# Patient Record
Sex: FEMALE | Race: WHITE | Employment: FULL TIME | URBAN - METROPOLITAN AREA
[De-identification: names, ages, dates, MRNs, and addresses within clinical notes are randomized per-mention and may not be internally consistent; named-entity substitution may affect disease eponyms.]

---

## 2015-10-08 LAB — CREATININE, EXTERNAL: 0.53

## 2017-03-02 ENCOUNTER — HOSPITAL ENCOUNTER (INPATIENT)
Age: 62
LOS: 5 days | Discharge: HOME | DRG: 189 | End: 2017-03-07
Attending: EMERGENCY MEDICINE | Admitting: INTERNAL MEDICINE
Payer: COMMERCIAL

## 2017-03-02 ENCOUNTER — APPOINTMENT (OUTPATIENT)
Dept: CT IMAGING | Age: 62
DRG: 189 | End: 2017-03-02
Attending: INTERNAL MEDICINE
Payer: COMMERCIAL

## 2017-03-02 ENCOUNTER — OFFICE VISIT (OUTPATIENT)
Dept: INTERNAL MEDICINE CLINIC | Age: 62
End: 2017-03-02

## 2017-03-02 VITALS
HEART RATE: 71 BPM | HEIGHT: 66 IN | DIASTOLIC BLOOD PRESSURE: 105 MMHG | BODY MASS INDEX: 44.03 KG/M2 | RESPIRATION RATE: 19 BRPM | TEMPERATURE: 98.3 F | SYSTOLIC BLOOD PRESSURE: 165 MMHG | WEIGHT: 274 LBS | OXYGEN SATURATION: 80 %

## 2017-03-02 DIAGNOSIS — J96.01 ACUTE RESPIRATORY FAILURE WITH HYPOXIA (HCC): Primary | ICD-10-CM

## 2017-03-02 DIAGNOSIS — R06.02 SHORTNESS OF BREATH: Primary | ICD-10-CM

## 2017-03-02 DIAGNOSIS — R09.02 HYPOXIA: ICD-10-CM

## 2017-03-02 PROBLEM — I10 HTN (HYPERTENSION), BENIGN: Status: ACTIVE | Noted: 2017-03-02

## 2017-03-02 PROBLEM — I31.39 PERICARDIAL EFFUSION: Status: ACTIVE | Noted: 2017-03-02

## 2017-03-02 PROBLEM — R77.8 ELEVATED TROPONIN: Status: ACTIVE | Noted: 2017-03-02

## 2017-03-02 LAB
ALBUMIN SERPL BCP-MCNC: 2.9 G/DL (ref 3.5–5)
ALBUMIN/GLOB SERPL: 0.7 {RATIO} (ref 1.1–2.2)
ALP SERPL-CCNC: 97 U/L (ref 45–117)
ALT SERPL-CCNC: 70 U/L (ref 12–78)
ANION GAP BLD CALC-SCNC: 3 MMOL/L (ref 5–15)
AST SERPL W P-5'-P-CCNC: 42 U/L (ref 15–37)
BASOPHILS # BLD AUTO: 0 K/UL (ref 0–0.1)
BASOPHILS # BLD: 0 % (ref 0–1)
BILIRUB SERPL-MCNC: 0.5 MG/DL (ref 0.2–1)
BNP SERPL-MCNC: 2667 PG/ML (ref 0–125)
BUN SERPL-MCNC: 17 MG/DL (ref 6–20)
BUN/CREAT SERPL: 27 (ref 12–20)
CALCIUM SERPL-MCNC: 8.4 MG/DL (ref 8.5–10.1)
CHLORIDE SERPL-SCNC: 99 MMOL/L (ref 97–108)
CO2 SERPL-SCNC: 38 MMOL/L (ref 21–32)
CREAT SERPL-MCNC: 0.64 MG/DL (ref 0.55–1.02)
EOSINOPHIL # BLD: 0.2 K/UL (ref 0–0.4)
EOSINOPHIL NFR BLD: 2 % (ref 0–7)
ERYTHROCYTE [DISTWIDTH] IN BLOOD BY AUTOMATED COUNT: 15.8 % (ref 11.5–14.5)
GLOBULIN SER CALC-MCNC: 4.1 G/DL (ref 2–4)
GLUCOSE SERPL-MCNC: 94 MG/DL (ref 65–100)
HCT VFR BLD AUTO: 48 % (ref 35–47)
HGB BLD-MCNC: 14.6 G/DL (ref 11.5–16)
LYMPHOCYTES # BLD AUTO: 29 % (ref 12–49)
LYMPHOCYTES # BLD: 2.7 K/UL (ref 0.8–3.5)
MCH RBC QN AUTO: 28.6 PG (ref 26–34)
MCHC RBC AUTO-ENTMCNC: 30.4 G/DL (ref 30–36.5)
MCV RBC AUTO: 93.9 FL (ref 80–99)
MONOCYTES # BLD: 0.8 K/UL (ref 0–1)
MONOCYTES NFR BLD AUTO: 9 % (ref 5–13)
NEUTS SEG # BLD: 5.6 K/UL (ref 1.8–8)
NEUTS SEG NFR BLD AUTO: 60 % (ref 32–75)
PLATELET # BLD AUTO: 260 K/UL (ref 150–400)
POTASSIUM SERPL-SCNC: 4.7 MMOL/L (ref 3.5–5.1)
PROT SERPL-MCNC: 7 G/DL (ref 6.4–8.2)
RBC # BLD AUTO: 5.11 M/UL (ref 3.8–5.2)
SODIUM SERPL-SCNC: 140 MMOL/L (ref 136–145)
TROPONIN I SERPL-MCNC: 0.09 NG/ML
WBC # BLD AUTO: 9.3 K/UL (ref 3.6–11)

## 2017-03-02 PROCEDURE — 85025 COMPLETE CBC W/AUTO DIFF WBC: CPT | Performed by: EMERGENCY MEDICINE

## 2017-03-02 PROCEDURE — 74011250636 HC RX REV CODE- 250/636: Performed by: FAMILY MEDICINE

## 2017-03-02 PROCEDURE — 74011636320 HC RX REV CODE- 636/320: Performed by: EMERGENCY MEDICINE

## 2017-03-02 PROCEDURE — 99285 EMERGENCY DEPT VISIT HI MDM: CPT

## 2017-03-02 PROCEDURE — 96365 THER/PROPH/DIAG IV INF INIT: CPT

## 2017-03-02 PROCEDURE — 84484 ASSAY OF TROPONIN QUANT: CPT | Performed by: EMERGENCY MEDICINE

## 2017-03-02 PROCEDURE — 77030013140 HC MSK NEB VYRM -A

## 2017-03-02 PROCEDURE — 74011250636 HC RX REV CODE- 250/636: Performed by: INTERNAL MEDICINE

## 2017-03-02 PROCEDURE — 93005 ELECTROCARDIOGRAM TRACING: CPT

## 2017-03-02 PROCEDURE — 83880 ASSAY OF NATRIURETIC PEPTIDE: CPT | Performed by: EMERGENCY MEDICINE

## 2017-03-02 PROCEDURE — 74011000250 HC RX REV CODE- 250: Performed by: FAMILY MEDICINE

## 2017-03-02 PROCEDURE — 94640 AIRWAY INHALATION TREATMENT: CPT

## 2017-03-02 PROCEDURE — 94761 N-INVAS EAR/PLS OXIMETRY MLT: CPT

## 2017-03-02 PROCEDURE — 74011250637 HC RX REV CODE- 250/637: Performed by: INTERNAL MEDICINE

## 2017-03-02 PROCEDURE — 71275 CT ANGIOGRAPHY CHEST: CPT

## 2017-03-02 PROCEDURE — 80053 COMPREHEN METABOLIC PANEL: CPT | Performed by: EMERGENCY MEDICINE

## 2017-03-02 PROCEDURE — 65660000000 HC RM CCU STEPDOWN

## 2017-03-02 RX ORDER — ENOXAPARIN SODIUM 100 MG/ML
40 INJECTION SUBCUTANEOUS EVERY 12 HOURS
Status: DISCONTINUED | OUTPATIENT
Start: 2017-03-02 | End: 2017-03-07 | Stop reason: HOSPADM

## 2017-03-02 RX ORDER — HYDRALAZINE HYDROCHLORIDE 20 MG/ML
10 INJECTION INTRAMUSCULAR; INTRAVENOUS
Status: DISCONTINUED | OUTPATIENT
Start: 2017-03-02 | End: 2017-03-07 | Stop reason: HOSPADM

## 2017-03-02 RX ORDER — ASCORBIC ACID 250 MG
250 TABLET ORAL DAILY
COMMUNITY
End: 2017-08-04

## 2017-03-02 RX ORDER — SODIUM CHLORIDE 0.9 % (FLUSH) 0.9 %
5-10 SYRINGE (ML) INJECTION EVERY 8 HOURS
Status: DISCONTINUED | OUTPATIENT
Start: 2017-03-02 | End: 2017-03-07 | Stop reason: HOSPADM

## 2017-03-02 RX ORDER — IPRATROPIUM BROMIDE AND ALBUTEROL SULFATE 2.5; .5 MG/3ML; MG/3ML
3 SOLUTION RESPIRATORY (INHALATION)
Status: COMPLETED | OUTPATIENT
Start: 2017-03-02 | End: 2017-03-02

## 2017-03-02 RX ORDER — TRIAMTERENE/HYDROCHLOROTHIAZID 37.5-25 MG
1 TABLET ORAL DAILY
Status: DISCONTINUED | OUTPATIENT
Start: 2017-03-03 | End: 2017-03-07 | Stop reason: HOSPADM

## 2017-03-02 RX ORDER — ERGOCALCIFEROL 1.25 MG/1
50000 CAPSULE ORAL
COMMUNITY

## 2017-03-02 RX ORDER — FUROSEMIDE 10 MG/ML
20 INJECTION INTRAMUSCULAR; INTRAVENOUS
Status: COMPLETED | OUTPATIENT
Start: 2017-03-02 | End: 2017-03-02

## 2017-03-02 RX ORDER — PROCHLORPERAZINE EDISYLATE 5 MG/ML
5 INJECTION INTRAMUSCULAR; INTRAVENOUS
Status: DISCONTINUED | OUTPATIENT
Start: 2017-03-02 | End: 2017-03-07 | Stop reason: HOSPADM

## 2017-03-02 RX ORDER — MAGNESIUM SULFATE HEPTAHYDRATE 40 MG/ML
2 INJECTION, SOLUTION INTRAVENOUS ONCE
Status: COMPLETED | OUTPATIENT
Start: 2017-03-02 | End: 2017-03-02

## 2017-03-02 RX ORDER — ERGOCALCIFEROL 1.25 MG/1
50000 CAPSULE ORAL
Status: DISCONTINUED | OUTPATIENT
Start: 2017-03-02 | End: 2017-03-07 | Stop reason: HOSPADM

## 2017-03-02 RX ORDER — FLUTICASONE PROPIONATE AND SALMETEROL 100; 50 UG/1; UG/1
1 POWDER RESPIRATORY (INHALATION) EVERY 12 HOURS
COMMUNITY
End: 2017-07-14

## 2017-03-02 RX ORDER — LORATADINE 10 MG/1
10 TABLET ORAL DAILY
COMMUNITY
End: 2017-07-14

## 2017-03-02 RX ORDER — LORATADINE 10 MG/1
10 TABLET ORAL DAILY
Status: DISCONTINUED | OUTPATIENT
Start: 2017-03-03 | End: 2017-03-07 | Stop reason: HOSPADM

## 2017-03-02 RX ORDER — ACETAMINOPHEN 325 MG/1
650 TABLET ORAL
Status: DISCONTINUED | OUTPATIENT
Start: 2017-03-02 | End: 2017-03-07 | Stop reason: HOSPADM

## 2017-03-02 RX ORDER — LEVOFLOXACIN 5 MG/ML
750 INJECTION, SOLUTION INTRAVENOUS EVERY 24 HOURS
Status: DISCONTINUED | OUTPATIENT
Start: 2017-03-02 | End: 2017-03-03

## 2017-03-02 RX ORDER — SODIUM CHLORIDE 0.9 % (FLUSH) 0.9 %
5-10 SYRINGE (ML) INJECTION AS NEEDED
Status: DISCONTINUED | OUTPATIENT
Start: 2017-03-02 | End: 2017-03-07 | Stop reason: HOSPADM

## 2017-03-02 RX ORDER — DIPHENHYDRAMINE HYDROCHLORIDE 50 MG/ML
12.5 INJECTION, SOLUTION INTRAMUSCULAR; INTRAVENOUS
Status: DISCONTINUED | OUTPATIENT
Start: 2017-03-02 | End: 2017-03-07 | Stop reason: HOSPADM

## 2017-03-02 RX ORDER — FLUTICASONE PROPIONATE AND SALMETEROL 100; 50 UG/1; UG/1
1 POWDER RESPIRATORY (INHALATION) EVERY 12 HOURS
Status: CANCELLED | OUTPATIENT
Start: 2017-03-02

## 2017-03-02 RX ORDER — IPRATROPIUM BROMIDE AND ALBUTEROL SULFATE 2.5; .5 MG/3ML; MG/3ML
3 SOLUTION RESPIRATORY (INHALATION)
Status: DISCONTINUED | OUTPATIENT
Start: 2017-03-03 | End: 2017-03-07 | Stop reason: HOSPADM

## 2017-03-02 RX ORDER — NALOXONE HYDROCHLORIDE 0.4 MG/ML
0.4 INJECTION, SOLUTION INTRAMUSCULAR; INTRAVENOUS; SUBCUTANEOUS AS NEEDED
Status: DISCONTINUED | OUTPATIENT
Start: 2017-03-02 | End: 2017-03-07 | Stop reason: HOSPADM

## 2017-03-02 RX ADMIN — HYDRALAZINE HYDROCHLORIDE 10 MG: 20 INJECTION INTRAMUSCULAR; INTRAVENOUS at 20:57

## 2017-03-02 RX ADMIN — IOPAMIDOL 85 ML: 755 INJECTION, SOLUTION INTRAVENOUS at 19:57

## 2017-03-02 RX ADMIN — ENOXAPARIN SODIUM 40 MG: 40 INJECTION SUBCUTANEOUS at 20:58

## 2017-03-02 RX ADMIN — IPRATROPIUM BROMIDE AND ALBUTEROL SULFATE 3 ML: .5; 2.5 SOLUTION RESPIRATORY (INHALATION) at 18:59

## 2017-03-02 RX ADMIN — ERGOCALCIFEROL 50000 UNITS: 1.25 CAPSULE ORAL at 22:50

## 2017-03-02 RX ADMIN — FUROSEMIDE 20 MG: 10 INJECTION, SOLUTION INTRAMUSCULAR; INTRAVENOUS at 20:14

## 2017-03-02 RX ADMIN — IPRATROPIUM BROMIDE AND ALBUTEROL SULFATE 3 ML: .5; 2.5 SOLUTION RESPIRATORY (INHALATION) at 18:07

## 2017-03-02 RX ADMIN — MAGNESIUM SULFATE HEPTAHYDRATE 2 G: 40 INJECTION, SOLUTION INTRAVENOUS at 18:08

## 2017-03-02 RX ADMIN — LEVOFLOXACIN 750 MG: 5 INJECTION, SOLUTION INTRAVENOUS at 21:00

## 2017-03-02 RX ADMIN — Medication 10 ML: at 21:03

## 2017-03-02 NOTE — ED TRIAGE NOTES
Patient states she was diagnosed with bronchitis before christmas. Patient had a chest xray done at [facility] January 24 2017. patient went to see pcp today and was told to go to ED. Patient states she was sating in the 76s at the doctors office.

## 2017-03-02 NOTE — MR AVS SNAPSHOT
Visit Information Date & Time Provider Department Dept. Phone Encounter #  
 3/2/2017  2:30 PM Jessi Marcus, [address] [place] [address],[address] [phone] [id number] Follow-up Instructions Return for advised ER follow up. Merissa Serna Upcoming Health Maintenance Date Due Hepatitis C Screening 1955 DTaP/Tdap/Td series (1 - Tdap) 11/10/1976 PAP AKA CERVICAL CYTOLOGY 11/10/1976 BREAST CANCER SCRN MAMMOGRAM 11/10/2005 FOBT Q 1 YEAR AGE 50-75 11/10/2005 ZOSTER VACCINE AGE 60> 11/10/2015 INFLUENZA AGE 9 TO ADULT 8/1/2016 Allergies as of 3/2/2017  Review Complete On: 3/2/2017 By: Jessi Marcus MD  
  
 Severity Noted Reaction Type Reactions Bee Sting [Sting, Bee] High 03/23/2016    Anaphylaxis Codeine High 03/23/2016    Hives, Shortness of Breath  
 bp drop Methotrexate  03/23/2016    Nausea and Vomiting Penicillins  03/23/2016    Hives, Shortness of Breath  
 bp drop Current Immunizations  Never Reviewed No immunizations on file. Not reviewed this visit You Were Diagnosed With   
  
 Codes Comments Shortness of breath    -  Primary ICD-10-CM: R06.02 
ICD-9-CM: 786.05 Hypoxia     ICD-10-CM: R09.02 
ICD-9-CM: 799.02 Vitals BP  
  
  
  
  
  
 (!) 165/105 (BP 1 Location: Left arm, BP Patient Position: Sitting) Vitals History BMI and BSA Data Body Mass Index Body Surface Area  
 44.22 kg/m 2 2.41 m 2 Preferred Pharmacy Pharmacy Name Phone [phone] [phone] Your Updated Medication List  
  
   
This list is accurate as of: 3/2/17  3:35 PM.  Always use your most recent med list.  
  
  
  
  
 ADVAIR DISKUS 100-50 mcg/dose diskus inhaler Generic drug:  fluticasone-salmeterol Take 1 Puff by inhalation every twelve (12) hours. cholecalciferol 1,000 unit tablet Commonly known as:  VITAMIN D3 Take  by mouth daily. CLARITIN 10 mg tablet Generic drug:  loratadine Take 10 mg by mouth. EPINEPHrine 0.3 mg/0.3 mL injection Commonly known as:  EPIPEN  
0.3 mL by IntraMUSCular route once as needed. 2 pens  
  
 ergocalciferol 50,000 unit capsule Commonly known as:  ERGOCALCIFEROL TK 1 C PO ONCE A WEEK. THIS REPLACES DAILY VITAMIN D  
  
 lidocaine HCl 3 % topical cream  
Commonly known as:  XYLOCAINE Apply  to affected area two (2) times a day. ORENCIA 125 mg/mL Syrg Generic drug:  abatacept Inject once weekly  
  
 triamterene-hydroCHLOROthiazide 37.5-25 mg per tablet Commonly known as:  Sirisha Ruse Take 1 Tab by mouth daily. vitamin c-vitamin e Cap Take  by mouth. Follow-up Instructions Return for advised ER follow up. .  
  
  
Patient Instructions ADVISE ER EVALUATION OF SYMPTOMS OF SHORTNESS OF BREATH AND LOW OXYGEN. Introducing [facility] SERVICES! Dear Alice Persaud: Thank you for requesting a [employer] account. Our records indicate that you already have an active [employer] account. You can access your account anytime at [email or web address]. [email or web address]/[email or web address] Did you know that you can access your hospital and ER discharge instructions at any time in [employer]? You can also review all of your test results from your hospital stay or ER visit. Additional Information If you have questions, please visit the Frequently Asked Questions section of the [employer] website at [email or web address] Remember, [employer] is NOT to be used for urgent needs. For medical emergencies, dial 911. Now available from your iPhone and Android! Please provide this summary of care documentation to your next provider. Your primary care clinician is listed as Luis Choi. If you have any questions after today's visit, please call [phone].

## 2017-03-02 NOTE — ED PROVIDER NOTES
HPI     65 yo F w/ hx of RA and HTN who presents from PCP office for decreased oxygen saturation. Patient reports that for the past 2-2.5 weeks she has SOB and wheezing. Positive dry hacking cough. No sputum. Was given Advair which helped some. SOB is worse when laying down. No PND. Saw her PCP today and oxygen saturation was 70s so patient was advised to go to the ER. Does report having bronchitis since [place]. Had CXR done at [facility] which shows no significant airspace consolidation. Non smoker. Past Medical History:   Diagnosis Date    Hypertension     RA (rheumatoid arthritis) ([facility] [address]      Past Surgical History:   Procedure Laterality Date    HX GYN      R vulvectomy       Family History:   Problem Relation Age of Onset    Heart Disease Father     Hypertension Father     Stroke Father     Heart Disease Mother     Hypertension Mother     Hypertension Sister     Hypertension Brother      Social History     Social History    Marital status:      Spouse name: N/A    Number of children: N/A    Years of education: N/A     Occupational History    Not on file. Social History Main Topics    Smoking status: Former Smoker    Smokeless tobacco: Not on file    Alcohol use Yes    Drug use: No    Sexual activity: Yes     Partners: Male     Other Topics Concern    Not on file     Social History Narrative     ALLERGIES: Bee sting [sting, bee]; Codeine; Methotrexate; and Penicillins    Review of Systems   Constitutional: Negative for chills and fever. HENT: Negative for congestion, ear pain, rhinorrhea and sore throat. Eyes: Negative for visual disturbance. Respiratory: Positive for cough, shortness of breath and wheezing. Cardiovascular: Positive for leg swelling (bilateral lower extermity). Negative for chest pain and palpitations. Gastrointestinal: Negative for abdominal pain, blood in stool, constipation, diarrhea, nausea and vomiting.    Genitourinary: Negative for difficulty urinating and dysuria. Musculoskeletal: Negative for arthralgias and myalgias. Skin: Negative for rash. Neurological: Negative for weakness and numbness. Hematological: Does not bruise/bleed easily. positive daytime sleepiness,fals asleep at the table    Vitals:    03/02/17 1738 03/02/17 1743 03/02/17 1745 03/02/17 1800   BP: (!) 206/156  (!) 186/98 (!) 197/100   Pulse: 77  76 80   Resp: 21  28 20   Temp: 98.3 °F (36.8 °C)      SpO2: 97% 98% 96% 96%   Weight: 124.3 kg (274 lb)      Height: 5' 6\" (1.676 m)            Physical Exam   Constitutional: She appears distressed (mild). Neck: Neck supple. No JVD present. Cardiovascular: Normal rate, regular rhythm, normal heart sounds and intact distal pulses. Exam reveals no gallop and no friction rub. No murmur heard. Pulmonary/Chest: She has no rales. Very poor breath sounds, few wheezes in the RUL   Abdominal: Soft. Bowel sounds are normal. She exhibits no distension. There is no tenderness. There is no rebound and no guarding. Musculoskeletal: She exhibits edema (bilateral +1 pitting). Neurological: She is alert. No focal deficits   Vitals reviewed.      MDM  Number of Diagnoses or Management Options  Diagnosis management comments: Hypoxia  Unknown etiology  - duoneb and Mag 2 g  - CMP, CBC, troponin, pro BNP    ED Course     EKG: Personally reviewed and demonstrates: NSR     Lab Results   Component Value Date/Time    WBC 9.3 03/02/2017 05:47 PM    HGB 14.6 03/02/2017 05:47 PM    HCT 48.0 03/02/2017 05:47 PM    PLATELET 943 82/98/6845 05:47 PM    MCV 93.9 03/02/2017 05:47 PM      Lab Results   Component Value Date/Time    Sodium 140 03/02/2017 05:47 PM    Potassium 4.7 03/02/2017 05:47 PM    Chloride 99 03/02/2017 05:47 PM    CO2 38 03/02/2017 05:47 PM    Anion gap 3 03/02/2017 05:47 PM    Glucose 94 03/02/2017 05:47 PM    BUN 17 03/02/2017 05:47 PM    Creatinine 0.64 03/02/2017 05:47 PM    BUN/Creatinine ratio 27 03/02/2017 05:47 PM    GFR est AA >60 03/02/2017 05:47 PM    GFR est non-AA >60 03/02/2017 05:47 PM    Calcium 8.4 03/02/2017 05:47 PM    Bilirubin, total 0.5 03/02/2017 05:47 PM    AST (SGOT) 42 03/02/2017 05:47 PM    Alk. phosphatase 97 03/02/2017 05:47 PM    Protein, total 7.0 03/02/2017 05:47 PM    Albumin 2.9 03/02/2017 05:47 PM    Globulin 4.1 03/02/2017 05:47 PM    A-G Ratio 0.7 03/02/2017 05:47 PM    ALT (SGPT) 70 03/02/2017 05:47 PM      Lab Results   Component Value Date/Time    Troponin-I, Qt. 0.09 03/02/2017 05:47 PM      Pro BNP: 2667    Procedures     Patient evaluated after first duoneb, slight improvement in air movement. Scattered wheezing and rales. Repeat duoneb    Patient evaluated after second duoneb. Some improvement in air movement. Diffuse wheezing and rales. bibasliar wheezing. Continues to be hypoxic 79%. Will try non rebreater    Will give Lasix 20 mg IV    DDx:   Acute hypoxic respiratory failure  New onset CHF  Acute bronchitis    Spoke to Dr Kiko Easley, hospitalist who will admit patient.      Patient discussed with Dr Leni Davenport, ED attending     Marivel Negrete MD  Family Medicine Resident (PGY-3)  3/2/2017

## 2017-03-02 NOTE — PROGRESS NOTES
Zulema Man is a 64 y.o. female who complains of shortness of breath for 2 weeks. She reports a nonproductive cough, wheezing, and SOB at rest and with exertion for the past 2 weeks that has been progressively worsening. She has not had any URI sx. No fever. Legs swelling for the past 1 week. Denies orthopnea or PND. No calf pain. No history of CAD or CHF. Nonsmoker. No asthma hx. She does have a history of RA, but no prior pulmonary sx from RA. Treated for RA by Dr. Donnie Mccarthy, on [facility]. She started Advair 100/50 twice a day 2 weeks ago with no change in her symptoms. She was seen at Dr. Shani Cash office and reports her SpO2 was 88% at that time. She was sent by Dr. Jayro Odom for a CXR on 2/24, no pneumonia or CXR seen. Past Medical History:   Diagnosis Date    Hypertension     RA (rheumatoid arthritis) ([facility] [address]        Family History   Problem Relation Age of Onset    Heart Disease Father     Hypertension Father     Stroke Father     Heart Disease Mother     Hypertension Mother     Hypertension Sister     Hypertension Brother        Social History     Social History    Marital status:      Spouse name: N/A    Number of children: N/A    Years of education: N/A     Occupational History    Not on file. Social History Main Topics    Smoking status: Former Smoker    Smokeless tobacco: Not on file    Alcohol use Yes    Drug use: No    Sexual activity: Yes     Partners: Male     Other Topics Concern    Not on file     Social History Narrative       No current facility-administered medications on file prior to visit. Current Outpatient Prescriptions on File Prior to Visit   Medication Sig Dispense Refill    triamterene-hydroCHLOROthiazide (MAXZIDE) 37.5-25 mg per tablet Take 1 Tab by mouth daily. 90 Tab 3    EPINEPHrine (EPIPEN) 0.3 mg/0.3 mL (1:1,000) injection 0.3 mL by IntraMUSCular route once as needed.  2 pens 0.6 mL 0    ORENCIA 125 mg/mL syrg 125 mg by SubCUTAneous route every seven (7) days. On Saturday      cholecalciferol (VITAMIN D3) 1,000 unit tablet Take 1,000 Units by mouth daily. Except on Friday         Review of Systems  Pertinent items are noted in HPI. Objective:     Visit Vitals    BP (!) 165/105 (BP 1 Location: Left arm, BP Patient Position: Sitting)    Pulse 71    Temp 98.3 °F (36.8 °C) (Oral)    Resp 19    Ht 5' 6\" (1.676 m)    Wt 274 lb (124.3 kg)    LMP 02/23/2013    SpO2 (!) 80%    BMI 44.22 kg/m2     Gen:  SOB at rest, SpO2 77%, on BNC O2 at 2l/min increased to 90% and less SOB  HEENT:   PERRL,normal conjunctiva. OP no erythema, no exudates, MMM  Neck:  No masses, no bruits  Resp:  Diminished breath sounds, no wheezing, no rhonchi, no rales  CV:  RRR, normal S1S2   Extrem:  +2 pulses, +1 edema to knee bilaterally, warm distally      Assessment/Plan:       ICD-10-CM ICD-9-CM    1. Shortness of breath R06.02 786.05    2. Hypoxia R09.02 799.02    I am concerned about possible PE versus CHF. I recommend transport to ED [facility] by EMS. Patient refused and signed AMA form. She states that she wants to go home and then will go to ED on her own. I have advised her that due to her level of hypoxia and significant SOB, she is at high risk of deterioration. She is adamant that she will not go via EMS to ED now. Signed AMA form. Follow-up Disposition:  Return for advised ER follow up.  Torres Ulloa MD

## 2017-03-02 NOTE — IP AVS SNAPSHOT
Elise Hilario 
 
 
 [facility] [address] [facility] 
[phone] Patient: Rei Weinberg MRN: [id number] KET:[birth year] You are allergic to the following Allergen Reactions Bee Sting (Sting, Bee) Anaphylaxis Codeine Hives Shortness of Breath  
 bp drop Methotrexate Nausea and Vomiting Penicillins Hives Shortness of Breath  
 bp drop Recent Documentation Height Weight BMI OB Status Smoking Status 1.676 m 119.9 kg 42.66 kg/m2 Postmenopausal Former Smoker Emergency Contacts Name Discharge Info Relation Home Work [facility] DISCHARGE CAREGIVER [3] Sister [23]   [phone] Raciel Lehman DISCHARGE CAREGIVER [3] Other Relative [6] [phone] About your hospitalization You were admitted on:  March 2, 2017 You last received care in the:  [facility] 3 PRO CARE TELE 2 You were discharged on:  March 7, 2017 Unit phone number:  [phone] Why you were hospitalized Your primary diagnosis was:  Not on File Your diagnoses also included:  Sob (Shortness Of Breath), Htn (Hypertension), Benign, Rheumatoid Arthritis Involving Multiple Sites With Positive Rheumatoid Factor (Hcc), Acute Respiratory Failure With Hypoxia (Hcc), Elevated Troponin, Pericardial Effusion, Acute Encephalopathy, Multifocal Myoclonus Providers Seen During Your Hospitalizations Provider Role Specialty Primary office phone Tania Rainey MD Attending Provider Emergency Medicine [phone] Angie Flores MD Attending Provider Internal Medicine [phone] Guera Arriaga MD Attending Provider Hospitalist [phone] Anthony Saucedo MD Attending Provider Internal Medicine [phone] Your Primary Care Physician (PCP) Primary Care Physician Office Phone Office Fax Marilee Rater [phone] Follow-up Information Follow up With Details Comments Contact Info Carmen Guzman MD On 3/9/2017 11:20 am  [address] [facility] [address] [facility] 
[phone] Hailee Medina MD   [address] [place] [address],[address] [facility] 
[phone] Hanna Gloria MD In 1 week  [address] [facility] [address] [facility] 
[phone] Your Appointments Thursday March 09, 2017 11:20 AM EST HOSPITAL DISCHARGE with Carmen Guzman MD  
[facility] ([facility]) [address] [facility] [address] [facility]  
[phone] Current Discharge Medication List  
  
START taking these medications Dose & Instructions Dispensing Information Comments Morning Noon Evening Bedtime  
 albuterol-ipratropium 2.5 mg-0.5 mg/3 ml Nebu Commonly known as:  Ulises Embs Your next dose is: Today, Tomorrow Other:  _________ Dose:  3 mL  
3 mL by Nebulization route every six (6) hours as needed. Quantity:  30 Nebule Refills:  0  
     
   
   
   
  
 atorvastatin 10 mg tablet Commonly known as:  LIPITOR Your next dose is: Today, Tomorrow Other:  _________ Dose:  10 mg Take 1 Tab by mouth daily. Quantity:  30 Tab Refills:  0  
     
   
   
   
  
 levoFLOXacin 750 mg tablet Commonly known as:  Charlcie Micah Your next dose is: Today, Tomorrow Other:  _________ Dose:  750 mg Take 1 Tab by mouth every twenty-four (24) hours for 2 days. Quantity:  2 Tab Refills:  0  
     
   
   
   
  
 metoprolol succinate 25 mg XL tablet Commonly known as:  TOPROL-XL Your next dose is: Today, Tomorrow Other:  _________ Dose:  25 mg Take 1 Tab by mouth daily. Quantity:  30 Tab Refills:  0 CONTINUE these medications which have NOT CHANGED Dose & Instructions Dispensing Information Comments Morning Noon Evening Bedtime ADVAIR DISKUS 100-50 mcg/dose diskus inhaler Generic drug:  fluticasone-salmeterol Your next dose is: Today, Tomorrow Other:  _________ Dose:  1 Puff Take 1 Puff by inhalation every twelve (12) hours. Refills:  0  
     
   
   
   
  
 cholecalciferol 1,000 unit tablet Commonly known as:  VITAMIN D3 Your next dose is: Today, Tomorrow Other:  _________ Dose:  1000 Units Take 1,000 Units by mouth daily. Except on Friday Refills:  0 CLARITIN 10 mg tablet Generic drug:  loratadine Your next dose is: Today, Tomorrow Other:  _________ Dose:  10 mg Take 10 mg by mouth daily. Refills:  0 EPINEPHrine 0.3 mg/0.3 mL injection Commonly known as:  Ivania Matt Your next dose is: Today, Tomorrow Other:  _________ Dose:  0.3 mg  
0.3 mL by IntraMUSCular route once as needed. 2 pens Quantity:  0.6 mL Refills:  0 Ocuvite PreserVision [phone] unit-mg-unit Tab tablet Generic drug:  vitamins  A,C,E-zinc-copper Your next dose is: Today, Tomorrow Other:  _________ Dose:  1 Tab Take 1 Tab by mouth two (2) times a day. Refills:  0  
     
   
   
   
  
 ORENCIA 125 mg/mL Syrg Generic drug:  abatacept Your next dose is: Today, Tomorrow Other:  _________ Dose:  125 mg  
125 mg by SubCUTAneous route every seven (7) days. On Saturday Refills:  0  
     
   
   
   
  
 triamterene-hydroCHLOROthiazide 37.5-25 mg per tablet Commonly known as:  Adriánciro Rogers Your next dose is: Today, Tomorrow Other:  _________ Dose:  1 Tab Take 1 Tab by mouth daily. Quantity:  90 Tab Refills:  3 VITAMIN C 250 mg tablet Generic drug:  ascorbic acid (vitamin C) Your next dose is: Today, Tomorrow Other:  _________ Dose:  250 mg Take 250 mg by mouth daily. Refills:  0  
     
   
   
   
  
 VITAMIN D2 50,000 unit capsule Generic drug:  ergocalciferol Your next dose is: Today, Tomorrow Other:  _________ Dose:  02340 Units Take 50,000 Units by mouth every seven (7) days. On Friday   Indications: VITAMIN D DEFICIENCY Refills:  0 Where to Get Your Medications Information on where to get these meds will be given to you by the nurse or doctor. ! Ask your nurse or doctor about these medications  
  albuterol-ipratropium 2.5 mg-0.5 mg/3 ml Nebu  
 atorvastatin 10 mg tablet  
 levoFLOXacin 750 mg tablet  
 metoprolol succinate 25 mg XL tablet Discharge Instructions HOSPITALIST DISCHARGE INSTRUCTIONS 
NAME: Daniela Jett :  1955 MRN:  [id number] Date/Time:  3/7/2017 9:22 AM 
 
ADMIT DATE: 3/2/2017 DISCHARGE DATE: 3/7/2017 ADMITTING DIAGNOSIS: 
Shortness of breath Hypoxia (low oxygen levels) DISCHARGE DIAGNOSIS: 
As above MEDICATIONS: 
 
· It is important that you take the medication exactly as they are prescribed. · Keep your medication in the bottles provided by the pharmacist and keep a list of the medication names, dosages, and times to be taken in your wallet. · Do not take other medications without consulting your doctor. Pain Management: per above medications What to do at Home: 1. Please be sure to finish the course of antibiotics for your lung infection 2. Be sure to follow up with pulmonary in clinic for further testing regarding your lung disease Recommended diet:  Resume previous diet Recommended activity: Activity as tolerated If you experience any of the following symptoms then please call your primary care physician or return to the emergency room if you cannot get hold of your doctor: 
Fever, chills, nausea, vomiting, diarrhea, change in mentation, falling, bleeding, shortness of breath Follow Up: 
Dr. Lizeth Pérez MD  you are to call and set up an appointment to see them in 1 week. Information obtained by : 
I understand that if any problems occur once I am at home I am to contact my physician. I understand and acknowledge receipt of the instructions indicated above. Physician's or R.N.'s Signature                                                                  Date/Time Patient or Representative Signature                                                          Date/Time Discharge Orders None [id number] [employer] Announcement We are excited to announce that we are making your provider's discharge notes available to you in [employer]. You will see these notes when they are completed and signed by the physician that discharged you from your recent hospital stay. If you have any questions or concerns about any information you see in [employer], please call the Health Information Department where you were seen or reach out to your Primary Care Provider for more information about your plan of care. Introducing [facility] & HEALTH SERVICES! Dear Zac Mccloud: Thank you for requesting a [employer] account. Our records indicate that you already have an active [employer] account. You can access your account anytime at [email or web address] Did you know that you can access your hospital and ER discharge instructions at any time in [employer]? You can also review all of your test results from your hospital stay or ER visit. Additional Information If you have questions, please visit the Frequently Asked Questions section of the [facility] website at [email or web address] Remember, MyChart is NOT to be used for urgent needs. For medical emergencies, dial 911. Now available from your iPhone and Android! General Information Please provide this summary of care documentation to your next provider. Patient Signature:  ____________________________________________________________ Date:  ____________________________________________________________  
  
Dawood Elliott Provider Signature:  ____________________________________________________________ Date:  ____________________________________________________________

## 2017-03-02 NOTE — IP AVS SNAPSHOT
Current Discharge Medication List  
  
Take these medications at their scheduled times Dose & Instructions Dispensing Information Comments Morning Noon Evening Bedtime ADVAIR DISKUS 100-50 mcg/dose diskus inhaler Generic drug:  fluticasone-salmeterol Your next dose is: Today, Tomorrow Other:  ____________ Dose:  1 Puff Take 1 Puff by inhalation every twelve (12) hours. Refills:  0  
     
   
   
   
  
 atorvastatin 10 mg tablet Commonly known as:  LIPITOR Your next dose is: Today, Tomorrow Other:  ____________ Dose:  10 mg Take 1 Tab by mouth daily. Quantity:  30 Tab Refills:  0  
     
   
   
   
  
 cholecalciferol 1,000 unit tablet Commonly known as:  VITAMIN D3 Your next dose is: Today, Tomorrow Other:  ____________ Dose:  1000 Units Take 1,000 Units by mouth daily. Except on Friday Refills:  0 CLARITIN 10 mg tablet Generic drug:  loratadine Your next dose is: Today, Tomorrow Other:  ____________ Dose:  10 mg Take 10 mg by mouth daily. Refills:  0  
     
   
   
   
  
 levoFLOXacin 750 mg tablet Commonly known as:  Tahira Armor Your next dose is: Today, Tomorrow Other:  ____________ Dose:  750 mg Take 1 Tab by mouth every twenty-four (24) hours for 2 days. Quantity:  2 Tab Refills:  0  
     
   
   
   
  
 metoprolol succinate 25 mg XL tablet Commonly known as:  TOPROL-XL Your next dose is: Today, Tomorrow Other:  ____________ Dose:  25 mg Take 1 Tab by mouth daily. Quantity:  30 Tab Refills:  0 Ocuvite PreserVision [phone] unit-mg-unit Tab tablet Generic drug:  vitamins  A,C,E-zinc-copper Your next dose is: Today, Tomorrow Other:  ____________ Dose:  1 Tab Take 1 Tab by mouth two (2) times a day. Refills:  0 ORENCIA 125 mg/mL Syrg Generic drug:  abatacept Your next dose is: Today, Tomorrow Other:  ____________ Dose:  125 mg  
125 mg by SubCUTAneous route every seven (7) days. On Saturday Refills:  0  
     
   
   
   
  
 triamterene-hydroCHLOROthiazide 37.5-25 mg per tablet Commonly known as:  Freddy Mathieu Your next dose is: Today, Tomorrow Other:  ____________ Dose:  1 Tab Take 1 Tab by mouth daily. Quantity:  90 Tab Refills:  3 VITAMIN C 250 mg tablet Generic drug:  ascorbic acid (vitamin C) Your next dose is: Today, Tomorrow Other:  ____________ Dose:  250 mg Take 250 mg by mouth daily. Refills:  0  
     
   
   
   
  
 VITAMIN D2 50,000 unit capsule Generic drug:  ergocalciferol Your next dose is: Today, Tomorrow Other:  ____________ Dose:  44090 Units Take 50,000 Units by mouth every seven (7) days. On Friday   Indications: VITAMIN D DEFICIENCY Refills:  0 Take these medications as needed Dose & Instructions Dispensing Information Comments Morning Noon Evening Bedtime  
 albuterol-ipratropium 2.5 mg-0.5 mg/3 ml Nebu Commonly known as:  Vernon Shirlene Your next dose is: Today, Tomorrow Other:  ____________ Dose:  3 mL  
3 mL by Nebulization route every six (6) hours as needed. Quantity:  30 Nebule Refills:  0 EPINEPHrine 0.3 mg/0.3 mL injection Commonly known as:  Mone Coke Your next dose is: Today, Tomorrow Other:  ____________ Dose:  0.3 mg  
0.3 mL by IntraMUSCular route once as needed. 2 pens Quantity:  0.6 mL Refills:  0 Where to Get Your Medications Information about where to get these medications is not yet available ! Ask your nurse or doctor about these medications albuterol-ipratropium 2.5 mg-0.5 mg/3 ml Nebu  
 atorvastatin 10 mg tablet  
 levoFLOXacin 750 mg tablet  
 metoprolol succinate 25 mg XL tablet

## 2017-03-02 NOTE — PROGRESS NOTES
Reviewed record in preparation for visit and have obtained necessary documentation. Identified pt with two pt identifiers(name and ).     Chief Complaint   Patient presents with    Cough     c/o of dry cough since december     Shortness of Breath     c/o of SOB x 2 weeks            Coordination of Care Questionnaire:  :     1) Have you been to an emergency room, urgent care clinic since your last visit? no   Hospitalized since your last visit? no             2) Have you seen or consulted any other health care providers outside of [address] [place] [address] since your last visit? no  (Include any pap smears or colon screenings in this section.)

## 2017-03-03 ENCOUNTER — APPOINTMENT (OUTPATIENT)
Dept: CT IMAGING | Age: 62
DRG: 189 | End: 2017-03-03
Payer: COMMERCIAL

## 2017-03-03 LAB
ALBUMIN SERPL BCP-MCNC: 2.8 G/DL (ref 3.5–5)
ALBUMIN/GLOB SERPL: 0.7 {RATIO} (ref 1.1–2.2)
ALP SERPL-CCNC: 98 U/L (ref 45–117)
ALT SERPL-CCNC: 63 U/L (ref 12–78)
ANION GAP BLD CALC-SCNC: 3 MMOL/L (ref 5–15)
AST SERPL W P-5'-P-CCNC: 30 U/L (ref 15–37)
ATRIAL RATE: 77 BPM
BASOPHILS # BLD AUTO: 0 K/UL (ref 0–0.1)
BASOPHILS # BLD: 0 % (ref 0–1)
BILIRUB SERPL-MCNC: 0.3 MG/DL (ref 0.2–1)
BUN SERPL-MCNC: 16 MG/DL (ref 6–20)
BUN/CREAT SERPL: 22 (ref 12–20)
CALCIUM SERPL-MCNC: 8.3 MG/DL (ref 8.5–10.1)
CALCULATED P AXIS, ECG09: 53 DEGREES
CALCULATED R AXIS, ECG10: -3 DEGREES
CALCULATED T AXIS, ECG11: 17 DEGREES
CHLORIDE SERPL-SCNC: 100 MMOL/L (ref 97–108)
CO2 SERPL-SCNC: 38 MMOL/L (ref 21–32)
CREAT SERPL-MCNC: 0.72 MG/DL (ref 0.55–1.02)
DIAGNOSIS, 93000: NORMAL
EOSINOPHIL # BLD: 0.1 K/UL (ref 0–0.4)
EOSINOPHIL NFR BLD: 2 % (ref 0–7)
ERYTHROCYTE [DISTWIDTH] IN BLOOD BY AUTOMATED COUNT: 15.6 % (ref 11.5–14.5)
GLOBULIN SER CALC-MCNC: 4.1 G/DL (ref 2–4)
GLUCOSE SERPL-MCNC: 100 MG/DL (ref 65–100)
HCT VFR BLD AUTO: 51.2 % (ref 35–47)
HGB BLD-MCNC: 15.4 G/DL (ref 11.5–16)
LYMPHOCYTES # BLD AUTO: 22 % (ref 12–49)
LYMPHOCYTES # BLD: 1.8 K/UL (ref 0.8–3.5)
MCH RBC QN AUTO: 28.6 PG (ref 26–34)
MCHC RBC AUTO-ENTMCNC: 30.1 G/DL (ref 30–36.5)
MCV RBC AUTO: 95 FL (ref 80–99)
MONOCYTES # BLD: 0.7 K/UL (ref 0–1)
MONOCYTES NFR BLD AUTO: 9 % (ref 5–13)
NEUTS SEG # BLD: 5.5 K/UL (ref 1.8–8)
NEUTS SEG NFR BLD AUTO: 67 % (ref 32–75)
P-R INTERVAL, ECG05: 168 MS
PLATELET # BLD AUTO: 244 K/UL (ref 150–400)
POTASSIUM SERPL-SCNC: 4.1 MMOL/L (ref 3.5–5.1)
PROT SERPL-MCNC: 6.9 G/DL (ref 6.4–8.2)
Q-T INTERVAL, ECG07: 386 MS
QRS DURATION, ECG06: 86 MS
QTC CALCULATION (BEZET), ECG08: 436 MS
RBC # BLD AUTO: 5.39 M/UL (ref 3.8–5.2)
SODIUM SERPL-SCNC: 141 MMOL/L (ref 136–145)
TROPONIN I SERPL-MCNC: 0.05 NG/ML
TROPONIN I SERPL-MCNC: 0.07 NG/ML
TSH SERPL DL<=0.05 MIU/L-ACNC: 0.89 UIU/ML (ref 0.36–3.74)
VENTRICULAR RATE, ECG03: 77 BPM
WBC # BLD AUTO: 8.2 K/UL (ref 3.6–11)

## 2017-03-03 PROCEDURE — 74011250636 HC RX REV CODE- 250/636: Performed by: INTERNAL MEDICINE

## 2017-03-03 PROCEDURE — 84484 ASSAY OF TROPONIN QUANT: CPT | Performed by: INTERNAL MEDICINE

## 2017-03-03 PROCEDURE — 74011250637 HC RX REV CODE- 250/637: Performed by: INTERNAL MEDICINE

## 2017-03-03 PROCEDURE — 36415 COLL VENOUS BLD VENIPUNCTURE: CPT | Performed by: INTERNAL MEDICINE

## 2017-03-03 PROCEDURE — 77010033678 HC OXYGEN DAILY

## 2017-03-03 PROCEDURE — 85025 COMPLETE CBC W/AUTO DIFF WBC: CPT | Performed by: INTERNAL MEDICINE

## 2017-03-03 PROCEDURE — 94640 AIRWAY INHALATION TREATMENT: CPT

## 2017-03-03 PROCEDURE — 77030027138 HC INCENT SPIROMETER -A

## 2017-03-03 PROCEDURE — 71250 CT THORAX DX C-: CPT

## 2017-03-03 PROCEDURE — 65660000000 HC RM CCU STEPDOWN

## 2017-03-03 PROCEDURE — 93306 TTE W/DOPPLER COMPLETE: CPT

## 2017-03-03 PROCEDURE — 84443 ASSAY THYROID STIM HORMONE: CPT | Performed by: PHYSICIAN ASSISTANT

## 2017-03-03 PROCEDURE — 74011000250 HC RX REV CODE- 250: Performed by: INTERNAL MEDICINE

## 2017-03-03 PROCEDURE — 80053 COMPREHEN METABOLIC PANEL: CPT | Performed by: INTERNAL MEDICINE

## 2017-03-03 RX ORDER — LEVOFLOXACIN 750 MG/1
750 TABLET ORAL EVERY 24 HOURS
Status: DISCONTINUED | OUTPATIENT
Start: 2017-03-03 | End: 2017-03-07 | Stop reason: HOSPADM

## 2017-03-03 RX ORDER — GUAIFENESIN 100 MG/5ML
81 LIQUID (ML) ORAL
Status: COMPLETED | OUTPATIENT
Start: 2017-03-03 | End: 2017-03-03

## 2017-03-03 RX ORDER — METOPROLOL SUCCINATE 25 MG/1
25 TABLET, EXTENDED RELEASE ORAL DAILY
Status: DISCONTINUED | OUTPATIENT
Start: 2017-03-03 | End: 2017-03-07 | Stop reason: HOSPADM

## 2017-03-03 RX ORDER — ATORVASTATIN CALCIUM 10 MG/1
10 TABLET, FILM COATED ORAL DAILY
Status: DISCONTINUED | OUTPATIENT
Start: 2017-03-03 | End: 2017-03-07 | Stop reason: HOSPADM

## 2017-03-03 RX ORDER — FUROSEMIDE 10 MG/ML
20 INJECTION INTRAMUSCULAR; INTRAVENOUS ONCE
Status: COMPLETED | OUTPATIENT
Start: 2017-03-03 | End: 2017-03-03

## 2017-03-03 RX ADMIN — ENOXAPARIN SODIUM 40 MG: 40 INJECTION SUBCUTANEOUS at 09:54

## 2017-03-03 RX ADMIN — IPRATROPIUM BROMIDE AND ALBUTEROL SULFATE 3 ML: .5; 2.5 SOLUTION RESPIRATORY (INHALATION) at 01:01

## 2017-03-03 RX ADMIN — Medication 10 ML: at 13:27

## 2017-03-03 RX ADMIN — FUROSEMIDE 20 MG: 10 INJECTION, SOLUTION INTRAMUSCULAR; INTRAVENOUS at 14:58

## 2017-03-03 RX ADMIN — ATORVASTATIN CALCIUM 10 MG: 10 TABLET, FILM COATED ORAL at 13:26

## 2017-03-03 RX ADMIN — Medication 10 ML: at 05:07

## 2017-03-03 RX ADMIN — LEVOFLOXACIN 750 MG: 750 TABLET, FILM COATED ORAL at 22:24

## 2017-03-03 RX ADMIN — ENOXAPARIN SODIUM 40 MG: 40 INJECTION SUBCUTANEOUS at 22:24

## 2017-03-03 RX ADMIN — TRIAMTERENE AND HYDROCHLOROTHIAZIDE 1 TABLET: 37.5; 25 TABLET ORAL at 09:54

## 2017-03-03 RX ADMIN — IPRATROPIUM BROMIDE AND ALBUTEROL SULFATE 3 ML: .5; 2.5 SOLUTION RESPIRATORY (INHALATION) at 20:20

## 2017-03-03 RX ADMIN — ASPIRIN 81 MG CHEWABLE TABLET 81 MG: 81 TABLET CHEWABLE at 13:26

## 2017-03-03 RX ADMIN — METOPROLOL SUCCINATE 25 MG: 25 TABLET, FILM COATED, EXTENDED RELEASE ORAL at 13:26

## 2017-03-03 RX ADMIN — Medication 10 ML: at 22:25

## 2017-03-03 RX ADMIN — LORATADINE 10 MG: 10 TABLET ORAL at 09:54

## 2017-03-03 NOTE — CDMP QUERY
1.    The [address] [place] [address] has issued a statement indicating that, \"Individuals who are overweight, obese, or morbidly obese are at an increased risk for certain medical conditions when compared to persons of normal weight. Therefore, these conditions are always clinically significant and reportable when documented by the provider. \"    Review of the documentation for this patient demonstrates the clinical indicators of a BMI of 43.4 (height of 5 ft. 6 in. with weight of 121.8 kg ). Please clarify if the patient has a diagnosis associated with those findings:  _____  Obesity (BMI of 30-39. 9)  _____  Morbid Obesity  (BMI 40 or greater)  ______Overweight (BMI 25-29. 9)  _____  Other weight status (specify  status)  _____  Unable to determine      Thanks for your time.     Osman Talley RN, BSN  [phone]

## 2017-03-03 NOTE — PROGRESS NOTES
[facility] Dosing Services: 3/2/17  Pharmacy note for Dr Louise Cerna regarding Lovenox dose adjustment for increased BMI:    Wt Readings from Last 1 Encounters:   03/02/17 124.3 kg (274 lb)       Ht Readings from Last 1 Encounters:   03/02/17 167.6 cm (66\")           Previous Dose 40mg sq q24h   Creatinine Clearance Estimated Creatinine Clearance: 124.3 mL/min (based on Cr of 0.64). Creatinine Lab Results   Component Value Date/Time    Creatinine 0.64 03/02/2017 05:47 PM       Platelet Lab Results   Component Value Date/Time    PLATELET 118 78/17/2663 05:47 PM      H/H Lab Results   Component Value Date/Time    HGB 14.6 03/02/2017 05:47 PM           Pharmacist made change to enoxaparin therapy based on:    [ x ] BMI: dose changed to:  40mg sq q12h    - Pharmacy to automatically make dose adjustment for obesity (BMI greater than 40)  - Pharmacy to make dose rounding adjustments per [facility] dose adjustment scale.       Leann Kang PHARMD . Contact information: [phone]

## 2017-03-03 NOTE — ROUTINE PROCESS
TRANSFER - IN REPORT:    Verbal report received from Heather (name) on Jose Overton  being received from [facility](unit) for routine progression of care      Report consisted of patients Situation, Background, Assessment and   Recommendations(SBAR). Information from the following report(s) SBAR, ED Summary, [facility] and Recent Results was reviewed with the receiving nurse. Opportunity for questions and clarification was provided. Assessment completed upon patients arrival to unit and care assumed.

## 2017-03-03 NOTE — CONSULTS
Name: [address]: [facility]   : 1955 Admit Date: 3/2/2017   Phone: [phone]  Room: [facility]/   PCP: Allie Cloud MD  MRN: [id number]   Date: 3/3/2017  Code: Full Code        HPI:    Chart and notes reviewed. Data reviewed. I review the patient's current medications in the medical record at each encounter. I have evaluated and examined the patient. 9:24 AM       History was obtained from patient. I was asked by Carmen Higgins MD to see Eleazar Dodd in consultation for a chief complaint of progressive dyspnea. Ms. Arturo Alfaro is a pleasant 64year old female who presented to the [address] [place] [address] ED with 2 weeks of worsening SOB. Sats noted to be 88% on RA in her PCP's office. BP uncontrolled at presentation to the ED. States she was diagnosed with bronchitis last December and treated with Eri Mc and Advair. States her cough never completely improved. Reports cough to be dry and denies chest congestion. Denies fever or chills. States SOB does not seem to be affected by rest, activity, or lying flat. Denies known sick contacts. States one of her coworkers Jimmie Mention in the office. Patient with history of RA for which she is on Orencia at baseline. Denies CP. Reports ~ 20lb weight gain in the last couple weeks with associated LE/pedal edema. Received one 20mg IV dose of Lasix in the ED. Denies history of lung or heart disease. Reports HTN. She does not use home inhalers, nebs, or home O2 at baseline. States she did use some leftover Advair which provided some symptom improvement. She is a former smoker. Denies known history of sleep apnea. She is unaware of snoring and denies daytime somnolence. Chest CTA is personally visualized. There is no evidnece of PE. There is atelectasis/consolidation in the posterior lower lobes bilaterally. The upper lung zones are clear. There is cardiomegaly and pericardial effusion.     WBC 8.2  Hgb 15.4  Trop 0.05 (down from 0.09)  proBNP 2667    Past Medical History:   Diagnosis Date    Hypertension     RA (rheumatoid arthritis) ([address]        Past Surgical History:   Procedure Laterality Date    HX GYN      R vulvectomy       Family History   Problem Relation Age of Onset    Heart Disease Father     Hypertension Father     Stroke Father     Heart Disease Mother     Hypertension Mother     Hypertension Sister     Hypertension Brother        Social History   Substance Use Topics    Smoking status: Former Smoker    Smokeless tobacco: Not on file    Alcohol use Yes       Allergies   Allergen Reactions    Bee Sting [Sting, Bee] Anaphylaxis    Codeine Hives and Shortness of Breath     bp drop     Methotrexate Nausea and Vomiting    Penicillins Hives and Shortness of Breath     bp drop       Current Facility-Administered Medications   Medication Dose Route Frequency    sodium chloride (NS) flush 5-10 mL  5-10 mL IntraVENous Q8H    sodium chloride (NS) flush 5-10 mL  5-10 mL IntraVENous PRN    acetaminophen (TYLENOL) tablet 650 mg  650 mg Oral Q4H PRN    naloxone (NARCAN) injection 0.4 mg  0.4 mg IntraVENous PRN    diphenhydrAMINE (BENADRYL) injection 12.5 mg  12.5 mg IntraVENous Q4H PRN    prochlorperazine (COMPAZINE) injection 5 mg  5 mg IntraVENous Q8H PRN    enoxaparin (LOVENOX) injection 40 mg  40 mg SubCUTAneous Q12H    levoFLOXacin (LEVAQUIN) 750 mg in D5W IVPB  750 mg IntraVENous Q24H    albuterol-ipratropium (DUO-NEB) 2.5 MG-0.5 MG/3 ML  3 mL Nebulization Q6H RT    hydrALAZINE (APRESOLINE) 20 mg/mL injection 10 mg  10 mg IntraVENous Q6H PRN    ergocalciferol (ERGOCALCIFEROL) capsule 50,000 Units  50,000 Units Oral Q7D    loratadine (CLARITIN) tablet 10 mg  10 mg Oral DAILY    triamterene-hydroCHLOROthiazide (MAXZIDE) 37.5-25 mg per tablet 1 Tab  1 Tab Oral DAILY         REVIEW OF SYSTEMS   Negative except as stated in the HPI.       Physical Exam:   Visit Vitals    /76 (BP 1 Location: Left arm, BP Patient Position: At rest)    Pulse 83    Temp 97.5 °F (36.4 °C)    Resp 24    Ht 5' 6\" (1.676 m)    Wt 121.8 kg (268 lb 9.6 oz)    SpO2 90%    BMI 43.35 kg/m2   on 3L    General:  Alert, cooperative, no distress, appears stated age. Head:  Normocephalic, without obvious abnormality, atraumatic. Eyes:  Conjunctivae/corneas clear. Nose: Nares normal. Septum midline. Mucosa normal.    Throat: Lips, mucosa, and tongue normal.  Class 4 airway. Neck: Supple, symmetrical, trachea midline, no adenopathy. Lungs:   Trace crackles in the left base, otherwise clear to auscultation bilaterally. No wheeze or rhonchi. No increased WOB or accessory muscle use. Chest wall:  No tenderness or deformity. Heart:  Regular rate and rhythm, S1, S2 normal, no murmur, click, rub or gallop. Abdomen:   Obese, soft, non-tender. Bowel sounds normal. No masses,  No organomegaly. Extremities: Extremities normal, atraumatic, no cyanosis, + peropheral edema. Pulses: 2+ and symmetric all extremities. Skin: Skin color, texture, turgor normal. No rashes or lesions   Lymph nodes: Cervical, supraclavicular nodes normal.   Neurologic: Grossly nonfocal       Lab Results   Component Value Date/Time    Sodium 141 03/03/2017 04:40 AM    Potassium 4.1 03/03/2017 04:40 AM    Chloride 100 03/03/2017 04:40 AM    CO2 38 03/03/2017 04:40 AM    BUN 16 03/03/2017 04:40 AM    Creatinine 0.72 03/03/2017 04:40 AM    Glucose 100 03/03/2017 04:40 AM    Calcium 8.3 03/03/2017 04:40 AM       Lab Results   Component Value Date/Time    WBC 8.2 03/03/2017 04:40 AM    HGB 15.4 03/03/2017 04:40 AM    PLATELET 509 73/18/9653 04:40 AM    MCV 95.0 03/03/2017 04:40 AM       Lab Results   Component Value Date/Time    AST (SGOT) 30 03/03/2017 04:40 AM    Alk.  phosphatase 98 03/03/2017 04:40 AM    Protein, total 6.9 03/03/2017 04:40 AM    Albumin 2.8 03/03/2017 04:40 AM    Globulin 4.1 03/03/2017 04:40 AM       No results found for: IRON, FE, TIBC, IBCT, PSAT, FERR    No results found for: SR, CRP, STANLEY, ANAIGG, RA, RPR, RPRT, VDRLT, VDRLS, TSH, TSHEXT     No results found for: PH, PHI, PCO2, PCO2I, PO2, PO2I, HCO3, HCO3I, FIO2, FIO2I    Lab Results   Component Value Date/Time    Troponin-I, Qt. 0.05 03/03/2017 12:48 AM        No results found for: CULT    No results found for: TOXA1, RPR, HBCM, HBSAG, HAAB, HCAB, HCAB1, HAAT, G6PD, CRYAC, HIVGT, HIVR, HIV1, HIV12, HIVPC, HIVRPI    No results found for: VANCT, CPK    No results found for: COLOR, APPRN, SPGRU, TABATHA, PROTU, GLUCU, KETU, BILU, BLDU, UROU, ASHKAN, LEUKU, WBCU, RBCU, UEPI, BACTU, CASTS, UCRY    IMPRESSION  · Acute hypoxic respiratory failure  · Dyspnea  · Pericardial effusion  · HTN  · RA  · Former smoker    PLAN  · Wean O2 as able to keep sats > 90%  · Check overnight oximetry  · Duonebs  · Check noncontrast high resolution chest CT to evaluated for potential ILD  · Check viral panel  · Check TSH  · Cardiology consult pending  · F/U ECHO  · BP control per primary team  · DVT prophylaxis: Lovenox      Thank you for allowing us to participate in the care of this patient. We will be happy to follow along in her progress with you.     Kavon Smith, [address]

## 2017-03-03 NOTE — PROGRESS NOTES
9:04 AM  VM left with Cosme Schumacher, NP re pt statement of \"I will not be getting a procedure done here today without a second opinion. I want to eat breakfast. I do not want to see a NP. \" Awaiting return call. 7:45 PM  Bedside shift change report given to Harjeet Andrea (oncoming nurse) by [facility] (offgoing nurse). Report included the following information SBAR, Procedure Summary, Intake/Output, MAR and Recent Results.

## 2017-03-03 NOTE — PROGRESS NOTES
3-3-2017 CASE MANAGEMENT NOTE:  I met with the pt to determine potential discharge needs. The pt lives with her , Oksana Flannery ([phone], [phone]), in a 2-story house. She is independent with her ADL's, works full-time, has no DME and drives. Her PCP is Dr. Garth Zuñiga. She has prescription drug coverage and gets her medications from [facility]. She does not anticipate any discharge needs. Care Management Interventions  PCP Verified by CM:  Yes (Dr. Garth Zuñiga)  Discharge Durable Medical Equipment: No  Physical Therapy Consult: No  Occupational Therapy Consult: No  Speech Therapy Consult: No  Current Support Network: Lives with Spouse, Own Home  Confirm Follow Up Transport: Family  Plan discussed with Pt/Family/Caregiver: Yes  Discharge Location  Discharge Placement:  (Home)    MICHAEL Burnham, CM

## 2017-03-03 NOTE — PROGRESS NOTES
TRANSFER REPORT   TRANSFER - IN REPORT:    Verbal report received from Kita Garcia RN(name) on [facility]  being received from ER (unit) for routine progression of care      Report consisted of patients Situation, Background, Assessment and   Recommendations(SBAR). Information from the following report(s) SBAR, Kardex and MAR was reviewed with the receiving nurse. Opportunity for questions and clarification was provided. Assessment completed upon patients arrival to unit and care assumed. SHIFT ASSESSMENT    2335 Pt received on the unit not in distress. Stable VS.    0021 Primary Nurse Alisson Chaney RN and Luz Buenrostro RN performed a dual skin assessment on this patient Impairment noted- see wound doc flow sheet (small, minor bruises from IV) Augustus score is 22    0659 Pt refuses flu vaccine. END SHIFT REPORT    Bedside and Verbal shift change report given to [employer] (oncoming nurse) by Jonel Bhatti RN (offgoing nurse). Report included the following information SBAR, Kardex, MAR and Cardiac Rhythm sinus rhythm.

## 2017-03-03 NOTE — PROGRESS NOTES
[facility]  [address]  [phone]      Medical Progress Note      NAME: Lisa Almanza   :  1955  MRM:  [id number]    Date/Time: 3/3/2017  7:39 AM       Assessment and Plan:   1. Acute respiratory failure with hypoxia ([facility] (3/2/2017): unclear etiology. Continue supplemental oxygen to keep SAO2 > 90%. . Consult Pulmonology     2. SOB (shortness of breath) (3/2/2017)/Pericardial effusion (3/2/2017)/ Elevated troponin (3/2/2017): ?pneumonia, ? CHF. Obtain an Echocardiogram. Serial cardiac enzymes. Empiric IV levofloxacin. Consult cardiology.      3. Rheumatoid arthritis involving multiple sites with positive rheumatoid factor ([facility] (10/7/2016): Holding Orencia     4. HTN (hypertension), benign (3/2/2017): BP better controlled. On Maxide    5. Morbid obesity. Would benefit from wt loss. Subjective:     Chief Complaint:  SOB is better     ROS:  (bold if positive, if negative)    SOB/FOX   Tolerating PT  Tolerating Diet        Objective:     Last 24hrs VS reviewed since prior progress note.  Most recent are:    Visit Vitals    /76 (BP 1 Location: Left arm, BP Patient Position: At rest)    Pulse 83    Temp 97.5 °F (36.4 °C)    Resp 24    Ht 5' 6\" (1.676 m)    Wt 121.8 kg (268 lb 9.6 oz)    SpO2 90%    BMI 43.35 kg/m2     SpO2 Readings from Last 6 Encounters:   17 90%   17 (!) 80%   10/28/16 98%   16 94%    O2 Flow Rate (L/min): 3 l/min     Intake/Output Summary (Last 24 hours) at 17 0739  Last data filed at 17 2345   Gross per 24 hour   Intake              870 ml   Output              700 ml   Net              170 ml        Physical Exam:    Gen:  Well-developed, well-nourished, in no acute distress  HEENT:  Pink conjunctivae, PERRL, hearing intact to voice, moist mucous membranes  Neck:  Supple, without masses, thyroid non-tender  Resp:  No accessory muscle use, clear breath sounds without wheezes rales or rhonchi  Card:  No murmurs, normal S1, S2 without thrills, bruits or peripheral edema  Abd:  Soft, non-tender, non-distended, normoactive bowel sounds are present, no palpable organomegaly and no detectable hernias  Lymph:  No cervical or inguinal adenopathy  Musc:  No cyanosis or clubbing  Skin:  No rashes or ulcers, skin turgor is good  Neuro:  Cranial nerves are grossly intact, no focal motor weakness, follows commands appropriately  Psych:  Good insight, oriented to person, place and time, alert  __________________________________________________________________  Medications Reviewed: (see below)  Medications:     Current Facility-Administered Medications   Medication Dose Route Frequency    sodium chloride (NS) flush 5-10 mL  5-10 mL IntraVENous Q8H    sodium chloride (NS) flush 5-10 mL  5-10 mL IntraVENous PRN    acetaminophen (TYLENOL) tablet 650 mg  650 mg Oral Q4H PRN    naloxone (NARCAN) injection 0.4 mg  0.4 mg IntraVENous PRN    diphenhydrAMINE (BENADRYL) injection 12.5 mg  12.5 mg IntraVENous Q4H PRN    prochlorperazine (COMPAZINE) injection 5 mg  5 mg IntraVENous Q8H PRN    enoxaparin (LOVENOX) injection 40 mg  40 mg SubCUTAneous Q12H    levoFLOXacin (LEVAQUIN) 750 mg in D5W IVPB  750 mg IntraVENous Q24H    albuterol-ipratropium (DUO-NEB) 2.5 MG-0.5 MG/3 ML  3 mL Nebulization Q6H RT    hydrALAZINE (APRESOLINE) 20 mg/mL injection 10 mg  10 mg IntraVENous Q6H PRN    ergocalciferol (ERGOCALCIFEROL) capsule 50,000 Units  50,000 Units Oral Q7D    loratadine (CLARITIN) tablet 10 mg  10 mg Oral DAILY    triamterene-hydroCHLOROthiazide (MAXZIDE) 37.5-25 mg per tablet 1 Tab  1 Tab Oral DAILY        Lab Data Reviewed: (see below)  Lab Review:     Recent Labs      03/03/17   0440  03/02/17   1747   WBC  8.2  9.3   HGB  15.4  14.6   HCT  51.2*  48.0*   PLT  244  260     Recent Labs      03/03/17   0440  03/02/17   1747   NA  141  140   K  4.1  4.7   CL  100  99   CO2  38*  38*   GLU 100  94   BUN  16  17   CREA  0.72  0.64   CA  8.3*  8.4*   ALB  2.8*  2.9*   TBILI  0.3  0.5   SGOT  30  42*   ALT  63  70     No results found for: GLUCPOC  No results for input(s): PH, PCO2, PO2, HCO3, FIO2 in the last 72 hours. No results for input(s): INR in the last 72 hours. No lab exists for component: INREXT  All Micro Results     None          I have reviewed notes of prior 24hr. Other pertinent lab:       Total time spent with patient: [address] discussed with: Patient, Nursing Staff and >50% of time spent in counseling and coordination of care    Discussed:  Care Plan    Prophylaxis:  Lovenox    Disposition:  Home w/Family           ___________________________________________________    Attending Physician: Natasha Sena MD

## 2017-03-03 NOTE — H&P
[facility]  [address] [place] [address] 19  [phone]    Admission History and Physical      NAME:              Jose Overton   :   1955   MRN:  [id number]     PCP:  Feliciano Herron MD     Date:     3/2/2017     Chief  Complaint: SOB    History Of Presenting Illness:       Ms. Gerda Alejo is a 64 y.o. female who is being admitted for acute SOB. Ms. Gerda Alejo presented to our Emergency Department today complaining of SOB, at rest, worse with exertion and associated with a dry cough with no fever. She had an upper respiratory infection a few months back which resolved. She developed progressively worsening dyspnea, fatigue and was reviewed by her PCP today and sent to the ED. A CTA chest done due to her hypoxia was negative for a PE but showed possible air space disease with a pericardial effusion. She is now on supplemental oxygen with a non re-breather mask. She will be admitted for further management. Allergies   Allergen Reactions    Bee Sting [Sting, Bee] Anaphylaxis    Codeine Hives and Shortness of Breath     bp drop     Methotrexate Nausea and Vomiting    Penicillins Hives and Shortness of Breath     bp drop       Prior to Admission medications    Medication Sig Start Date End Date Taking? Authorizing Provider   fluticasone-salmeterol (ADVAIR DISKUS) 100-50 mcg/dose diskus inhaler Take 1 Puff by inhalation every twelve (12) hours. Historical Provider   loratadine (CLARITIN) 10 mg tablet Take 10 mg by mouth. Historical Provider   triamterene-hydroCHLOROthiazide (MAXZIDE) 37.5-25 mg per tablet Take 1 Tab by mouth daily. 16   Feliciano Herron MD   ergocalciferol (ERGOCALCIFEROL) 50,000 unit capsule TK 1 C PO ONCE A WEEK.  THIS REPLACES DAILY VITAMIN D 16   Historical Provider   EPINEPHrine (EPIPEN) 0.3 mg/0.3 mL (1:1,000) injection 0.3 mL by IntraMUSCular route once as needed. 2 pens 3/25/16   April Barraza MD   lidocaine HCl (XYLOCAINE) 3 % topical cream Apply  to affected area two (2) times a day. 3/25/16   April Barraza MD   ORENCIA 125 mg/mL syrg Inject once weekly 1/29/16   Historical Provider   vitamin c-vitamin e cap Take  by mouth. Historical Provider   cholecalciferol (VITAMIN D3) 1,000 unit tablet Take  by mouth daily. Historical Provider       Past Medical History:   Diagnosis Date    Hypertension     RA (rheumatoid arthritis) ([facility] [address]         Past Surgical History:   Procedure Laterality Date    HX GYN      R vulvectomy       Social History   Substance Use Topics    Smoking status: Former Smoker    Smokeless tobacco: Not on file    Alcohol use Yes        Family History   Problem Relation Age of Onset    Heart Disease Father     Hypertension Father     Stroke Father     Heart Disease Mother     Hypertension Mother     Hypertension Sister     Hypertension Brother         Review of Systems:    Constitutional ROS: no fever, chills, rigors or night sweats  Respiratory ROS: + cough, - sputum, - hemoptysis, + dyspnea and no pleuritic pain. Cardiovascular ROS:no palpitations, orthopnea, PND or syncope  Endocrine ROS: no polydispsia, polyuria, heat or cold intolerance or major weight change.   Gastrointestinal ROS: no dysphagia, abdominal pain, nausea, vomiting, diarrhea or any bleeding   Genito-Urinary ROS: no dysuria, frequency, hematuria, retention or flank pain  Musculoskeletal ROS: no joint pain, swelling or muscular tenderness  Neurological ROS: no headache, confusion, focal weakness or any other neurological symptoms  Psychiatric ROS: no depression, anxiety, mood swings  Dermatological ROS: no rash, pruritis, or urticaria    Examination:    Vital signs:    Visit Vitals    BP (!) 170/95    Pulse 91    Temp 98.3 °F (36.8 °C)    Resp 28    Ht 5' 6\" (1.676 m)    Wt 124.3 kg (274 lb)    LMP 02/23/2013    SpO2 95%    BMI 44.22 kg/m2 Physical Examination:    General:  Weak and ill looking patient in some respiratory distress  Eyes: Pink conjunctivae, PERRLA with no discharge. Ear, Nose & Throat: No ottorrhea, rhinorrhea and has moist mucous membranes. On a non re breather oxygen mask  Respiratory:  + accessory muscle use, generally decreased but overall clear breath sounds with scant crackles. + wheezes  Cardiovascular:  + JVD or murmurs, regular and normal S1, S2 without thrills, bruits. + peripheral edema  GI & :  Soft abdomen, obese, non saul, non-distended, normoactive bowel sounds with no palpable organomegaly  Heme:  No cervical or axillary adenopathy. Musculoskeletal:  No cyanosis, clubbing, atrophy or deformities  Skin:  No rashes, bruising or ulcers   Neurological: Awake and alert, speech is clear, CNs 2-12 are grossly intact and otherwise non focal  Psychiatric:  Has a good insight and is oriented x 3  ________________________________________________________________________    Data Review:    Labs:    Recent Labs      03/02/17   1747   WBC  9.3   HGB  14.6   HCT  48.0*   PLT  260     Recent Labs      03/02/17   1747   NA  140   K  4.7   CL  99   CO2  38*   GLU  94   BUN  17   CREA  0.64   CA  8.4*   ALB  2.9*   SGOT  42*   ALT  70     No components found for: GLPOC  No results for input(s): PH, PCO2, PO2, HCO3, FIO2 in the last 72 hours. No results for input(s): INR in the last 72 hours. No lab exists for component: INREXT    Radiological Studies:      CTA chest - No pulmonary embolus. Bilateral lower lobe atelectasis or airspace disease. Cardiomegaly with pericardial effusion. Other Medical tests:    Personally reviewed EKG: Normal rate, rhythm, axis and intervals. and No acute changes suggestive of ischemia    I have reviewed old medical records available.     Assessment & Impression:     Ms. Alvin Lozano is a 64 y.o. female being evaluated for:     Active Problems:    Rheumatoid arthritis involving multiple sites with positive rheumatoid factor (HCC) (10/7/2016)      SOB (shortness of breath) (3/2/2017)      HTN (hypertension), benign (3/2/2017)      Acute respiratory failure with hypoxia ([address] (3/2/2017)      Elevated troponin (3/2/2017)      Pericardial effusion (3/2/2017)       Plan of management:    Acute respiratory failure with hypoxia ([address] (3/2/2017): possible etiologies maybe pneumonia, ?relation to RA vs cardiogenic. Admit to hospital. Supplemental oxygen. Will consult Pulmonology    SOB (shortness of breath) (3/2/2017)/Pericardial effusion (3/2/2017)/ Elevated troponin (3/2/2017): ?pneumonia, ? CHF. Obtain an Echocardiogram. Serial cardiac enzymes. Empiric IV levofloxacin. Consult cardiology. Rheumatoid arthritis involving multiple sites with positive rheumatoid factor ([facility] [address] (10/7/2016): Holding Orencia    HTN (hypertension), benign (3/2/2017): BP elevated.  Resume Maxide    Code Status:  Full    Surrogate decision maker: Family    Risk of deterioration: high      Total time spent for the care of the patient: [address] discussed with: Patient, Family, Nursing Staff and ED physician    Discussed:  Code Status, Care Plan and D/C Planning    Prophylaxis:  Lovenox    Probable Disposition:  Home w/Family           ___________________________________________________    Attending Physician: Laura Ross MD

## 2017-03-03 NOTE — PROGRESS NOTES
Patient was down in CT for an HiRes lung scan. Pt unable to lay down after several attempts. Told pt we would try later. I will call later this afternoon and check on pts ability to lay flat.

## 2017-03-03 NOTE — PROGRESS NOTES
[facility]: MED RECONCILIATION    Comments/Recommendations: - Verified allergies as documented  - Patient is aware of what medications she takes and reports adherence  - She states that she was prescribed advair for bronchitis and does not take it scheduled for asthma/ COPD. She had the medication at home so she used it for her current symptoms.  - She states that if she is still here on Saturday, her  can bring the Omelia Simpers in from home for her injection. Medications added:     · Vitamin C  · Preservision Areds    Medications removed:    · Lidocaine 3% topical cream    Medications adjusted:    · None    Information obtained from: Patient, refill history    Significant PMH/Disease States:   Patient Active Problem List   Diagnosis Code    Rheumatoid arthritis involving multiple sites with positive rheumatoid factor ([facility] M05.79    Essential hypertension I10    SOB (shortness of breath) R06.02    HTN (hypertension), benign I10    Acute respiratory failure with hypoxia ([facility]) J96.01    Elevated troponin R79.89    Pericardial effusion I31.3       Chief Complaint for this Admission:   Chief Complaint   Patient presents with    Shortness of Breath       Allergies: Bee sting [sting, bee]; Codeine; Methotrexate; and Penicillins      Prior to Admission Medications:   Prior to Admission Medications   Prescriptions Last Dose Informant Patient Reported? Taking? EPINEPHrine (EPIPEN) 0.3 mg/0.3 mL (1:1,000) injection Unknown at Unknown time Self No No   Si.3 mL by IntraMUSCular route once as needed. 2 pens   ORENCIA 125 mg/mL syrg 2017 at AM Self Yes No   Si mg by SubCUTAneous route every seven (7) days. On Saturday   ascorbic acid, vitamin C, (VITAMIN C) 250 mg tablet 3/2/2017 at AM Self Yes Yes   Sig: Take 250 mg by mouth daily. cholecalciferol (VITAMIN D3) 1,000 unit tablet 3/2/2017 at AM Self Yes Yes   Sig: Take 1,000 Units by mouth daily.  Except on Friday   ergocalciferol (VITAMIN D2) 50,000 unit capsule 2/24/2017 at AM Self Yes Yes   Sig: Take 50,000 Units by mouth every seven (7) days. On Friday   Indications: VITAMIN D DEFICIENCY   fluticasone-salmeterol (ADVAIR DISKUS) 100-50 mcg/dose diskus inhaler 3/2/2017 at AM Self Yes Yes   Sig: Take 1 Puff by inhalation every twelve (12) hours. loratadine (CLARITIN) 10 mg tablet 3/2/2017 at AM Self Yes Yes   Sig: Take 10 mg by mouth daily. triamterene-hydroCHLOROthiazide (MAXZIDE) 37.5-25 mg per tablet 3/2/2017 at AM Self No Yes   Sig: Take 1 Tab by mouth daily. vitamins  A,C,E-zinc-copper (OCUVITE PRESERVISION) [phone] unit-mg-unit tab tablet 3/2/2017 at AM Self Yes Yes   Sig: Take 1 Tab by mouth two (2) times a day.       Facility-Administered Medications: None       Thank you,  Stew Gil, PharmD     Contact: [phone]

## 2017-03-03 NOTE — CONSULTS
Reason for Consult: Positive Troponin, Respiratory Failure. HPI: Daniel Laurent is a 64 y.o. female with history of hypertension, RA admitted with progressive worsening SOB from past 1 week. Prior to this she was diagnosed with bronchitis. She does not have fever or chills. At presentation she was hypoxic. No associated chest pain or palpitations. EKG non ischemic  Trop flat weak positive  CT chest show right upper lobe infiltrate  ? Small pericardial effusion on CT    ROS: No fever, chills, abdominal pain, nausea, vomiting, diarrhea, lightheadedness, dizziness, presyncope or syncope. No dysuia or constipation. Past Medical History:   Diagnosis Date    Hypertension     RA (rheumatoid arthritis) ([facility] [address]             Past Surgical History:   Procedure Laterality Date    HX GYN      R vulvectomy             Family History   Problem Relation Age of Onset    Heart Disease Father     Hypertension Father     Stroke Father     Heart Disease Mother     Hypertension Mother     Hypertension Sister     Hypertension Brother            Social History     Social History    Marital status:      Spouse name: N/A    Number of children: N/A    Years of education: N/A     Occupational History    Not on file.      Social History Main Topics    Smoking status: Former Smoker    Smokeless tobacco: Not on file    Alcohol use Yes    Drug use: No    Sexual activity: Yes     Partners: Male     Other Topics Concern    Not on file     Social History Narrative         Allergies   Allergen Reactions    Bee Sting [Sting, Bee] Anaphylaxis    Codeine Hives and Shortness of Breath     bp drop     Methotrexate Nausea and Vomiting    Penicillins Hives and Shortness of Breath     bp drop            Current Facility-Administered Medications   Medication Dose Route Frequency Provider Last Rate Last Dose    sodium chloride (NS) flush 5-10 mL  5-10 mL IntraVENous Q8H Bashir Torre MD   10 mL at 03/03/17 0566    sodium chloride (NS) flush 5-10 mL  5-10 mL IntraVENous PRN Brown Hester MD        acetaminophen (TYLENOL) tablet 650 mg  650 mg Oral Q4H PRN Brown Hester MD        naloxone [facility]) injection 0.4 mg  0.4 mg IntraVENous PRN Brown Hester MD        diphenhydrAMINE (BENADRYL) injection 12.5 mg  12.5 mg IntraVENous Q4H PRN Brown Hester MD        prochlorperazine (COMPAZINE) injection 5 mg  5 mg IntraVENous Q8H PRN Brown Hester MD        enoxaparin (LOVENOX) injection 40 mg  40 mg SubCUTAneous Q12H Brown Hester MD   40 mg at 03/03/17 0954    levoFLOXacin (LEVAQUIN) 750 mg in D5W IVPB  750 mg IntraVENous Q24H Brown Hester  mL/hr at 03/02/17 2100 750 mg at 03/02/17 2100    albuterol-ipratropium (DUO-NEB) 2.5 MG-0.5 MG/3 ML  3 mL Nebulization Q6H RT Brown Hester MD   3 mL at 03/03/17 0101    hydrALAZINE (APRESOLINE) 20 mg/mL injection 10 mg  10 mg IntraVENous Q6H PRN Brown Hester MD   10 mg at 03/02/17 2057    ergocalciferol (ERGOCALCIFEROL) capsule 50,000 Units  50,000 Units Oral Q7D Brown Hester MD   50,000 Units at 03/02/17 2250    loratadine (CLARITIN) tablet 10 mg  10 mg Oral DAILY Brown Hester MD   10 mg at 03/03/17 0954    triamterene-hydroCHLOROthiazide (MAXZIDE) 37.5-25 mg per tablet 1 Tab  1 Tab Oral DAILY Brown Hester MD   1 Tab at 03/03/17 0954        ROS:  12 point review of systems was performed. All negative except for HPI     Physical Exam:  Visit Vitals    BP (!) 155/99 (BP 1 Location: Left arm, BP Patient Position: At rest)    Pulse 83    Temp 98.1 °F (36.7 °C)    Resp 22    Ht 5' 6\" (1.676 m)    Wt 268 lb 9.6 oz (121.8 kg)    LMP 02/23/2013    SpO2 93%    BMI 43.35 kg/m2       Gen:  Well-developed, well-nourished, in no acute distress  HEENT:  Pink conjunctivae, PERRL, hearing intact to voice, moist mucous membranes  Neck:  Supple, without masses, thyroid non-tender  Resp:  No accessory muscle use, Right upper zone fine rales.  No wheezes rales or rhonchi  Card:  No murmurs, normal S1, S2 without thrills, bruits or peripheral edema  Abd:  Soft, non-tender, non-distended, normoactive bowel sounds are present, no palpable organomegaly and no detectable hernias  Lymph:  No cervical or inguinal adenopathy  Musc:  No cyanosis or clubbing  Skin:  No rashes or ulcers, skin turgor is good  Neuro:  Cranial nerves are grossly intact, no focal motor weakness, follows commands appropriately  Psych:  Good insight, oriented to person, place and time, alert     Labs:     Lab Results  Component Value Date/Time   WBC 8.2 03/03/2017 04:40 AM   HGB 15.4 03/03/2017 04:40 AM   HCT 51.2 03/03/2017 04:40 AM   PLATELET 492 57/50/2169 04:40 AM   MCV 95.0 03/03/2017 04:40 AM       Lab Results  Component Value Date/Time   Glucose 100 03/03/2017 04:40 AM   Creatinine 0.72 03/03/2017 04:40 AM      No results found for: CHOL, CHOLX, CHLST, CHOLV, HDL, LDL, DLDL, LDLC, DLDLP, TGL, TGLX, TRIGL, [id number], TRIGP, CHHD, CHHDX    Lab Results  Component Value Date/Time   ALT (SGPT) 63 03/03/2017 04:40 AM   AST (SGOT) 30 03/03/2017 04:40 AM   Alk.  phosphatase 98 03/03/2017 04:40 AM   Bilirubin, total 0.3 03/03/2017 04:40 AM       No results found for: INR, PTMR, PTP, PT1, PT2   Lab Results  Component Value Date/Time   GFR est AA >60 03/03/2017 04:40 AM   GFR est non-AA >60 03/03/2017 04:40 AM   Creatinine 0.72 03/03/2017 04:40 AM   BUN 16 03/03/2017 04:40 AM   Sodium 141 03/03/2017 04:40 AM   Potassium 4.1 03/03/2017 04:40 AM   Chloride 100 03/03/2017 04:40 AM   CO2 38 03/03/2017 04:40 AM      No results found for: PSA, PSA2, PSAR1, PSA1, PSAR2, PSA3, PSAR3, [id number], [id number], PSALT  No results found for: TSH, TSH2, TSH3, TSHP, TSHELE, TSHEXT, TT3, T3U, T3UP, FRT3, FT3, FT4, FT4P, T4, T4P, FT4T, TT7, TSHEXT   Lab Results   Component Value Date/Time    Glucose 100 03/03/2017 04:40 AM      Lab Results   Component Value Date/Time    Troponin-I, Qt. 0.07 03/03/2017 09:25 AM Lab Results   Component Value Date/Time    NT pro-BNP 2667 03/02/2017 05:47 PM      Lab Results   Component Value Date/Time    Sodium 141 03/03/2017 04:40 AM    Potassium 4.1 03/03/2017 04:40 AM    Chloride 100 03/03/2017 04:40 AM    CO2 38 03/03/2017 04:40 AM    Anion gap 3 03/03/2017 04:40 AM    Glucose 100 03/03/2017 04:40 AM    BUN 16 03/03/2017 04:40 AM    Creatinine 0.72 03/03/2017 04:40 AM    BUN/Creatinine ratio 22 03/03/2017 04:40 AM    GFR est AA >60 03/03/2017 04:40 AM    GFR est non-AA >60 03/03/2017 04:40 AM    Calcium 8.3 03/03/2017 04:40 AM      Lab Results   Component Value Date/Time    Sodium 141 03/03/2017 04:40 AM    Potassium 4.1 03/03/2017 04:40 AM    Chloride 100 03/03/2017 04:40 AM    CO2 38 03/03/2017 04:40 AM    Anion gap 3 03/03/2017 04:40 AM    Glucose 100 03/03/2017 04:40 AM    BUN 16 03/03/2017 04:40 AM    Creatinine 0.72 03/03/2017 04:40 AM    BUN/Creatinine ratio 22 03/03/2017 04:40 AM    GFR est AA >60 03/03/2017 04:40 AM    GFR est non-AA >60 03/03/2017 04:40 AM    Calcium 8.3 03/03/2017 04:40 AM    Bilirubin, total 0.3 03/03/2017 04:40 AM    ALT (SGPT) 63 03/03/2017 04:40 AM    AST (SGOT) 30 03/03/2017 04:40 AM    Alk. phosphatase 98 03/03/2017 04:40 AM    Protein, total 6.9 03/03/2017 04:40 AM    Albumin 2.8 03/03/2017 04:40 AM    Globulin 4.1 03/03/2017 04:40 AM    A-G Ratio 0.7 03/03/2017 04:40 AM      No results found for: HBA1C, TXD7IMXH, HGBE8, PLD2PKIE, PQO1ZNAV        Recent Labs      03/03/17   0925   TROIQ  0.07*     Diagnostic Tests:   EKG: Sinus rhythm with non specific T changes, PRWP.        Problem List:     Problem List  Date Reviewed: 3/2/2017          Codes Class Noted    SOB (shortness of breath) ICD-10-CM: R06.02  ICD-9-CM: 786.05  3/2/2017        HTN (hypertension), benign ICD-10-CM: I10  ICD-9-CM: 401.1  3/2/2017        Acute respiratory failure with hypoxia (HCC) ICD-10-CM: J96.01  ICD-9-CM: 518.81  3/2/2017        Elevated troponin ICD-10-CM: R79.89  ICD-9-CM: 790.6  3/2/2017        Pericardial effusion ICD-10-CM: I31.3  ICD-9-CM: 423.9  3/2/2017        Rheumatoid arthritis involving multiple sites with positive rheumatoid factor ([facility] [address] ICD-10-CM: M05.79  ICD-9-CM: 714.0  10/7/2016        Essential hypertension ICD-10-CM: I10  ICD-9-CM: 401.9  10/7/2016              Plan:    1. Positive Toponin: Likely due hypoxic respiratory failure and supply demand ischemia but not NSTEMI. Will need further evaluation with Stress Nuclear which can be done as OP if she gets discharged over the weekend. Start ASA and Lipitor. 2. HTN: At presentation . She takes Maxzide at home and is continued here. Will add Toprol XL 25 daily. 3. Acute Respiratory Failure: Primary pneumonia and bronchitis. No clinical signs of cardiac cause for respiratory failure. 4. Pericardial Effusion on CT: Await Echo. Dmitri Garcia MD, [facility]

## 2017-03-03 NOTE — PROGRESS NOTES
Pt presents with SOB (shortness of breath)   Days in  LOS at this time is 1    Problem List  Date Reviewed: 3/2/2017          Codes Class Noted    SOB (shortness of breath) ICD-10-CM: R06.02  ICD-9-CM: 786.05  3/2/2017        HTN (hypertension), benign ICD-10-CM: I10  ICD-9-CM: 401.1  3/2/2017        Acute respiratory failure with hypoxia (HCC) ICD-10-CM: J96.01  ICD-9-CM: 518.81  3/2/2017        Elevated troponin ICD-10-CM: R79.89  ICD-9-CM: 790.6  3/2/2017        Pericardial effusion ICD-10-CM: I31.3  ICD-9-CM: 423.9  3/2/2017        Rheumatoid arthritis involving multiple sites with positive rheumatoid factor (HCC) ICD-10-CM: M05.79  ICD-9-CM: 714.0  10/7/2016        Essential hypertension ICD-10-CM: I10  ICD-9-CM: 401.9  10/7/2016              Active Problems:    Rheumatoid arthritis involving multiple sites with positive rheumatoid factor ([facility] [address] (10/7/2016)      SOB (shortness of breath) (3/2/2017)      HTN (hypertension), benign (3/2/2017)      Acute respiratory failure with hypoxia (HCC) (3/2/2017)      Elevated troponin (3/2/2017)      Pericardial effusion (3/2/2017)        Pt cardiac rhythm at this time is  NSR. Last documented Troponin   Recent Labs      03/03/17   0925   TROIQ  0.07*       Pt last three weights    Last 3 Recorded Weights in this Encounter    03/02/17 1738 03/03/17 0443   Weight: 124.3 kg (274 lb) 121.8 kg (268 lb 9.6 oz)    last weighed via    lost,  3 lbs. .     Pt has the following consults Consult Pulmonary/Intensivist for ICU management    Pt is currently taking   Current Facility-Administered Medications   Medication Dose Route Frequency    metoprolol succinate (TOPROL-XL) XL tablet 25 mg  25 mg Oral DAILY    atorvastatin (LIPITOR) tablet 10 mg  10 mg Oral DAILY    levoFLOXacin (LEVAQUIN) tablet 750 mg  750 mg Oral Q24H    sodium chloride (NS) flush 5-10 mL  5-10 mL IntraVENous Q8H    sodium chloride (NS) flush 5-10 mL  5-10 mL IntraVENous PRN    acetaminophen (TYLENOL) tablet 650 mg  650 mg Oral Q4H PRN    naloxone (NARCAN) injection 0.4 mg  0.4 mg IntraVENous PRN    diphenhydrAMINE (BENADRYL) injection 12.5 mg  12.5 mg IntraVENous Q4H PRN    prochlorperazine (COMPAZINE) injection 5 mg  5 mg IntraVENous Q8H PRN    enoxaparin (LOVENOX) injection 40 mg  40 mg SubCUTAneous Q12H    albuterol-ipratropium (DUO-NEB) 2.5 MG-0.5 MG/3 ML  3 mL Nebulization Q6H RT    hydrALAZINE (APRESOLINE) 20 mg/mL injection 10 mg  10 mg IntraVENous Q6H PRN    ergocalciferol (ERGOCALCIFEROL) capsule 50,000 Units  50,000 Units Oral Q7D    loratadine (CLARITIN) tablet 10 mg  10 mg Oral DAILY    triamterene-hydroCHLOROthiazide (MAXZIDE) 37.5-25 mg per tablet 1 Tab  1 Tab Oral DAILY       Meds held in the past 12 HOURS hours are: Patient declined Neb treatments. Due to: no change    UPDATE INTAKE AND OUTPUT    Intake/Output Summary (Last 24 hours) at 03/03/17 1634  Last data filed at 03/03/17 1318   Gross per 24 hour   Intake              870 ml   Output             1400 ml   Net             -530 ml       12 HOURS CHART SIGN OFF DONE BY Jacky Fuentes RN        Patient VERBALexpected daily goal for today is: 1. Patient had questions regarding diagnosis. She and  were both concerned about her oxygen demand. 2.Patient and  wanted to know if she could be weaned from oxygen or if she would need to go home with oxygen. Nurse goal for patient today:  Will continue to monitor respiratory status and management transport for CT of chest and 6 min. Walk to determine if she will go home with oxygen.

## 2017-03-04 ENCOUNTER — APPOINTMENT (OUTPATIENT)
Dept: CT IMAGING | Age: 62
DRG: 189 | End: 2017-03-04
Attending: INTERNAL MEDICINE
Payer: COMMERCIAL

## 2017-03-04 PROBLEM — G25.3 MULTIFOCAL MYOCLONUS: Status: ACTIVE | Noted: 2017-03-04

## 2017-03-04 PROBLEM — G93.40 ACUTE ENCEPHALOPATHY: Status: ACTIVE | Noted: 2017-03-04

## 2017-03-04 LAB
AMMONIA PLAS-SCNC: 18 UMOL/L
ARTERIAL PATENCY WRIST A: YES
BASE EXCESS BLDA CALC-SCNC: 13.4 MMOL/L
BASOPHILS # BLD AUTO: 0 K/UL (ref 0–0.1)
BASOPHILS # BLD: 0 % (ref 0–1)
BDY SITE: ABNORMAL
EOSINOPHIL # BLD: 0 K/UL (ref 0–0.4)
EOSINOPHIL NFR BLD: 0 % (ref 0–7)
ERYTHROCYTE [DISTWIDTH] IN BLOOD BY AUTOMATED COUNT: 15.4 % (ref 11.5–14.5)
GAS FLOW.O2 O2 DELIVERY SYS: 3 L/MIN
HCO3 BLDA-SCNC: 44 MMOL/L (ref 22–26)
HCT VFR BLD AUTO: 47.7 % (ref 35–47)
HGB BLD-MCNC: 14.5 G/DL (ref 11.5–16)
LYMPHOCYTES # BLD AUTO: 18 % (ref 12–49)
LYMPHOCYTES # BLD: 1.9 K/UL (ref 0.8–3.5)
MCH RBC QN AUTO: 29.4 PG (ref 26–34)
MCHC RBC AUTO-ENTMCNC: 30.4 G/DL (ref 30–36.5)
MCV RBC AUTO: 96.6 FL (ref 80–99)
MONOCYTES # BLD: 1 K/UL (ref 0–1)
MONOCYTES NFR BLD AUTO: 9 % (ref 5–13)
NEUTS SEG # BLD: 7.5 K/UL (ref 1.8–8)
NEUTS SEG NFR BLD AUTO: 73 % (ref 32–75)
PCO2 BLDA: 85 MMHG (ref 35–45)
PH BLDA: 7.33 [PH] (ref 7.35–7.45)
PLATELET # BLD AUTO: 237 K/UL (ref 150–400)
PO2 BLDA: 58 MMHG (ref 80–100)
RBC # BLD AUTO: 4.94 M/UL (ref 3.8–5.2)
SAO2 % BLD: 87 % (ref 92–97)
SAO2% DEVICE SAO2% SENSOR NAME: ABNORMAL
SERVICE CMNT-IMP: ABNORMAL
SPECIMEN SITE: ABNORMAL
T4 FREE SERPL-MCNC: 1.2 NG/DL (ref 0.8–1.5)
WBC # BLD AUTO: 10.4 K/UL (ref 3.6–11)

## 2017-03-04 PROCEDURE — 74011250636 HC RX REV CODE- 250/636: Performed by: INTERNAL MEDICINE

## 2017-03-04 PROCEDURE — 94660 CPAP INITIATION&MGMT: CPT

## 2017-03-04 PROCEDURE — 74011000250 HC RX REV CODE- 250: Performed by: INTERNAL MEDICINE

## 2017-03-04 PROCEDURE — 70450 CT HEAD/BRAIN W/O DYE: CPT

## 2017-03-04 PROCEDURE — 77010033678 HC OXYGEN DAILY

## 2017-03-04 PROCEDURE — 95816 EEG AWAKE AND DROWSY: CPT | Performed by: PSYCHIATRY & NEUROLOGY

## 2017-03-04 PROCEDURE — 36600 WITHDRAWAL OF ARTERIAL BLOOD: CPT | Performed by: INTERNAL MEDICINE

## 2017-03-04 PROCEDURE — 74011250636 HC RX REV CODE- 250/636: Performed by: PSYCHIATRY & NEUROLOGY

## 2017-03-04 PROCEDURE — 94640 AIRWAY INHALATION TREATMENT: CPT

## 2017-03-04 PROCEDURE — 82140 ASSAY OF AMMONIA: CPT | Performed by: INTERNAL MEDICINE

## 2017-03-04 PROCEDURE — 74011000258 HC RX REV CODE- 258: Performed by: PSYCHIATRY & NEUROLOGY

## 2017-03-04 PROCEDURE — 74011250637 HC RX REV CODE- 250/637: Performed by: INTERNAL MEDICINE

## 2017-03-04 PROCEDURE — 82803 BLOOD GASES ANY COMBINATION: CPT | Performed by: INTERNAL MEDICINE

## 2017-03-04 PROCEDURE — 65660000000 HC RM CCU STEPDOWN

## 2017-03-04 PROCEDURE — 85025 COMPLETE CBC W/AUTO DIFF WBC: CPT | Performed by: INTERNAL MEDICINE

## 2017-03-04 PROCEDURE — 36415 COLL VENOUS BLD VENIPUNCTURE: CPT | Performed by: INTERNAL MEDICINE

## 2017-03-04 PROCEDURE — 84439 ASSAY OF FREE THYROXINE: CPT | Performed by: INTERNAL MEDICINE

## 2017-03-04 PROCEDURE — 65270000029 HC RM PRIVATE

## 2017-03-04 RX ORDER — THIAMINE HYDROCHLORIDE 100 MG/ML
100 INJECTION, SOLUTION INTRAMUSCULAR; INTRAVENOUS DAILY
Status: DISCONTINUED | OUTPATIENT
Start: 2017-03-05 | End: 2017-03-04 | Stop reason: SDUPTHER

## 2017-03-04 RX ADMIN — TRIAMTERENE AND HYDROCHLOROTHIAZIDE 1 TABLET: 37.5; 25 TABLET ORAL at 08:51

## 2017-03-04 RX ADMIN — LEVOFLOXACIN 750 MG: 750 TABLET, FILM COATED ORAL at 22:33

## 2017-03-04 RX ADMIN — METOPROLOL SUCCINATE 25 MG: 25 TABLET, FILM COATED, EXTENDED RELEASE ORAL at 08:51

## 2017-03-04 RX ADMIN — IPRATROPIUM BROMIDE AND ALBUTEROL SULFATE 3 ML: .5; 2.5 SOLUTION RESPIRATORY (INHALATION) at 07:35

## 2017-03-04 RX ADMIN — HYDRALAZINE HYDROCHLORIDE 10 MG: 20 INJECTION INTRAMUSCULAR; INTRAVENOUS at 04:30

## 2017-03-04 RX ADMIN — ENOXAPARIN SODIUM 40 MG: 40 INJECTION SUBCUTANEOUS at 22:33

## 2017-03-04 RX ADMIN — THIAMINE HYDROCHLORIDE 100 MG: 100 INJECTION, SOLUTION INTRAMUSCULAR; INTRAVENOUS at 17:50

## 2017-03-04 RX ADMIN — ATORVASTATIN CALCIUM 10 MG: 10 TABLET, FILM COATED ORAL at 08:51

## 2017-03-04 RX ADMIN — IPRATROPIUM BROMIDE AND ALBUTEROL SULFATE 3 ML: .5; 2.5 SOLUTION RESPIRATORY (INHALATION) at 15:07

## 2017-03-04 RX ADMIN — IPRATROPIUM BROMIDE AND ALBUTEROL SULFATE 3 ML: .5; 2.5 SOLUTION RESPIRATORY (INHALATION) at 01:16

## 2017-03-04 RX ADMIN — Medication 10 ML: at 07:03

## 2017-03-04 RX ADMIN — LORATADINE 10 MG: 10 TABLET ORAL at 08:51

## 2017-03-04 RX ADMIN — IPRATROPIUM BROMIDE AND ALBUTEROL SULFATE 3 ML: .5; 2.5 SOLUTION RESPIRATORY (INHALATION) at 19:21

## 2017-03-04 RX ADMIN — ENOXAPARIN SODIUM 40 MG: 40 INJECTION SUBCUTANEOUS at 08:51

## 2017-03-04 NOTE — CONSULTS
[facility] [address]   [place] [address], [address]    [facility]   [phone] Fax         Dictated, thanks  Decreased responsiveness with multifocal myoclonus  abnml CT head  Thiamine  EEG today  MRI brain    TOÑA Diehl MD

## 2017-03-04 NOTE — CONSULTS
Name: [address]: [address]   : 1955 Admit Date: 3/2/2017   Phone: [phone]  Room: 326/01   PCP: Jenny Mosqueda MD  MRN: [id number]   Date: 3/4/2017  Code: Full Code        Overnight events:  Denies SOB nor cough. Is very slow in mentation and answers yes/no inappropriately. States she is using the IS, however when demonstrating how she does it, she is unable to. When asked if she has \"jerking movements\" at home (having myoclonus duing my exam).  She states \"sometimes\"    Past Medical History:   Diagnosis Date    Hypertension     RA (rheumatoid arthritis) ([address]        Past Surgical History:   Procedure Laterality Date    HX GYN      R vulvectomy       Family History   Problem Relation Age of Onset    Heart Disease Father     Hypertension Father     Stroke Father     Heart Disease Mother     Hypertension Mother     Hypertension Sister     Hypertension Brother        Social History   Substance Use Topics    Smoking status: Former Smoker    Smokeless tobacco: Not on file    Alcohol use Yes       Allergies   Allergen Reactions    Bee Sting [Sting, Bee] Anaphylaxis    Codeine Hives and Shortness of Breath     bp drop     Methotrexate Nausea and Vomiting    Penicillins Hives and Shortness of Breath     bp drop       Current Facility-Administered Medications   Medication Dose Route Frequency    metoprolol succinate (TOPROL-XL) XL tablet 25 mg  25 mg Oral DAILY    atorvastatin (LIPITOR) tablet 10 mg  10 mg Oral DAILY    levoFLOXacin (LEVAQUIN) tablet 750 mg  750 mg Oral Q24H    sodium chloride (NS) flush 5-10 mL  5-10 mL IntraVENous Q8H    sodium chloride (NS) flush 5-10 mL  5-10 mL IntraVENous PRN    acetaminophen (TYLENOL) tablet 650 mg  650 mg Oral Q4H PRN    naloxone (NARCAN) injection 0.4 mg  0.4 mg IntraVENous PRN    diphenhydrAMINE (BENADRYL) injection 12.5 mg  12.5 mg IntraVENous Q4H PRN    prochlorperazine (COMPAZINE) injection 5 mg  5 mg IntraVENous Q8H PRN    enoxaparin (LOVENOX) injection 40 mg  40 mg SubCUTAneous Q12H    albuterol-ipratropium (DUO-NEB) 2.5 MG-0.5 MG/3 ML  3 mL Nebulization Q6H RT    hydrALAZINE (APRESOLINE) 20 mg/mL injection 10 mg  10 mg IntraVENous Q6H PRN    ergocalciferol (ERGOCALCIFEROL) capsule 50,000 Units  50,000 Units Oral Q7D    loratadine (CLARITIN) tablet 10 mg  10 mg Oral DAILY    triamterene-hydroCHLOROthiazide (MAXZIDE) 37.5-25 mg per tablet 1 Tab  1 Tab Oral DAILY         REVIEW OF SYSTEMS   Negative except as stated in the HPI. Physical Exam:   Visit Vitals    /61    Pulse 86    Temp 98 °F (36.7 °C)    Resp 20    Ht 5' 6\" (1.676 m)    Wt 120 kg (264 lb 8.8 oz)    SpO2 93%    BMI 42.7 kg/m2   on 3L    General:  Slow mentation, delayed respons even though fully awake meeting eye contact   Head:  Normocephalic, without obvious abnormality, atraumatic. Eyes:  Conjunctivae/corneas clear. Proptosis. Nose: Nares normal. Septum midline. Mucosa normal.    Throat: Lips, mucosa, and tongue normal.  Class 4 airway. Neck: Supple, symmetrical, trachea midline, no adenopathy. Lungs:   Crackles in bases bilaterally   Chest wall:  No tenderness or deformity. Heart:  Regular rate and rhythm, S1, S2 normal, no murmur, click, rub or gallop. Abdomen:   Obese, soft, non-tender. Bowel sounds normal. No masses,  No organomegaly. Extremities: Extremities normal, atraumatic, no cyanosis, + 1 pitting edema. Pulses: 2+ and symmetric all extremities.    Skin: Skin color, texture, turgor normal. No rashes or lesions   Lymph nodes: Cervical, supraclavicular nodes normal.   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 phosphatase 98 03/03/2017 04:40 AM    Protein, total 6.9 03/03/2017 04:40 AM    Albumin 2.8 03/03/2017 04:40 AM    Globulin 4.1 03/03/2017 04:40 AM       No results found for: IRON, FE, TIBC, IBCT, PSAT, FERR    Lab Results   Component Value Date/Time    TSH 0.89 03/03/2017 09:25 AM        No results found for: PH, PHI, PCO2, PCO2I, PO2, PO2I, HCO3, HCO3I, FIO2, FIO2I    Lab Results   Component Value Date/Time    Troponin-I, Qt. 0.07 03/03/2017 09:25 AM        No results found for: CULT    No results found for: TOXA1, RPR, HBCM, HBSAG, HAAB, HCAB, HCAB1, HAAT, G6PD, CRYAC, HIVGT, HIVR, HIV1, HIV12, HIVPC, HIVRPI    No results found for: VANCT, CPK    No results found for: COLOR, APPRN, SPGRU, TABATHA, PROTU, GLUCU, KETU, BILU, BLDU, UROU, ASHKAN, LEUKU, WBCU, RBCU, UEPI, BACTU, CASTS, UCRY     Imaging: HRCT with improved atx in bases    IMPRESSION  · Hypoxia- likely secondary to atx  · Dyspnea- resolved  · Bibasilar atelectasis  · Confusion-   · Pericardial effusion  · HTN  · RA  · Former smoker    PLAN  · Appears to be some underlying neurologic process going on with myoclonus and delayed mentation- this seems worse than yesterday. May be metabolic.   · Neurology consult pending  · Wean O2 as able to keep sats > 90%  · IS and OOB as much as possible  · Duonebs  · ABG to rule out CO2 retention  · Check viral panel  · Check Ammonia, free T4, CBC, BMP  · May need head CT- will discuss with Namrata  · ECHO wnl  · BP control per primary team  · DVT prophylaxis: Rhett Osman MD

## 2017-03-04 NOTE — PROGRESS NOTES
1945 Bedside and Verbal shift change report given to Fiona Guy (oncoming nurse) by Noah5 MICHELLE Colunga (offgoing nurse). Report included the following information SBAR, Kardex and MAR. 2015 Receiving breathing treatment,  at bedside. No complaints voiced. 0530 Patient being assisted by PCT to a standing scale to weigh, noted to be drowsy and unsteady, weight obtained. Assisted back to the edge of bed x2 assist. Alert and oriented x3, pupils equal, round and  reacting to light, able to track with eyes. Facial symmetry normal, Hand grasp equal, both arm drift swiftly at the same time, smile is normal, speech is clear but delayed. Vital signs WDL  98.0 - 83-20- 126/70- O2 sat 92% on 4L. Patient given PRN hydralazine for /93 at 0430  which was effective and now 126/70, will continue to monitor. No obvious neuro concerns noted at this time. 0730 Oncoming nurse notified of above and will monitor closely for any changes. Bedside and Verbal shift change report given to Janna Javier (oncoming nurse) by Laura Land (offgoing nurse). Report included the following information SBAR, Kardex and MAR.

## 2017-03-04 NOTE — PROGRESS NOTES
[facility]  [address]  [phone]      Medical Progress Note      NAME: Genny Dougherty   :  1955  MRM:  [id number]    Date/Time: 3/4/2017  7:39 AM       Assessment and Plan:   1. Acute respiratory failure with hypoxia ([facility] [address] (3/2/2017): unclear etiology. Continue supplemental oxygen to keep SAO2 > 90%. CT scan: BL atelectasis. Pulmonology evaluation appreciated.     2. SOB (shortness of breath) (3/2/2017) (3/2/2017)/ Elevated troponin (3/2/2017): Echocardiogram: EF is 65%, no wall motion abnormality. Empiric IV levofloxacin. Evaluated by cardiology and no plan for inpatient evaluation. Outpatient FU      3. Rheumatoid arthritis involving multiple sites with positive rheumatoid factor ([facility] [address] (10/7/2016): Holding Orencia     4. HTN (hypertension), benign (3/2/2017): BP better controlled. On Maxide    5. Morbid obesity. Would benefit from wt loss. 6.  Acute encephalopathy. Pt is confused this morning, which is new. Check ABG. CT of the head. Consult neurology. Subjective:     Chief Complaint:  SOB is better     ROS:  (bold if positive, if negative)    SOB/FOX   Tolerating PT  Tolerating Diet        Objective:     Last 24hrs VS reviewed since prior progress note.  Most recent are:    Visit Vitals    /77 (BP 1 Location: Left arm, BP Patient Position: At rest)    Pulse 86    Temp 98 °F (36.7 °C)    Resp 20    Ht 5' 6\" (1.676 m)    Wt 120 kg (264 lb 8.8 oz)    SpO2 93%    BMI 42.7 kg/m2     SpO2 Readings from Last 6 Encounters:   17 93%   17 (!) 80%   10/28/16 98%   16 94%    O2 Flow Rate (L/min): 4 l/min       Intake/Output Summary (Last 24 hours) at 17 0936  Last data filed at 17 0604   Gross per 24 hour   Intake              100 ml   Output             1400 ml   Net            -1300 ml        Physical Exam:    Gen:  Well-developed, well-nourished, in no acute distress  HEENT:  Pink conjunctivae, PERRL, hearing intact to voice, moist mucous membranes  Neck:  Supple, without masses, thyroid non-tender  Resp:  No accessory muscle use, clear breath sounds without wheezes rales or rhonchi  Card:  No murmurs, normal S1, S2 without thrills, bruits or peripheral edema  Abd:  Soft, non-tender, non-distended, normoactive bowel sounds are present, no palpable organomegaly and no detectable hernias  Lymph:  No cervical or inguinal adenopathy  Musc:  No cyanosis or clubbing  Skin:  No rashes or ulcers, skin turgor is good  Neuro:  Cranial nerves are grossly intact, no focal motor weakness, follows commands appropriately  Psych:  Good insight, oriented to person, place and time, alert  __________________________________________________________________  Medications Reviewed: (see below)  Medications:     Current Facility-Administered Medications   Medication Dose Route Frequency    metoprolol succinate (TOPROL-XL) XL tablet 25 mg  25 mg Oral DAILY    atorvastatin (LIPITOR) tablet 10 mg  10 mg Oral DAILY    levoFLOXacin (LEVAQUIN) tablet 750 mg  750 mg Oral Q24H    sodium chloride (NS) flush 5-10 mL  5-10 mL IntraVENous Q8H    sodium chloride (NS) flush 5-10 mL  5-10 mL IntraVENous PRN    acetaminophen (TYLENOL) tablet 650 mg  650 mg Oral Q4H PRN    naloxone (NARCAN) injection 0.4 mg  0.4 mg IntraVENous PRN    diphenhydrAMINE (BENADRYL) injection 12.5 mg  12.5 mg IntraVENous Q4H PRN    prochlorperazine (COMPAZINE) injection 5 mg  5 mg IntraVENous Q8H PRN    enoxaparin (LOVENOX) injection 40 mg  40 mg SubCUTAneous Q12H    albuterol-ipratropium (DUO-NEB) 2.5 MG-0.5 MG/3 ML  3 mL Nebulization Q6H RT    hydrALAZINE (APRESOLINE) 20 mg/mL injection 10 mg  10 mg IntraVENous Q6H PRN    ergocalciferol (ERGOCALCIFEROL) capsule 50,000 Units  50,000 Units Oral Q7D    loratadine (CLARITIN) tablet 10 mg  10 mg Oral DAILY    triamterene-hydroCHLOROthiazide (MAXZIDE) 37.5-25 mg per tablet 1 Tab  1 Tab Oral DAILY        Lab Data Reviewed: (see below)  Lab Review:     Recent Labs      03/03/17   0440  03/02/17   1747   WBC  8.2  9.3   HGB  15.4  14.6   HCT  51.2*  48.0*   PLT  244  260     Recent Labs      03/03/17   0440  03/02/17   1747   NA  141  140   K  4.1  4.7   CL  100  99   CO2  38*  38*   GLU  100  94   BUN  16  17   CREA  0.72  0.64   CA  8.3*  8.4*   ALB  2.8*  2.9*   TBILI  0.3  0.5   SGOT  30  42*   ALT  63  70     No results found for: GLUCPOC  No results for input(s): PH, PCO2, PO2, HCO3, FIO2 in the last 72 hours. No results for input(s): INR in the last 72 hours. No lab exists for component: INREXT, INREXT  All Micro Results     Procedure Component Value Units Date/Time    RESPIRATORY VIRAL PANEL, PCR [[id number]]     Order Status:  Sent Specimen:  Sputum           I have reviewed notes of prior 24hr. Other pertinent lab:       Total time spent with patient: [address] discussed with: Patient, Nursing Staff and >50% of time spent in counseling and coordination of care    Discussed:  Care Plan    Prophylaxis:  Lovenox    Disposition:  Home w/Family           ___________________________________________________    Attending Physician: Kim Palafox MD

## 2017-03-04 NOTE — CONSULTS
[facility]   [address] [facility], [address]   [address] [facility]       Name:  Bryanna Alarcon   MR#:  [id number]   :  1955   Account #:  [de-identified]    Date of Consultation:  2017   Date of Adm:  2017       REQUESTING PHYSICIAN: Dr. Soctt Jolly. Thank you for asking us to see the patient in neurologic consultation   for acute change in her neurologic status. HISTORY OF PRESENT ILLNESS: She is a 79-year-old lady who is   unable to provide any history. As I understand it, she came into the   hospital on  with the complaint of increasing shortness of breath   even at rest, but worse with exertion. She has been brought in and   given supplemental oxygen. She has by her evaluation at this point felt   to have a pneumonia and bronchitis. She is being treated for that. In   any regard, this morning she appeared to be less responsive than   usual. She has been given again supplemental oxygen to keep her   SaO2 greater than 90%. She was noted by Cardiology to not have   any underlying cardiac issues that would be contributing. Pulmonary,   Dr. Homero Villalobos, saw her this morning and she was confused and unable to   follow commands appropriately. She was noted to have myoclonus. Her mentation is noted to be different than yesterday. She underwent a   head CT without contrast and this demonstrates scattered   hypodensities in the cerebral white matter. Again, no contrast was   given for this. She is presently on BiPAP mask. PAST MEDICAL HISTORY: Juan F Walsh just to be hypertension and   rheumatoid arthritis. ALLERGIES   1. BEE STING. 2. CODEINE. 3. METHOTREXATE. 4. PENICILLIN. PRESENT MEDICATIONS   As ordered are:   1. Nebs. 2. Lipitor. 3. Lovenox. 4. Vitamin D.   5. Levaquin. 6. Claritin. 7. Toprol. 8. Maxzide. 9. Typical p.r.n's.       SOCIAL HISTORY: She is a former smoker.  Said to drink alcohol, but I   do not have a clear documentation of how much that is. REVIEW OF SYSTEMS: She is unable. PHYSICAL EXAMINATION   GENERAL: She is lying in bed. An obese lady with BiPAP in place. She   appears to be comfortable. VITAL SIGNS: She has been afebrile with pulse of 77, blood   pressure last reading was 150/75 and she has been ranging typically in   the 120s to the 160s/60s to the 90s. Her  oxygen saturation was a bit   decreased with a couple of readings in the 80s, although primarily she   is in the low to mid 90s. HEENT: Sclerae anicteric. Oropharynx is not able to be inspected. EXTREMITIES: She has edema of the lower extremities and she does   have redness to the skin. Erythema, but no rubor of the skin of the distal   lower extremities. NEUROLOGIC: She will arouse to tactile and then will go right back   with the eyes closed and not responsive. With constant stimulation, I   can get her to squeeze her hands and wiggle her feet. She has   multifocal myoclonus. I asked her to get through extraocular motility   and she just stares at me. Again, marked decrease in her attention. She will eventually track. There is no overt facial asymmetry, but again   difficult with the BiPAP mask on. Will not protrude the tongue. I cannot   get her to make any vocalization, she is not attempting to communicate   and has again, the decrease command following as stated. She has   symmetric squeeze as stated. She withdraws in all 4 extremities. Reflexes globally depressed and her toes are mute. Remainder not   testable. CT scan of the head as stated. Vital signs as stated. LABORATORY DATA: Laboratory analyses find a normal ammonia. Her CBC is with a hemoglobin of 14.5 and no significant white count. Her free T4 is normal. Blood gas 7.33, 85, 58, 87% and that was at   10:41 this morning. Her electrolytes last drawn yesterday fine no   significant abnormality.     IMPRESSION: A 70-year-old lady with decreasing   responsiveness, abnormal CT scan of the head and multifocal   myoclonic jerks. Given this, the myoclonus is nonspecific and that this   can be seen in metabolic derangement, etc.,  however, in the context   of her decreased responsiveness and the abnormal head CT,   we certainly need to consider ictus in the differential. I am going to go   ahead and order an MRI of the brain, given the abnormal CT scan. We   will get an EEG done today. Give her some thiamine. We will defer an   antiepileptic drug at this point. We will follow and make further   recommendations. Thanks for your request for consultation.         MD SANKET Cowart / Nereida.Mary   D:  03/04/2017   15:49   T:  03/04/2017   17:21   Job #:  [id number]

## 2017-03-04 NOTE — PROGRESS NOTES
Assumed care of patient. Patient made stepdown for continuous bipap need. Patient is off the floor for testing at this time. Bipap at the bedside.

## 2017-03-04 NOTE — PROGRESS NOTES
Bedside and Verbal shift change report given to [address] [place] [address] Pkwy (oncoming nurse) by Ita Hyatt (offgoing nurse). Report included the following information SBAR, Kardex, MAR, Recent Results and Cardiac Rhythm NSR. Patient presents with some delayed responses. MD in to see patient. Neuro exam + for decreased sensation in LUE and LLE. And ataxia in LUE. Patient does have RA in left arm. PERRLA. Patient having spasms generalized says that happens at home also though. BP rechecked and down a little 140's. Lungs very diminished. Scheduled neb given to patient. Patient weaned to 3L per MD. Oxygen at 90%    New orders for CT scan, Neuro consult, ABGs. NIH scale obtained to get a baseline. New labs entered by pulmonary collected and sent to lab. Patient oxygen at 87% oxygen increased back to 4L. Transport here to get patient for CT. Patient unable to get off toilet. Patient short of breath and weak. Unable to get to wheelchair by self. Patient seemed lost to surrounding. CO2 elevated on abgs new orders to place patient on continuous biPap.

## 2017-03-05 ENCOUNTER — APPOINTMENT (OUTPATIENT)
Dept: MRI IMAGING | Age: 62
DRG: 189 | End: 2017-03-05
Attending: PSYCHIATRY & NEUROLOGY
Payer: COMMERCIAL

## 2017-03-05 LAB
ARTERIAL PATENCY WRIST A: YES
BASE EXCESS BLDA CALC-SCNC: 16.2 MMOL/L
BDY SITE: ABNORMAL
GAS FLOW.O2 O2 DELIVERY SYS: 6 L/MIN
HCO3 BLDA-SCNC: 45 MMOL/L (ref 22–26)
PCO2 BLDA: 70 MMHG (ref 35–45)
PH BLDA: 7.42 [PH] (ref 7.35–7.45)
PO2 BLDA: 63 MMHG (ref 80–100)
SAO2 % BLD: 92 % (ref 92–97)
SAO2% DEVICE SAO2% SENSOR NAME: ABNORMAL
SPECIMEN SITE: ABNORMAL

## 2017-03-05 PROCEDURE — 94640 AIRWAY INHALATION TREATMENT: CPT

## 2017-03-05 PROCEDURE — 70553 MRI BRAIN STEM W/O & W/DYE: CPT

## 2017-03-05 PROCEDURE — 74011250637 HC RX REV CODE- 250/637: Performed by: INTERNAL MEDICINE

## 2017-03-05 PROCEDURE — 74011250636 HC RX REV CODE- 250/636: Performed by: PSYCHIATRY & NEUROLOGY

## 2017-03-05 PROCEDURE — 82803 BLOOD GASES ANY COMBINATION: CPT | Performed by: INTERNAL MEDICINE

## 2017-03-05 PROCEDURE — 65660000000 HC RM CCU STEPDOWN

## 2017-03-05 PROCEDURE — 74011000258 HC RX REV CODE- 258: Performed by: PSYCHIATRY & NEUROLOGY

## 2017-03-05 PROCEDURE — 74011250636 HC RX REV CODE- 250/636: Performed by: INTERNAL MEDICINE

## 2017-03-05 PROCEDURE — 77010033678 HC OXYGEN DAILY

## 2017-03-05 PROCEDURE — 74011000250 HC RX REV CODE- 250: Performed by: INTERNAL MEDICINE

## 2017-03-05 PROCEDURE — 36600 WITHDRAWAL OF ARTERIAL BLOOD: CPT | Performed by: INTERNAL MEDICINE

## 2017-03-05 PROCEDURE — A9585 GADOBUTROL INJECTION: HCPCS | Performed by: INTERNAL MEDICINE

## 2017-03-05 RX ADMIN — Medication 10 ML: at 22:35

## 2017-03-05 RX ADMIN — LORATADINE 10 MG: 10 TABLET ORAL at 08:33

## 2017-03-05 RX ADMIN — THIAMINE HYDROCHLORIDE 100 MG: 100 INJECTION, SOLUTION INTRAMUSCULAR; INTRAVENOUS at 12:30

## 2017-03-05 RX ADMIN — IPRATROPIUM BROMIDE AND ALBUTEROL SULFATE 3 ML: .5; 2.5 SOLUTION RESPIRATORY (INHALATION) at 13:31

## 2017-03-05 RX ADMIN — ATORVASTATIN CALCIUM 10 MG: 10 TABLET, FILM COATED ORAL at 08:33

## 2017-03-05 RX ADMIN — METOPROLOL SUCCINATE 25 MG: 25 TABLET, FILM COATED, EXTENDED RELEASE ORAL at 08:33

## 2017-03-05 RX ADMIN — IPRATROPIUM BROMIDE AND ALBUTEROL SULFATE 3 ML: .5; 2.5 SOLUTION RESPIRATORY (INHALATION) at 19:32

## 2017-03-05 RX ADMIN — ENOXAPARIN SODIUM 40 MG: 40 INJECTION SUBCUTANEOUS at 22:34

## 2017-03-05 RX ADMIN — LEVOFLOXACIN 750 MG: 750 TABLET, FILM COATED ORAL at 22:32

## 2017-03-05 RX ADMIN — ENOXAPARIN SODIUM 40 MG: 40 INJECTION SUBCUTANEOUS at 08:33

## 2017-03-05 RX ADMIN — IPRATROPIUM BROMIDE AND ALBUTEROL SULFATE 3 ML: .5; 2.5 SOLUTION RESPIRATORY (INHALATION) at 01:29

## 2017-03-05 RX ADMIN — Medication 10 ML: at 07:27

## 2017-03-05 RX ADMIN — GADOBUTROL 12 ML: 604.72 INJECTION INTRAVENOUS at 11:01

## 2017-03-05 RX ADMIN — TRIAMTERENE AND HYDROCHLOROTHIAZIDE 1 TABLET: 37.5; 25 TABLET ORAL at 08:33

## 2017-03-05 RX ADMIN — IPRATROPIUM BROMIDE AND ALBUTEROL SULFATE 3 ML: .5; 2.5 SOLUTION RESPIRATORY (INHALATION) at 07:18

## 2017-03-05 NOTE — PROGRESS NOTES
Bedside and Verbal shift change report given to Kadie Segovia RN (oncoming nurse) by Tyrone Quezada RN (offgoing nurse). Report included the following information SBAR, Procedure Summary, Intake/Output, MAR and Cardiac Rhythm NSR.

## 2017-03-05 NOTE — PROGRESS NOTES
[facility] [address]   [place] [address]   Raul Lemus    [facility]   [phone] Fax         Encephalopathy    Chart reviewed and said to be more alert this am after BiPap yesterday  MRI reviewed and no evidence for CVA, mass, etc--chronic SVWM dz  EEG reviewed and diffuse slowing with triphasics--no epileptiform     and friend at bedside  Visit Vitals    /67 (BP 1 Location: Left arm, BP Patient Position: At rest)    Pulse 72    Temp 98 °F (36.7 °C)    Resp 24    Ht 5' 6\" (1.676 m)    Wt 117.9 kg (260 lb)    LMP 02/23/2013    SpO2 91%    BMI 41.97 kg/m2       She is up in chair  Oriented  Laughs  No focality    Imp/plan  Encephalopathy secondary to her underlying respiratory issues  No evidence for primary Neuro issue which is good  Offered reassurance  We will return PRN  Thanks    TOÑA Diehl MD

## 2017-03-05 NOTE — PROGRESS NOTES
Bedside and Verbal shift change report given to Dale Vega (oncoming nurse) by Iqra Camacho RN (offgoing nurse). Report included the following information SBAR, Procedure Summary, Intake/Output, MAR and Cardiac Rhythm NSR.

## 2017-03-05 NOTE — PROGRESS NOTES
Chapo Dominguez [facility] [address]  [address], [place], [address] [facility]  [phone]      Medical Progress Note      NAME: Bladimir Lainez   :  1955  MRM:  [id number]    Date/Time: 3/5/2017  7:39 AM       Assessment and Plan:   1. Acute respiratory failure with hypoxia/ hypercarbia.  ([facility] [address] (3/2/2017): Continue supplemental oxygen to keep SAO2 > 90%. CT scan: BL atelectasis. Continue IS. Pulmonology evaluation appreciated.     2. Acute encephalopathy. Pt is more alert this morning. Neurology evaluation appreciated. CT of the head without acute event. Check MRI and EEG. This morning mentation is better after pt slept using BiPAP. Check ABG. Ammonia level is normal.     3.  SOB (shortness of breath) (3/2/2017) (3/2/2017)/ Elevated troponin (3/2/2017): Echocardiogram: EF is 65%, no wall motion abnormality. Empiric IV levofloxacin. Evaluated by cardiology and plan for cardiac workup at some point. Continue BiPAP as needed.       4. Rheumatoid arthritis involving multiple sites with positive rheumatoid factor ([facility] (10/7/2016): Holding Orencia     5. HTN (hypertension), benign (3/2/2017): BP better controlled. On Maxide    6. Morbid obesity. Would benefit from wt loss. Subjective:     Chief Complaint:  Confusion is better    ROS:  (bold if positive, if negative)    SOB/FOX   Tolerating PT  Tolerating Diet        Objective:     Last 24hrs VS reviewed since prior progress note.  Most recent are:    Visit Vitals    /63 (BP 1 Location: Left arm, BP Patient Position: At rest)    Pulse 70    Temp 98 °F (36.7 °C)    Resp 15    Ht 5' 6\" (1.676 m)    Wt 118.1 kg (260 lb 6.4 oz)    SpO2 94%    BMI 42.03 kg/m2     SpO2 Readings from Last 6 Encounters:   17 94%   17 (!) 80%   10/28/16 98%   16 94%    O2 Flow Rate (L/min): 4 l/min       Intake/Output Summary (Last 24 hours) at 17 0816  Last data filed at 17 1858   Gross per 24 hour   Intake 240 ml   Output              350 ml   Net             -110 ml        Physical Exam:    Gen:  Well-developed, well-nourished, in no acute distress  HEENT:  Pink conjunctivae, PERRL, hearing intact to voice, moist mucous membranes  Neck:  Supple, without masses, thyroid non-tender  Resp:  No accessory muscle use, clear breath sounds without wheezes rales or rhonchi  Card:  No murmurs, normal S1, S2 without thrills, bruits or peripheral edema  Abd:  Soft, non-tender, non-distended, normoactive bowel sounds are present, no palpable organomegaly and no detectable hernias  Lymph:  No cervical or inguinal adenopathy  Musc:  No cyanosis or clubbing  Skin:  No rashes or ulcers, skin turgor is good  Neuro:  Cranial nerves are grossly intact, no focal motor weakness, follows commands appropriately  Psych:  Good insight, oriented to person, place and time, alert  __________________________________________________________________  Medications Reviewed: (see below)  Medications:     Current Facility-Administered Medications   Medication Dose Route Frequency    thiamine (B-1) 100 mg in 0.9% sodium chloride 50 mL IVPB  100 mg IntraVENous DAILY    metoprolol succinate (TOPROL-XL) XL tablet 25 mg  25 mg Oral DAILY    atorvastatin (LIPITOR) tablet 10 mg  10 mg Oral DAILY    levoFLOXacin (LEVAQUIN) tablet 750 mg  750 mg Oral Q24H    sodium chloride (NS) flush 5-10 mL  5-10 mL IntraVENous Q8H    sodium chloride (NS) flush 5-10 mL  5-10 mL IntraVENous PRN    acetaminophen (TYLENOL) tablet 650 mg  650 mg Oral Q4H PRN    naloxone (NARCAN) injection 0.4 mg  0.4 mg IntraVENous PRN    diphenhydrAMINE (BENADRYL) injection 12.5 mg  12.5 mg IntraVENous Q4H PRN    prochlorperazine (COMPAZINE) injection 5 mg  5 mg IntraVENous Q8H PRN    enoxaparin (LOVENOX) injection 40 mg  40 mg SubCUTAneous Q12H    albuterol-ipratropium (DUO-NEB) 2.5 MG-0.5 MG/3 ML  3 mL Nebulization Q6H RT    hydrALAZINE (APRESOLINE) 20 mg/mL injection 10 mg  10 mg IntraVENous Q6H PRN    ergocalciferol (ERGOCALCIFEROL) capsule 50,000 Units  50,000 Units Oral Q7D    loratadine (CLARITIN) tablet 10 mg  10 mg Oral DAILY    triamterene-hydroCHLOROthiazide (MAXZIDE) 37.5-25 mg per tablet 1 Tab  1 Tab Oral DAILY        Lab Data Reviewed: (see below)  Lab Review:     Recent Labs      03/04/17   1100  03/03/17   0440  03/02/17   1747   WBC  10.4  8.2  9.3   HGB  14.5  15.4  14.6   HCT  47.7*  51.2*  48.0*   PLT  237  244  260     Recent Labs      03/03/17   0440  03/02/17   1747   NA  141  140   K  4.1  4.7   CL  100  99   CO2  38*  38*   GLU  100  94   BUN  16  17   CREA  0.72  0.64   CA  8.3*  8.4*   ALB  2.8*  2.9*   TBILI  0.3  0.5   SGOT  30  42*   ALT  63  70     No results found for: GLUCPOC  Recent Labs      03/04/17   1032   PH  7.33*   PCO2  85*   PO2  58*   HCO3  44*     No results for input(s): INR in the last 72 hours. No lab exists for component: INREXT, INREXT  All Micro Results     Procedure Component Value Units Date/Time    RESPIRATORY VIRAL PANEL, PCR [[id number]] Collected:  03/03/17 0945    Order Status:  Canceled Specimen:  Sputum           I have reviewed notes of prior 24hr. Other pertinent lab:       Total time spent with patient: [address] discussed with: Patient, Nursing Staff and >50% of time spent in counseling and coordination of care    Discussed:  Care Plan    Prophylaxis:  Lovenox    Disposition:  Home w/Family           ___________________________________________________    Attending Physician: Doc Alston MD

## 2017-03-05 NOTE — PROGRESS NOTES
Pt discussed with Dr. Svetlana Boyle. Currently in MRI and unable to see. ABG with chronic CO2 retention. Needs sleep study as outpt and PFTs. May be able to be discharged on bipap based on chronic CO2 retention. Per Dr. Svetlana Boyle mental status much improved today. Echo with no pulmHTN, but if JATINDER is severe, this is a concern. May need right heart cath as well. Will follow up again on Monday.

## 2017-03-05 NOTE — PROGRESS NOTES
1930 Bedside and Verbal shift change report given to Che Contreras (oncoming nurse) by Brianna Gilbert (offgoing nurse). Report included the following information SBAR, Kardex and MAR.   1:1 Sitter with patient,  at bed side. Patient making attempts to get out of bed without help and removing Bipap mask,  encouraging patient to remain in bed and keep bipap on, easy to redirect. 2025 Patient is calm,  off the unit, sitter at bedside. 0600  Patient noted with occasional behavioral issues, noted repeatedly getting out bed, but easily to redirect. Bipap on throughout night. 0720 Bedside and Verbal shift change report given to [address] [place] [address] (oncoming nurse) by Estrella Almendarez (offgoing nurse). Report included the following information SBAR, Kardex and MAR.

## 2017-03-06 LAB
ALBUMIN SERPL BCP-MCNC: 2.4 G/DL (ref 3.5–5)
ALBUMIN/GLOB SERPL: 0.6 {RATIO} (ref 1.1–2.2)
ALP SERPL-CCNC: 75 U/L (ref 45–117)
ALT SERPL-CCNC: 34 U/L (ref 12–78)
ANION GAP BLD CALC-SCNC: 2 MMOL/L (ref 5–15)
ARTERIAL PATENCY WRIST A: YES
AST SERPL W P-5'-P-CCNC: 14 U/L (ref 15–37)
B PERT DNA SPEC QL NAA+PROBE: NOT DETECTED
BASE EXCESS BLDA CALC-SCNC: 13.2 MMOL/L
BASOPHILS # BLD AUTO: 0 K/UL (ref 0–0.1)
BASOPHILS # BLD: 0 % (ref 0–1)
BDY SITE: ABNORMAL
BILIRUB DIRECT SERPL-MCNC: 0.1 MG/DL (ref 0–0.2)
BILIRUB SERPL-MCNC: 0.7 MG/DL (ref 0.2–1)
BUN SERPL-MCNC: 20 MG/DL (ref 6–20)
BUN/CREAT SERPL: 25 (ref 12–20)
C PNEUM DNA SPEC QL NAA+PROBE: NOT DETECTED
CALCIUM SERPL-MCNC: 8.2 MG/DL (ref 8.5–10.1)
CHLORIDE SERPL-SCNC: 101 MMOL/L (ref 97–108)
CO2 SERPL-SCNC: 40 MMOL/L (ref 21–32)
CREAT SERPL-MCNC: 0.79 MG/DL (ref 0.55–1.02)
EOSINOPHIL # BLD: 0.2 K/UL (ref 0–0.4)
EOSINOPHIL NFR BLD: 3 % (ref 0–7)
ERYTHROCYTE [DISTWIDTH] IN BLOOD BY AUTOMATED COUNT: 15.3 % (ref 11.5–14.5)
FLUAV H1 2009 PAND RNA SPEC QL NAA+PROBE: NOT DETECTED
FLUAV H1 RNA SPEC QL NAA+PROBE: NOT DETECTED
FLUAV H3 RNA SPEC QL NAA+PROBE: NOT DETECTED
FLUAV SUBTYP SPEC NAA+PROBE: NOT DETECTED
FLUBV RNA SPEC QL NAA+PROBE: NOT DETECTED
GAS FLOW.O2 O2 DELIVERY SYS: 4 L/MIN
GLOBULIN SER CALC-MCNC: 3.7 G/DL (ref 2–4)
GLUCOSE SERPL-MCNC: 94 MG/DL (ref 65–100)
HADV DNA SPEC QL NAA+PROBE: NOT DETECTED
HCO3 BLDA-SCNC: 42 MMOL/L (ref 22–26)
HCOV 229E RNA SPEC QL NAA+PROBE: NOT DETECTED
HCOV HKU1 RNA SPEC QL NAA+PROBE: NOT DETECTED
HCOV NL63 RNA SPEC QL NAA+PROBE: NOT DETECTED
HCOV OC43 RNA SPEC QL NAA+PROBE: NOT DETECTED
HCT VFR BLD AUTO: 46.2 % (ref 35–47)
HGB BLD-MCNC: 13.7 G/DL (ref 11.5–16)
HMPV RNA SPEC QL NAA+PROBE: NOT DETECTED
HPIV1 RNA SPEC QL NAA+PROBE: NOT DETECTED
HPIV2 RNA SPEC QL NAA+PROBE: NOT DETECTED
HPIV3 RNA SPEC QL NAA+PROBE: NOT DETECTED
HPIV4 RNA SPEC QL NAA+PROBE: NOT DETECTED
LYMPHOCYTES # BLD AUTO: 31 % (ref 12–49)
LYMPHOCYTES # BLD: 2.4 K/UL (ref 0.8–3.5)
M PNEUMO DNA SPEC QL NAA+PROBE: NOT DETECTED
MCH RBC QN AUTO: 28.1 PG (ref 26–34)
MCHC RBC AUTO-ENTMCNC: 29.7 G/DL (ref 30–36.5)
MCV RBC AUTO: 94.7 FL (ref 80–99)
MONOCYTES # BLD: 0.8 K/UL (ref 0–1)
MONOCYTES NFR BLD AUTO: 10 % (ref 5–13)
NEUTS SEG # BLD: 4.3 K/UL (ref 1.8–8)
NEUTS SEG NFR BLD AUTO: 56 % (ref 32–75)
PCO2 BLDA: 69 MMHG (ref 35–45)
PH BLDA: 7.4 [PH] (ref 7.35–7.45)
PLATELET # BLD AUTO: 238 K/UL (ref 150–400)
PO2 BLDA: 61 MMHG (ref 80–100)
POTASSIUM SERPL-SCNC: 3.5 MMOL/L (ref 3.5–5.1)
PROT SERPL-MCNC: 6.1 G/DL (ref 6.4–8.2)
RBC # BLD AUTO: 4.88 M/UL (ref 3.8–5.2)
RSV RNA SPEC QL NAA+PROBE: NOT DETECTED
RV+EV RNA SPEC QL NAA+PROBE: NOT DETECTED
SAO2 % BLD: 90 % (ref 92–97)
SAO2% DEVICE SAO2% SENSOR NAME: ABNORMAL
SODIUM SERPL-SCNC: 143 MMOL/L (ref 136–145)
SPECIMEN SITE: ABNORMAL
WBC # BLD AUTO: 7.6 K/UL (ref 3.6–11)

## 2017-03-06 PROCEDURE — 80048 BASIC METABOLIC PNL TOTAL CA: CPT | Performed by: INTERNAL MEDICINE

## 2017-03-06 PROCEDURE — 94060 EVALUATION OF WHEEZING: CPT

## 2017-03-06 PROCEDURE — 74011250636 HC RX REV CODE- 250/636: Performed by: INTERNAL MEDICINE

## 2017-03-06 PROCEDURE — 74011250637 HC RX REV CODE- 250/637: Performed by: INTERNAL MEDICINE

## 2017-03-06 PROCEDURE — 74011000250 HC RX REV CODE- 250: Performed by: INTERNAL MEDICINE

## 2017-03-06 PROCEDURE — 94726 PLETHYSMOGRAPHY LUNG VOLUMES: CPT

## 2017-03-06 PROCEDURE — 80076 HEPATIC FUNCTION PANEL: CPT | Performed by: INTERNAL MEDICINE

## 2017-03-06 PROCEDURE — 74011250636 HC RX REV CODE- 250/636: Performed by: PSYCHIATRY & NEUROLOGY

## 2017-03-06 PROCEDURE — 65660000000 HC RM CCU STEPDOWN

## 2017-03-06 PROCEDURE — 94729 DIFFUSING CAPACITY: CPT

## 2017-03-06 PROCEDURE — 74011000258 HC RX REV CODE- 258: Performed by: PSYCHIATRY & NEUROLOGY

## 2017-03-06 PROCEDURE — 87798 DETECT AGENT NOS DNA AMP: CPT | Performed by: PHYSICIAN ASSISTANT

## 2017-03-06 PROCEDURE — 36415 COLL VENOUS BLD VENIPUNCTURE: CPT | Performed by: INTERNAL MEDICINE

## 2017-03-06 PROCEDURE — 94640 AIRWAY INHALATION TREATMENT: CPT

## 2017-03-06 PROCEDURE — 36600 WITHDRAWAL OF ARTERIAL BLOOD: CPT | Performed by: PHYSICIAN ASSISTANT

## 2017-03-06 PROCEDURE — 77010033678 HC OXYGEN DAILY

## 2017-03-06 PROCEDURE — 85025 COMPLETE CBC W/AUTO DIFF WBC: CPT | Performed by: INTERNAL MEDICINE

## 2017-03-06 PROCEDURE — 94761 N-INVAS EAR/PLS OXIMETRY MLT: CPT

## 2017-03-06 PROCEDURE — 82803 BLOOD GASES ANY COMBINATION: CPT | Performed by: PHYSICIAN ASSISTANT

## 2017-03-06 PROCEDURE — 94660 CPAP INITIATION&MGMT: CPT

## 2017-03-06 RX ORDER — ALBUTEROL SULFATE 0.83 MG/ML
2.5 SOLUTION RESPIRATORY (INHALATION)
Status: COMPLETED | OUTPATIENT
Start: 2017-03-06 | End: 2017-03-06

## 2017-03-06 RX ADMIN — THIAMINE HYDROCHLORIDE 100 MG: 100 INJECTION, SOLUTION INTRAMUSCULAR; INTRAVENOUS at 09:13

## 2017-03-06 RX ADMIN — LORATADINE 10 MG: 10 TABLET ORAL at 09:12

## 2017-03-06 RX ADMIN — IPRATROPIUM BROMIDE AND ALBUTEROL SULFATE 3 ML: .5; 2.5 SOLUTION RESPIRATORY (INHALATION) at 01:06

## 2017-03-06 RX ADMIN — ALBUTEROL SULFATE 2.5 MG: 2.5 SOLUTION RESPIRATORY (INHALATION) at 12:48

## 2017-03-06 RX ADMIN — ATORVASTATIN CALCIUM 10 MG: 10 TABLET, FILM COATED ORAL at 09:12

## 2017-03-06 RX ADMIN — IPRATROPIUM BROMIDE AND ALBUTEROL SULFATE 3 ML: .5; 2.5 SOLUTION RESPIRATORY (INHALATION) at 07:04

## 2017-03-06 RX ADMIN — LEVOFLOXACIN 750 MG: 750 TABLET, FILM COATED ORAL at 22:13

## 2017-03-06 RX ADMIN — ENOXAPARIN SODIUM 40 MG: 40 INJECTION SUBCUTANEOUS at 09:11

## 2017-03-06 RX ADMIN — METOPROLOL SUCCINATE 25 MG: 25 TABLET, FILM COATED, EXTENDED RELEASE ORAL at 09:12

## 2017-03-06 RX ADMIN — Medication 10 ML: at 22:14

## 2017-03-06 RX ADMIN — IPRATROPIUM BROMIDE AND ALBUTEROL SULFATE 3 ML: .5; 2.5 SOLUTION RESPIRATORY (INHALATION) at 20:14

## 2017-03-06 RX ADMIN — TRIAMTERENE AND HYDROCHLOROTHIAZIDE 1 TABLET: 37.5; 25 TABLET ORAL at 09:12

## 2017-03-06 RX ADMIN — ENOXAPARIN SODIUM 40 MG: 40 INJECTION SUBCUTANEOUS at 22:13

## 2017-03-06 NOTE — PROGRESS NOTES
Name: [address]: [address]   : 1955 Admit Date: 3/2/2017   Phone: [phone]  Room: [facility]/01   PCP: Liam Arriaga MD  MRN: [id number]   Date: 3/6/2017  Code: Full Code          Chart and notes reviewed. Data reviewed. I review the patient's current medications in the medical record at each encounter. I have evaluated and examined the patient. Overnight events  Afebrile  Sats 89% on 4L  Slept with BiPAP overnight, but reports difficulty tolerating it  EEG consistent with encephalopathy    ECHO: EF 65%; no pulm HTN noted  MRI unremarkable    ROS: Reports feeling better this morning. Denies SOB or CP. Has occasional cough which is nonproductive. Denies fever or chills. Denies abd pain. Reports mild ankle/pedal edema. Had a hard time tolerating BiPAP pressure and mask, but nursing reports she slept most of the night with it.       Current Facility-Administered Medications   Medication Dose Route Frequency    thiamine (B-1) 100 mg in 0.9% sodium chloride 50 mL IVPB  100 mg IntraVENous DAILY    metoprolol succinate (TOPROL-XL) XL tablet 25 mg  25 mg Oral DAILY    atorvastatin (LIPITOR) tablet 10 mg  10 mg Oral DAILY    levoFLOXacin (LEVAQUIN) tablet 750 mg  750 mg Oral Q24H    sodium chloride (NS) flush 5-10 mL  5-10 mL IntraVENous Q8H    sodium chloride (NS) flush 5-10 mL  5-10 mL IntraVENous PRN    acetaminophen (TYLENOL) tablet 650 mg  650 mg Oral Q4H PRN    naloxone (NARCAN) injection 0.4 mg  0.4 mg IntraVENous PRN    diphenhydrAMINE (BENADRYL) injection 12.5 mg  12.5 mg IntraVENous Q4H PRN    prochlorperazine (COMPAZINE) injection 5 mg  5 mg IntraVENous Q8H PRN    enoxaparin (LOVENOX) injection 40 mg  40 mg SubCUTAneous Q12H    albuterol-ipratropium (DUO-NEB) 2.5 MG-0.5 MG/3 ML  3 mL Nebulization Q6H RT    hydrALAZINE (APRESOLINE) 20 mg/mL injection 10 mg  10 mg IntraVENous Q6H PRN    ergocalciferol (ERGOCALCIFEROL) capsule 50,000 Units  50,000 Units Oral Q7D    loratadine (CLARITIN) tablet 10 mg  10 mg Oral DAILY    triamterene-hydroCHLOROthiazide (MAXZIDE) 37.5-25 mg per tablet 1 Tab  1 Tab Oral DAILY         REVIEW OF SYSTEMS   Negative except as stated in the HPI. Physical Exam:   Visit Vitals    /64    Pulse 79    Temp 98.5 °F (36.9 °C)    Resp 15    Ht 5' 6\" (1.676 m)    Wt 119.9 kg (264 lb 4.8 oz)    SpO2 94%    BMI 42.66 kg/m2       General:  Alert, cooperative, no distress, appears stated age. Head:  Normocephalic, without obvious abnormality, atraumatic. Eyes:  Conjunctivae/corneas clear. Nose: Nares normal. Septum midline. Mucosa normal.    Throat: Lips, mucosa, and tongue normal.  Class 4 airway. Neck: Supple, symmetrical, trachea midline, no adenopathy. Lungs:   Mild crackles in the bases bilaterally. No wheeze or rhonchi. No increased WOB or accessory muscle use. Chest wall:  No tenderness or deformity. Heart:  Regular rate and rhythm, S1, S2 normal, no murmur, click, rub or gallop. Abdomen:   Obese, soft, non-tender. Bowel sounds normal. No masses,  No organomegaly. Extremities: Extremities normal, atraumatic, no cyanosis, + peripheral edema. Pulses: 2+ and symmetric all extremities.    Skin: Skin color, texture, turgor normal. No rashes or lesions   Lymph nodes: Cervical, supraclavicular nodes normal.   Neurologic: Grossly nonfocal, mentation at baseline, no myoclonus noted during my exam       Lab Results   Component Value Date/Time    Sodium 143 03/06/2017 05:07 AM    Potassium 3.5 03/06/2017 05:07 AM    Chloride 101 03/06/2017 05:07 AM    CO2 40 03/06/2017 05:07 AM    BUN 20 03/06/2017 05:07 AM    Creatinine 0.79 03/06/2017 05:07 AM    Glucose 94 03/06/2017 05:07 AM    Calcium 8.2 03/06/2017 05:07 AM       Lab Results   Component Value Date/Time    WBC 7.6 03/06/2017 05:07 AM    HGB 13.7 03/06/2017 05:07 AM    PLATELET 358 07/50/8344 05:07 AM    MCV 94.7 03/06/2017 05:07 AM       Lab Results Component Value Date/Time    AST (SGOT) 14 03/06/2017 05:07 AM    Alk.  phosphatase 75 03/06/2017 05:07 AM    Protein, total 6.1 03/06/2017 05:07 AM    Albumin 2.4 03/06/2017 05:07 AM    Globulin 3.7 03/06/2017 05:07 AM       No results found for: IRON, FE, TIBC, IBCT, PSAT, FERR    Lab Results   Component Value Date/Time    TSH 0.89 03/03/2017 09:25 AM        Lab Results   Component Value Date/Time    PH 7.42 03/05/2017 09:17 AM    PCO2 70 (H) 03/05/2017 09:17 AM    PO2 63 (L) 03/05/2017 09:17 AM    HCO3 45 (H) 03/05/2017 09:17 AM       Lab Results   Component Value Date/Time    Troponin-I, Qt. 0.07 03/03/2017 09:25 AM        No results found for: CULT    No results found for: TOXA1, RPR, HBCM, HBSAG, HAAB, HCAB, HCAB1, HAAT, G6PD, CRYAC, HIVGT, HIVR, HIV1, HIV12, HIVPC, HIVRPI    No results found for: VANCT, CPK    No results found for: COLOR, APPRN, SPGRU, TABATHA, PROTU, GLUCU, KETU, BILU, BLDU, UROU, ASHKAN, LEUKU, WBCU, RBCU, UEPI, BACTU, CASTS, UCRY      Images: no new images this morning    IMPRESSION  · Acute hypoxic respiratory failure: secondary to atx  · Hypercapnia/chronic CO2 retention: may have underlying sleep apnea  · Encephalopathy: improved; ammonia normal.  · Dyspnea: improved  · HTN  · RA; no evidence to suggest ILD on chest CT  · Former smoker    PLAN  · Wean O2 as able to keep sats > 90%  · Check 6 MWT  · Repeat ABG; may need BiPAP ay discharge  · Duonebs  · Continue Levaquin  · Viral panel pending  · Cardiology following; outpt stress test planned; ASA and statin initiated  · BP control per primary team/cardiology  · CM consult for trilogy outpatient  · Patient need outpatient sleep study and complete PFTs  · DVT prophylaxis: Lovenox        Leon Sos, [address]

## 2017-03-06 NOTE — PROGRESS NOTES
Chapo Dominguez [facility]  [address] [facility], [address] [place] [address]  [phone]      Medical Progress Note      NAME: Olivia Márquez   :  1955  MRM:  [id number]    Date/Time: 3/6/2017  7:39 AM       Assessment and Plan:   1. Acute respiratory failure with hypoxia/ hypercarbia.  ([facility] (3/2/2017): Continue supplemental oxygen to keep SAO2 > 90%. CT scan: BL atelectasis. Continue IS. Pulmonology evaluation appreciated. Needs outpatient sleep study, PFT. May need home BiPAP     2. Acute encephalopathy. Likely secondary to CO2 retention. Pt is more alert this morning. Neurology evaluation appreciated. CT of the head and MRI without acute event. EEG: No epileptiform or lateralizing abnormalities. This morning mentation is better after pt slept using BiPAP. Ammonia level is normal.     3.  SOB (shortness of breath) (3/2/2017) (3/2/2017)/ Elevated troponin (3/2/2017): Echocardiogram: EF is 65%, no wall motion abnormality. Empiric IV levofloxacin. Evaluated by cardiology and plan for cardiac workup at some point. Continue BiPAP as needed.       4. Rheumatoid arthritis involving multiple sites with positive rheumatoid factor ([facility] (10/7/2016): Holding Orencia     5. HTN (hypertension), benign (3/2/2017): BP better controlled. On Maxide    6. Morbid obesity. Would benefit from wt loss. Subjective:     Chief Complaint:  Confusion is better    ROS:  (bold if positive, if negative)    SOB/FOX   Tolerating PT  Tolerating Diet        Objective:     Last 24hrs VS reviewed since prior progress note.  Most recent are:    Visit Vitals    /64    Pulse 79    Temp 98.5 °F (36.9 °C)    Resp 15    Ht 5' 6\" (1.676 m)    Wt 119.9 kg (264 lb 4.8 oz)    SpO2 94%    BMI 42.66 kg/m2     SpO2 Readings from Last 6 Encounters:   17 94%   17 (!) 80%   10/28/16 98%   16 94%    O2 Flow Rate (L/min): 4 l/min       Intake/Output Summary (Last 24 hours) at 17 6080  Last data filed at 03/06/17 0531   Gross per 24 hour   Intake             1010 ml   Output              875 ml   Net              135 ml        Physical Exam:    Gen:  Well-developed, well-nourished, in no acute distress  HEENT:  Pink conjunctivae, PERRL, hearing intact to voice, moist mucous membranes  Neck:  Supple, without masses, thyroid non-tender  Resp:  No accessory muscle use, clear breath sounds without wheezes rales or rhonchi  Card:  No murmurs, normal S1, S2 without thrills, bruits or peripheral edema  Abd:  Soft, non-tender, non-distended, normoactive bowel sounds are present, no palpable organomegaly and no detectable hernias  Lymph:  No cervical or inguinal adenopathy  Musc:  No cyanosis or clubbing  Skin:  No rashes or ulcers, skin turgor is good  Neuro:  Cranial nerves are grossly intact, no focal motor weakness, follows commands appropriately  Psych:  Good insight, oriented to person, place and time, alert  __________________________________________________________________  Medications Reviewed: (see below)  Medications:     Current Facility-Administered Medications   Medication Dose Route Frequency    thiamine (B-1) 100 mg in 0.9% sodium chloride 50 mL IVPB  100 mg IntraVENous DAILY    metoprolol succinate (TOPROL-XL) XL tablet 25 mg  25 mg Oral DAILY    atorvastatin (LIPITOR) tablet 10 mg  10 mg Oral DAILY    levoFLOXacin (LEVAQUIN) tablet 750 mg  750 mg Oral Q24H    sodium chloride (NS) flush 5-10 mL  5-10 mL IntraVENous Q8H    sodium chloride (NS) flush 5-10 mL  5-10 mL IntraVENous PRN    acetaminophen (TYLENOL) tablet 650 mg  650 mg Oral Q4H PRN    naloxone (NARCAN) injection 0.4 mg  0.4 mg IntraVENous PRN    diphenhydrAMINE (BENADRYL) injection 12.5 mg  12.5 mg IntraVENous Q4H PRN    prochlorperazine (COMPAZINE) injection 5 mg  5 mg IntraVENous Q8H PRN    enoxaparin (LOVENOX) injection 40 mg  40 mg SubCUTAneous Q12H    albuterol-ipratropium (DUO-NEB) 2.5 MG-0.5 MG/3 ML  3 mL Nebulization Q6H RT    hydrALAZINE (APRESOLINE) 20 mg/mL injection 10 mg  10 mg IntraVENous Q6H PRN    ergocalciferol (ERGOCALCIFEROL) capsule 50,000 Units  50,000 Units Oral Q7D    loratadine (CLARITIN) tablet 10 mg  10 mg Oral DAILY    triamterene-hydroCHLOROthiazide (MAXZIDE) 37.5-25 mg per tablet 1 Tab  1 Tab Oral DAILY        Lab Data Reviewed: (see below)  Lab Review:     Recent Labs      03/06/17   0507  03/04/17   1100   WBC  7.6  10.4   HGB  13.7  14.5   HCT  46.2  47.7*   PLT  238  237     Recent Labs      03/06/17   0507   NA  143   K  3.5   CL  101   CO2  40*   GLU  94   BUN  20   CREA  0.79   CA  8.2*   ALB  2.4*   TBILI  0.7   SGOT  14*   ALT  34     No results found for: GLUCPOC  Recent Labs      03/05/17   0917  03/04/17   1032   PH  7.42  7.33*   PCO2  70*  85*   PO2  63*  58*   HCO3  45*  44*     No results for input(s): INR in the last 72 hours. No lab exists for component: INREXT, INREXT  All Micro Results     Procedure Component Value Units Date/Time    RESPIRATORY VIRAL PANEL, PCR [[id number]]     Order Status:  Sent Specimen:  Sputum     RESPIRATORY VIRAL PANEL, PCR [[id number]] Collected:  03/03/17 0945    Order Status:  Canceled Specimen:  Sputum           I have reviewed notes of prior 24hr. Other pertinent lab:       Total time spent with patient: [address] discussed with: Patient, Nursing Staff and >50% of time spent in counseling and coordination of care    Discussed:  Care Plan    Prophylaxis:  Lovenox    Disposition:  Home w/Family           ___________________________________________________    Attending Physician: Moustapha Esteves MD

## 2017-03-06 NOTE — ADVANCED PRACTICE NURSE
Attempted to follow up with pt as not discharged over the weekend. Pts response when I introduced my self was \"I don't do nurse practitioners\". Notified attending.

## 2017-03-06 NOTE — PROCEDURES
Chapo Dominguez [facility]   [address] [facility], [address]   ROUTINE EEG REPORT       Name:  Oskar Granados   MR#:  [id number]   :  1955   Account #:  [de-identified]    Date of Procedure:  2017   Date of Adm:  2017       REQUESTING PHYSICIAN: Hillary Diehl MD    INDICATIONS: An EEG is requested in this 72-year-old woman with   encephalopathy to evaluate for epileptiform abnormalities. MEDICATIONS LISTED   1. Nebs. 2. Lipitor. 3. Lovenox. 4. Vitamin D. FINDINGS: This tracing is obtained portably. The patient is noted to be   oriented to place, hospital, month and season. During this state, the tracing is degraded in portion by patient   generated movement and muscle artifact. The background consists of   diffuse mixed frequency theta range activities. There are triphasic   waves as well. Hyperventilation was not performed due to her condition. Intermittent photic stimulation little alters the tracing. Normal sleep architecture is not seen. INTERPRETATION: This EEG was recorded portably while the   patient was noted to be awake and partially oriented. It is abnormal   secondary to diffuse slowing and disorganization of the background   rhythms, indicative of a moderate degree of bihemispheric dysfunction,   as is commonly seen with an encephalopathy, which may have   contributions from toxic, metabolic, diffuse structural, and/or   pharmacologic effects, and clinical correlation is recommended. No   epileptiform or lateralizing abnormalities are seen.         Mary Chandler MD      SLE / FS   D:  2017   13:53   T:  2017   04:30   Job #:  [id number]

## 2017-03-06 NOTE — PROGRESS NOTES
2300 Patient refused bipap, with encouragement and explaination as to the need of the bipap, patient accepted to use the bipap. O2 sat at 97% on bipap. 7632  Patient remove bipap mask, noted with O2 sat in the 70's, immediate response apply mask back on patient brought O2 sat above 90. Patient noted without any distress. PCT assigned to sit with patient until otherwise determined. Bedside and Verbal shift change report given to [address] [place] [address] (oncoming nurse) by Stacy Hilario RN (offgoing nurse). Report included the following information SBAR, Kardex and MAR.

## 2017-03-07 VITALS
SYSTOLIC BLOOD PRESSURE: 131 MMHG | HEIGHT: 66 IN | HEART RATE: 71 BPM | BODY MASS INDEX: 42.47 KG/M2 | RESPIRATION RATE: 24 BRPM | WEIGHT: 264.3 LBS | TEMPERATURE: 99.7 F | OXYGEN SATURATION: 93 % | DIASTOLIC BLOOD PRESSURE: 69 MMHG

## 2017-03-07 PROCEDURE — 94640 AIRWAY INHALATION TREATMENT: CPT

## 2017-03-07 PROCEDURE — 77030013048 HC MSK CPAP W/HDGR FISP -B

## 2017-03-07 PROCEDURE — 77010033678 HC OXYGEN DAILY

## 2017-03-07 PROCEDURE — 74011250637 HC RX REV CODE- 250/637: Performed by: INTERNAL MEDICINE

## 2017-03-07 PROCEDURE — 74011000250 HC RX REV CODE- 250: Performed by: INTERNAL MEDICINE

## 2017-03-07 PROCEDURE — 77030013140 HC MSK NEB VYRM -A

## 2017-03-07 PROCEDURE — 77030013032 HC MSK BPAP/CPAP FISP -B

## 2017-03-07 PROCEDURE — 74011250636 HC RX REV CODE- 250/636: Performed by: INTERNAL MEDICINE

## 2017-03-07 RX ORDER — ATORVASTATIN CALCIUM 10 MG/1
10 TABLET, FILM COATED ORAL DAILY
Qty: 30 TAB | Refills: 0 | Status: SHIPPED | OUTPATIENT
Start: 2017-03-07 | End: 2017-07-14

## 2017-03-07 RX ORDER — IPRATROPIUM BROMIDE AND ALBUTEROL SULFATE 2.5; .5 MG/3ML; MG/3ML
3 SOLUTION RESPIRATORY (INHALATION)
Qty: 30 NEBULE | Refills: 0 | Status: SHIPPED | OUTPATIENT
Start: 2017-03-07 | End: 2017-07-14

## 2017-03-07 RX ORDER — LEVOFLOXACIN 750 MG/1
750 TABLET ORAL EVERY 24 HOURS
Qty: 2 TAB | Refills: 0 | Status: SHIPPED | OUTPATIENT
Start: 2017-03-07 | End: 2017-03-09 | Stop reason: ALTCHOICE

## 2017-03-07 RX ORDER — METOPROLOL SUCCINATE 25 MG/1
25 TABLET, EXTENDED RELEASE ORAL DAILY
Qty: 30 TAB | Refills: 0 | Status: SHIPPED | OUTPATIENT
Start: 2017-03-07 | End: 2017-03-24 | Stop reason: SDUPTHER

## 2017-03-07 RX ADMIN — METOPROLOL SUCCINATE 25 MG: 25 TABLET, FILM COATED, EXTENDED RELEASE ORAL at 08:06

## 2017-03-07 RX ADMIN — Medication 10 ML: at 05:45

## 2017-03-07 RX ADMIN — IPRATROPIUM BROMIDE AND ALBUTEROL SULFATE 3 ML: .5; 2.5 SOLUTION RESPIRATORY (INHALATION) at 02:55

## 2017-03-07 RX ADMIN — LORATADINE 10 MG: 10 TABLET ORAL at 08:06

## 2017-03-07 RX ADMIN — TRIAMTERENE AND HYDROCHLOROTHIAZIDE 1 TABLET: 37.5; 25 TABLET ORAL at 08:06

## 2017-03-07 RX ADMIN — IPRATROPIUM BROMIDE AND ALBUTEROL SULFATE 3 ML: .5; 2.5 SOLUTION RESPIRATORY (INHALATION) at 07:21

## 2017-03-07 RX ADMIN — ENOXAPARIN SODIUM 40 MG: 40 INJECTION SUBCUTANEOUS at 08:06

## 2017-03-07 RX ADMIN — ATORVASTATIN CALCIUM 10 MG: 10 TABLET, FILM COATED ORAL at 08:06

## 2017-03-07 NOTE — PROGRESS NOTES
3-7-2017 CASE MANAGEMENT NOTE:  Per the MD orders, I sent a referral to [address] [place] [address] for a Trilogy machine, oxygen and a nebulizer and this has all been approved by the [employer]. The representative from [address] [place] [address] discussed the pt's co-pay amounts with her and she is agreeable. The equipment will be delivered today and the pt is discharging home with her  today. Care Management Interventions  PCP Verified by CM: Yes (Dr. April Barraza)  Discharge Durable Medical Equipment: No  Physical Therapy Consult: No  Occupational Therapy Consult: No  Speech Therapy Consult: No  Current Support Network: Lives with Spouse, Own Home  Confirm Follow Up Transport: Family  Plan discussed with Pt/Family/Caregiver: Yes  Discharge Location  Discharge Placement:  (Home with  and oxygen, nebulizer and Trilogy from [employer])    Stuart Seals, [facility]

## 2017-03-07 NOTE — PROGRESS NOTES
Name: [address]: Nicki Pearson   : 1955 Admit Date: 3/2/2017   Phone: [phone]  Room: [facility]/01   PCP: Garth Zuñiga MD  MRN: [id number]   Date: 3/7/2017  Code: Full Code          Chart and notes reviewed. Data reviewed. I review the patient's current medications in the medical record at each encounter. I have evaluated and examined the patient. Overnight events  Afebrile  Sats 92% on 4L  Required 4L during 6 MWT to keep sats > 89%  PFTs with restriction and decreased diffusion capacity which corrects when considering alveolar ventilation - likely due to combination of obesity, OHS, and atelectasis  Viral panel negative  EEG consistent with encephalopathy  ECHO: EF 65%; no pulm HTN noted  MRI unremarkable    ROS: Reports feeling better this morning. Denies SOB or CP. Has occasional cough which is nonproductive and improving. Denies fever or chills. Denies abd pain. Reports mild ankle/pedal edema.         Current Facility-Administered Medications   Medication Dose Route Frequency    metoprolol succinate (TOPROL-XL) XL tablet 25 mg  25 mg Oral DAILY    atorvastatin (LIPITOR) tablet 10 mg  10 mg Oral DAILY    levoFLOXacin (LEVAQUIN) tablet 750 mg  750 mg Oral Q24H    sodium chloride (NS) flush 5-10 mL  5-10 mL IntraVENous Q8H    sodium chloride (NS) flush 5-10 mL  5-10 mL IntraVENous PRN    acetaminophen (TYLENOL) tablet 650 mg  650 mg Oral Q4H PRN    naloxone (NARCAN) injection 0.4 mg  0.4 mg IntraVENous PRN    diphenhydrAMINE (BENADRYL) injection 12.5 mg  12.5 mg IntraVENous Q4H PRN    prochlorperazine (COMPAZINE) injection 5 mg  5 mg IntraVENous Q8H PRN    enoxaparin (LOVENOX) injection 40 mg  40 mg SubCUTAneous Q12H    albuterol-ipratropium (DUO-NEB) 2.5 MG-0.5 MG/3 ML  3 mL Nebulization Q6H RT    hydrALAZINE (APRESOLINE) 20 mg/mL injection 10 mg  10 mg IntraVENous Q6H PRN    ergocalciferol (ERGOCALCIFEROL) capsule 50,000 Units  50,000 Units Oral Q7D    loratadine (CLARITIN) tablet 10 mg  10 mg Oral DAILY    triamterene-hydroCHLOROthiazide (MAXZIDE) 37.5-25 mg per tablet 1 Tab  1 Tab Oral DAILY         REVIEW OF SYSTEMS   Negative except as stated in the HPI. Physical Exam:   Visit Vitals    /62 (BP 1 Location: Right arm, BP Patient Position: Sitting)    Pulse 72    Temp 98 °F (36.7 °C)    Resp 26    Ht 5' 6\" (1.676 m)    Wt 119.9 kg (264 lb 4.8 oz)    SpO2 92%    BMI 42.66 kg/m2       General:  Alert, cooperative, no distress, appears stated age. Head:  Normocephalic, without obvious abnormality, atraumatic. Eyes:  Conjunctivae/corneas clear. Nose: Nares normal. Septum midline. Mucosa normal.    Throat: Lips, mucosa, and tongue normal.  Class 4 airway. Neck: Supple, symmetrical, trachea midline, no adenopathy. Lungs:   CTAB. No wheeze or rhonchi. No increased WOB or accessory muscle use. Chest wall:  No tenderness or deformity. Heart:  Regular rate and rhythm, S1, S2 normal, no murmur, click, rub or gallop. Abdomen:   Obese, soft, non-tender. Bowel sounds normal. No masses,  No organomegaly. Extremities: Extremities normal, atraumatic, no cyanosis, + peripheral edema. Pulses: 2+ and symmetric all extremities.    Skin: Skin color, texture, turgor normal. No rashes or lesions   Lymph nodes: Cervical, supraclavicular nodes normal.   Neurologic: Grossly nonfocal, mentation at baseline, no myoclonus noted during my exam       Lab Results   Component Value Date/Time    Sodium 143 03/06/2017 05:07 AM    Potassium 3.5 03/06/2017 05:07 AM    Chloride 101 03/06/2017 05:07 AM    CO2 40 03/06/2017 05:07 AM    BUN 20 03/06/2017 05:07 AM    Creatinine 0.79 03/06/2017 05:07 AM    Glucose 94 03/06/2017 05:07 AM    Calcium 8.2 03/06/2017 05:07 AM       Lab Results   Component Value Date/Time    WBC 7.6 03/06/2017 05:07 AM    HGB 13.7 03/06/2017 05:07 AM    PLATELET 600 52/61/3745 05:07 AM    MCV 94.7 03/06/2017 05:07 AM       Lab Results Component Value Date/Time    AST (SGOT) 14 03/06/2017 05:07 AM    Alk. phosphatase 75 03/06/2017 05:07 AM    Protein, total 6.1 03/06/2017 05:07 AM    Albumin 2.4 03/06/2017 05:07 AM    Globulin 3.7 03/06/2017 05:07 AM       No results found for: IRON, FE, TIBC, IBCT, PSAT, FERR    Lab Results   Component Value Date/Time    TSH 0.89 03/03/2017 09:25 AM        Lab Results   Component Value Date/Time    PH 7.40 03/06/2017 09:02 AM    PCO2 69 (H) 03/06/2017 09:02 AM    PO2 61 (L) 03/06/2017 09:02 AM    HCO3 42 (H) 03/06/2017 09:02 AM       Lab Results   Component Value Date/Time    Troponin-I, Qt. 0.07 03/03/2017 09:25 AM        No results found for: CULT    No results found for: TOXA1, RPR, HBCM, HBSAG, HAAB, HCAB, HCAB1, HAAT, G6PD, CRYAC, HIVGT, HIVR, HIV1, HIV12, HIVPC, HIVRPI    No results found for: VANCT, CPK    No results found for: COLOR, APPRN, SPGRU, TABATHA, PROTU, GLUCU, KETU, BILU, BLDU, UROU, ASHKAN, LEUKU, WBCU, RBCU, UEPI, BACTU, CASTS, UCRY      Images: no new images this morning    IMPRESSION  · Acute hypoxic hypercapnic respiratory failure: secondary to atx and OHS  · Hypercapnia/chronic CO2 retention  · Encephalopathy: improved; ammonia normal.  · Dyspnea: improved  · HTN  · RA; no evidence to suggest ILD on chest CT  · Former smoker    PLAN  · Wean O2 as able to keep sats > 90%  · Will need 4L O2 via NC at discharge; CM consulted to assist with arranging for this   · Will need trilogy at discharge for chronic hypercapnia; CM consulted to assist and order sent to [address] [place] [address]  · Duonebs  · Continue Levaquin for 1 more day  · Cardiology following; outpt stress test planned; ASA and statin initiated. Patient may also need right heart cath. · BP control per primary team/cardiology  · Patient needs outpatient sleep study  · DVT prophylaxis: Lovenox    Patient is stable from a pulmonary standpoint. We will sign off and arrange for outpatient follow up in 2-4 weeks. Please call with questions.     Aleisha Mcdonough [place], [place]

## 2017-03-07 NOTE — PROGRESS NOTES
[id number]  Bedside Report completed with Job Singh, [facility]  Patient has received discharge orders and is awaiting finalization of case management and home health concerns.          I have reviewed discharge instructions with the patient.   The patient verbalized understanding.

## 2017-03-07 NOTE — PROGRESS NOTES
Pharmacist Discharge Medication Reconciliation    Discharge Provider:  Antonio      Current Discharge Medication List        START taking these medications    Details   albuterol-ipratropium (DUO-NEB) 2.5 mg-0.5 mg/3 ml nebu 3 mL by Nebulization route every six (6) hours as needed. Qty: 30 Nebule, Refills: 0      levoFLOXacin (LEVAQUIN) 750 mg tablet Take 1 Tab by mouth every twenty-four (24) hours for 2 days. Qty: 2 Tab, Refills: 0      metoprolol succinate (TOPROL-XL) 25 mg XL tablet Take 1 Tab by mouth daily. Qty: 30 Tab, Refills: 0      atorvastatin (LIPITOR) 10 mg tablet Take 1 Tab by mouth daily. Qty: 30 Tab, Refills: 0           CONTINUE these medications which have NOT CHANGED    Details   fluticasone-salmeterol (ADVAIR DISKUS) 100-50 mcg/dose diskus inhaler Take 1 Puff by inhalation every twelve (12) hours. loratadine (CLARITIN) 10 mg tablet Take 10 mg by mouth daily. ergocalciferol (VITAMIN D2) 50,000 unit capsule Take 50,000 Units by mouth every seven (7) days. On Friday   Indications: VITAMIN D DEFICIENCY      ascorbic acid, vitamin C, (VITAMIN C) 250 mg tablet Take 250 mg by mouth daily. vitamins  A,C,E-zinc-copper (OCUVITE PRESERVISION) [phone] unit-mg-unit tab tablet Take 1 Tab by mouth two (2) times a day. triamterene-hydroCHLOROthiazide (MAXZIDE) 37.5-25 mg per tablet Take 1 Tab by mouth daily. Qty: 90 Tab, Refills: 3      cholecalciferol (VITAMIN D3) 1,000 unit tablet Take 1,000 Units by mouth daily. Except on Friday      EPINEPHrine (EPIPEN) 0.3 mg/0.3 mL (1:1,000) injection 0.3 mL by IntraMUSCular route once as needed. 2 pens  Qty: 0.6 mL, Refills: 0      ORENCIA 125 mg/mL syrg 125 mg by SubCUTAneous route every seven (7) days.  On Saturday           The patient's chart, MAR, and AVS were reviewed by   Pierre Iqbal, VARINDER, BCPS  Contact: [phone]

## 2017-03-07 NOTE — PROGRESS NOTES
Went in to do change of shift report, patient completely irate, stating she was told by the doctor that she would be able to take a shower and no one has helped her do so. Patient also stated she was told downstairs while during PFT testing that there was no reason why she couldn't take a shower. Explained to patient that being on this floor, patient's can't remove their telemetry without a doctors order saying that they can. At this point, both the patient and her  demanded to speak with a nursing supervisor. While the night shift RN, Erik Nava, contacted the nursing supervisor I called Dr. Scott Jolly and asked him about the patient's request to take a shower. After being told that it was okay, I placed the order in [facility] and updated the patient. Bedside and Verbal shift change report given to Brian Parra (oncoming nurse) by Josafat Mcgarry RN (offgoing nurse). Report included the following information SBAR, Procedure Summary, Intake/Output, MAR, Med Rec Status and Cardiac Rhythm NSR.

## 2017-03-07 NOTE — DISCHARGE INSTRUCTIONS
HOSPITALIST DISCHARGE INSTRUCTIONS  NAME: Leo Miller   :  1955   MRN:  [id number]     Date/Time:  3/7/2017 9:22 AM    ADMIT DATE: 3/2/2017     DISCHARGE DATE: 3/7/2017     ADMITTING DIAGNOSIS:  Shortness of breath  Hypoxia (low oxygen levels)    DISCHARGE DIAGNOSIS:  As above    MEDICATIONS:    · It is important that you take the medication exactly as they are prescribed. · Keep your medication in the bottles provided by the pharmacist and keep a list of the medication names, dosages, and times to be taken in your wallet. · Do not take other medications without consulting your doctor. Pain Management: per above medications    What to do at Home:  1. Please be sure to finish the course of antibiotics for your lung infection  2. Be sure to follow up with pulmonary in clinic for further testing regarding your lung disease    Recommended diet:  Resume previous diet    Recommended activity: Activity as tolerated    If you experience any of the following symptoms then please call your primary care physician or return to the emergency room if you cannot get hold of your doctor:  Fever, chills, nausea, vomiting, diarrhea, change in mentation, falling, bleeding, shortness of breath    Follow Up:  Dr. Jerrica Colón MD  you are to call and set up an appointment to see them in 1 week. Information obtained by :  I understand that if any problems occur once I am at home I am to contact my physician. I understand and acknowledge receipt of the instructions indicated above.                                                                                                                                            Physician's or R.N.'s Signature                                                                  Date/Time                                                                                                                                              Patient or Representative Signature Date/Time

## 2017-03-07 NOTE — ROUTINE PROCESS
2128  Patient just got out of shower and O2 sats were in the upper 70's. Patient denied feeling short of breathe.    2345  Patient had ambulated to bathroom without oxygen. When she returned to bed pulse ox was in 70's. After re-applying NC, quickly went back up into the 90's. 0730  Bedside and Verbal shift change report given to Disha Ferreira (oncoming nurse) by Ashia (offgoing nurse). Report included the following information SBAR, Kardex, ED Summary, Procedure Summary, Intake/Output, MAR, Recent Results and Cardiac Rhythm NSR.

## 2017-03-07 NOTE — DISCHARGE SUMMARY
Physician Discharge Summary     Patient ID:  Jens Martinez  [id number]  64 y.o.  1955    Admit date: 3/2/2017    Discharge date and time: 3/7/2017    Admission Diagnoses: SOB (shortness of breath)    Discharge Diagnoses: Active Problems:    Rheumatoid arthritis involving multiple sites with positive rheumatoid factor ([facility] [address] (10/7/2016)      SOB (shortness of breath) (3/2/2017)      HTN (hypertension), benign (3/2/2017)      Acute respiratory failure with hypoxia (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 CT scan: BL atelectasis. Continue IS. Treating empirically as pneumonia, discharged to complete course of levaquin. Pulmonology evaluation appreciated. Needs outpatient sleep study, PFT. Set up with trilogy for home and home oxygen      Acute encephalopathy: Likely secondary to CO2 retention, now improved with bipap. Neurology evaluation appreciated. CT of the head and MRI without acute event. EEG: No epileptiform or lateralizing abnormalities. This morning mentation is better after pt slept using BiPAP. Ammonia level is normal.       SOB (shortness of breath) / Elevated troponin: Echocardiogram: EF is 65%, no wall motion abnormality. Empiric IV levofloxacin. Evaluated by cardiology and plan for cardiac workup at some point. Continue BiPAP as needed.       Rheumatoid arthritis involving multiple sites with positive rheumatoid factor: Holding Orencia      HTN (hypertension), benign: BP better controlled. On Maxide. Added metoprolol      Morbid obesity. Would benefit from wt loss.      PCP: Francisco Javier Martinez MD     Consults: Pulmonary/Intensive care    Condition of patient at discharge: improved and stable    Discharge Exam:  Please refer to progress note from today.    Disposition: home    Patient Instructions:   Current Discharge Medication List      START taking these medications    Details   albuterol-ipratropium (DUO-NEB) 2.5 mg-0.5 mg/3 ml nebu 3 mL by Nebulization route every six (6) hours as needed. Qty: 30 Nebule, Refills: 0      levoFLOXacin (LEVAQUIN) 750 mg tablet Take 1 Tab by mouth every twenty-four (24) hours for 2 days. Qty: 2 Tab, Refills: 0      metoprolol succinate (TOPROL-XL) 25 mg XL tablet Take 1 Tab by mouth daily. Qty: 30 Tab, Refills: 0      atorvastatin (LIPITOR) 10 mg tablet Take 1 Tab by mouth daily. Qty: 30 Tab, Refills: 0         CONTINUE these medications which have NOT CHANGED    Details   fluticasone-salmeterol (ADVAIR DISKUS) 100-50 mcg/dose diskus inhaler Take 1 Puff by inhalation every twelve (12) hours. loratadine (CLARITIN) 10 mg tablet Take 10 mg by mouth daily. ergocalciferol (VITAMIN D2) 50,000 unit capsule Take 50,000 Units by mouth every seven (7) days. On Friday   Indications: VITAMIN D DEFICIENCY      ascorbic acid, vitamin C, (VITAMIN C) 250 mg tablet Take 250 mg by mouth daily. vitamins  A,C,E-zinc-copper (OCUVITE PRESERVISION) [phone] unit-mg-unit tab tablet Take 1 Tab by mouth two (2) times a day. triamterene-hydroCHLOROthiazide (MAXZIDE) 37.5-25 mg per tablet Take 1 Tab by mouth daily. Qty: 90 Tab, Refills: 3      cholecalciferol (VITAMIN D3) 1,000 unit tablet Take 1,000 Units by mouth daily. Except on Friday      EPINEPHrine (EPIPEN) 0.3 mg/0.3 mL (1:1,000) injection 0.3 mL by IntraMUSCular route once as needed. 2 pens  Qty: 0.6 mL, Refills: 0      ORENCIA 125 mg/mL syrg 125 mg by SubCUTAneous route every seven (7) days. On Saturday           Activity: Activity as tolerated  Diet: Resume previous diet  Wound Care: None needed    Follow-up with Jenny Mosqueda MD in 1 week.   Follow-up tests/labs as directed by pulmonary    Approximate time spent in patient care on day of discharge: 33 minutes    Signed:  Ned Yang MD  3/7/2017  9:34 AM

## 2017-03-07 NOTE — PROGRESS NOTES
[facility]  [address] [facility], [address] [place] [address]  [phone]      Medical Progress Note      NAME: Lisa Almanza   :  1955  MRM:  [id number]    Date/Time: 3/7/2017  9:24 AM         Subjective:     Chief Complaint:  Can I go home    No acute events. Pt mental status back tp baseline. States her sob is improved and she wants to go home today. She denies cp, abd pain. Objective:       Vitals:          Last 24hrs VS reviewed since prior progress note.  Most recent are:    Visit Vitals    /62 (BP 1 Location: Right arm, BP Patient Position: Sitting)    Pulse 72    Temp 98 °F (36.7 °C)    Resp 26    Ht 5' 6\" (1.676 m)    Wt 119.9 kg (264 lb 4.8 oz)    SpO2 92%    BMI 42.66 kg/m2     SpO2 Readings from Last 6 Encounters:   17 92%   17 (!) 80%   10/28/16 98%   16 94%    O2 Flow Rate (L/min): 4 l/min     Intake/Output Summary (Last 24 hours) at 17 0924  Last data filed at 17 0739   Gross per 24 hour   Intake             1190 ml   Output             1250 ml   Net              -60 ml          Exam:     Physical Exam:    Gen:  obese, in no acute distress  HEENT:  Pink conjunctivae, PERRL, hearing intact to voice, moist mucous membranes  Neck:  Supple, without masses, thyroid non-tender  Resp:  No accessory muscle use, clear breath sounds without wheezes rales or rhonchi  Card:  No murmurs, normal S1, S2 without thrills, bruits or peripheral edema  Abd:  Soft, non-tender, non-distended, normoactive bowel sounds are present  Musc:  No cyanosis or clubbing  Skin:  No rashes or ulcers, skin turgor is good  Neuro:  Cranial nerves 3-12 are grossly intact,  strength is 5/5 bilaterally and dorsi / plantarflexion is 5/5 bilaterally, follows commands appropriately  Psych:  Good insight, oriented to person, place and time, alert       Telemetry reviewed:   normal sinus rhythm    Medications Reviewed: (see below)    Lab Data Reviewed: (see below)    ______________________________________________________________________    Medications:     Current Facility-Administered Medications   Medication Dose Route Frequency    metoprolol succinate (TOPROL-XL) XL tablet 25 mg  25 mg Oral DAILY    atorvastatin (LIPITOR) tablet 10 mg  10 mg Oral DAILY    levoFLOXacin (LEVAQUIN) tablet 750 mg  750 mg Oral Q24H    sodium chloride (NS) flush 5-10 mL  5-10 mL IntraVENous Q8H    sodium chloride (NS) flush 5-10 mL  5-10 mL IntraVENous PRN    acetaminophen (TYLENOL) tablet 650 mg  650 mg Oral Q4H PRN    naloxone (NARCAN) injection 0.4 mg  0.4 mg IntraVENous PRN    diphenhydrAMINE (BENADRYL) injection 12.5 mg  12.5 mg IntraVENous Q4H PRN    prochlorperazine (COMPAZINE) injection 5 mg  5 mg IntraVENous Q8H PRN    enoxaparin (LOVENOX) injection 40 mg  40 mg SubCUTAneous Q12H    albuterol-ipratropium (DUO-NEB) 2.5 MG-0.5 MG/3 ML  3 mL Nebulization Q6H RT    hydrALAZINE (APRESOLINE) 20 mg/mL injection 10 mg  10 mg IntraVENous Q6H PRN    ergocalciferol (ERGOCALCIFEROL) capsule 50,000 Units  50,000 Units Oral Q7D    loratadine (CLARITIN) tablet 10 mg  10 mg Oral DAILY    triamterene-hydroCHLOROthiazide (MAXZIDE) 37.5-25 mg per tablet 1 Tab  1 Tab Oral DAILY            Lab Review:     Recent Labs      03/06/17   0507  03/04/17   1100   WBC  7.6  10.4   HGB  13.7  14.5   HCT  46.2  47.7*   PLT  238  237     Recent Labs      03/06/17   0507   NA  143   K  3.5   CL  101   CO2  40*   GLU  94   BUN  20   CREA  0.79   CA  8.2*   ALB  2.4*   SGOT  14*   ALT  34     No components found for: [place]         Assessment / Plan:     Acute respiratory failure with hypoxia / hypercarbia: Continue supplemental oxygen to keep SAO2 > 90%. CT scan: BL atelectasis. Continue IS. Treating empirically as pneumonia, discharged to complete course of levaquin. Pulmonology evaluation appreciated. Needs outpatient sleep study, PFT.  Set up with trilogy for home and home oxygen      Acute encephalopathy: Likely secondary to CO2 retention, now improved with bipap. Neurology evaluation appreciated. CT of the head and MRI without acute event. EEG: No epileptiform or lateralizing abnormalities. This morning mentation is better after pt slept using BiPAP. Ammonia level is normal.      SOB (shortness of breath) / Elevated troponin: Echocardiogram: EF is 65%, no wall motion abnormality. Empiric IV levofloxacin. Evaluated by cardiology and plan for cardiac workup at some point. Continue BiPAP as needed.       Rheumatoid arthritis involving multiple sites with positive rheumatoid factor: Holding Orencia      HTN (hypertension), benign: BP better controlled. On Maxide. Added metoprolol     Morbid obesity. Would benefit from wt loss.          Total time spent with patient: [address] discussed with: Patient, Care Manager and Consultant/Specialist    Discussed:  Care Plan    Prophylaxis:  Lovenox    Disposition:  Home w/Family           ___________________________________________________    Attending Physician: Devon Patino MD

## 2017-03-08 ENCOUNTER — TELEPHONE (OUTPATIENT)
Dept: INTERNAL MEDICINE CLINIC | Age: 62
End: 2017-03-08

## 2017-03-08 ENCOUNTER — PATIENT OUTREACH (OUTPATIENT)
Dept: INTERNAL MEDICINE CLINIC | Age: 62
End: 2017-03-08

## 2017-03-08 NOTE — PROGRESS NOTES
[facility] Hospitalization           Referral from [facility], [facility]. Patient [facility] on 3/2/17 and discharged on 3/4/17 with diagnosis of Acute respiratory failure with hypoxia / hypercarbia; Acute encephalopathy, Elevated Troponin, Pericardial Effusion. Significant Lab/Diagnostic Findings:  Lab Results  Component Value Date/Time   WBC 7.6 03/06/2017 05:07 AM   HGB 13.7 03/06/2017 05:07 AM   HCT 46.2 03/06/2017 05:07 AM   PLATELET 213 12/55/8531 05:07 AM   MCV 94.7 03/06/2017 05:07 AM       Lab Results   Component Value Date/Time    Troponin-I, Qt. 0.07 03/03/2017 09:25 AM      Lab Results   Component Value Date/Time    NT pro-BNP 2667 03/02/2017 05:47 PM      Lab Results   Component Value Date/Time    Sodium 143 03/06/2017 05:07 AM    Potassium 3.5 03/06/2017 05:07 AM    Chloride 101 03/06/2017 05:07 AM    CO2 40 03/06/2017 05:07 AM    Anion gap 2 03/06/2017 05:07 AM    Glucose 94 03/06/2017 05:07 AM    BUN 20 03/06/2017 05:07 AM    Creatinine 0.79 03/06/2017 05:07 AM    BUN/Creatinine ratio 25 03/06/2017 05:07 AM    GFR est AA >60 03/06/2017 05:07 AM    GFR est non-AA >60 03/06/2017 05:07 AM    Calcium 8.2 03/06/2017 05:07 AM      Lab Results   Component Value Date/Time    Sodium 143 03/06/2017 05:07 AM    Potassium 3.5 03/06/2017 05:07 AM    Chloride 101 03/06/2017 05:07 AM    CO2 40 03/06/2017 05:07 AM    Anion gap 2 03/06/2017 05:07 AM    Glucose 94 03/06/2017 05:07 AM    BUN 20 03/06/2017 05:07 AM    Creatinine 0.79 03/06/2017 05:07 AM    BUN/Creatinine ratio 25 03/06/2017 05:07 AM    GFR est AA >60 03/06/2017 05:07 AM    GFR est non-AA >60 03/06/2017 05:07 AM    Calcium 8.2 03/06/2017 05:07 AM    Bilirubin, total 0.7 03/06/2017 05:07 AM    ALT (SGPT) 34 03/06/2017 05:07 AM    AST (SGOT) 14 03/06/2017 05:07 AM    Alk.  phosphatase 75 03/06/2017 05:07 AM    Protein, total 6.1 03/06/2017 05:07 AM    Albumin 2.4 03/06/2017 05:07 AM    Globulin 3.7 03/06/2017 05:07 AM    A-G Ratio 0.6 03/06/2017 05:07 AM              ABG:  Component      Latest Ref Rng & Units 3/6/2017 3/5/2017           9:02 AM  9:17 AM   pH      7.35 - 7.45   7.40 7.42   PCO2      35.0 - 45.0 mmHg 69 (H) 70 (H)   PO2      80 - 100 mmHg 61 (L) 63 (L)   O2 SAT      92 - 97 % 90 (L) 92   BICARBONATE      22 - 26 mmol/L 42 (H) 45 (H)   BASE EXCESS      mmol/L 13.2 16.2   O2 METHOD       NASAL O2 NASAL O2   O2 FLOW RATE      L/min 4.00 6.00   Sample source       ARTERIAL ARTERIAL   SITE       RIGHT RADIAL RIGHT RADIAL   PAUL'S TEST       YES YES   Critical value read back             Component      Latest Ref Rng & Units 3/4/2017          10:32 AM   pH      7.35 - 7.45   7.33 (L)   PCO2      35.0 - 45.0 mmHg 85 (H)   PO2      80 - 100 mmHg 58 (L)   O2 SAT      92 - 97 % 87 (L)   BICARBONATE      22 - 26 mmol/L 44 (H)   BASE EXCESS      mmol/L 13.4   O2 METHOD       NASAL O2   O2 FLOW RATE      L/min 3.00   Sample source       ARTERIAL   SITE       RIGHT RADIAL   PAUL'S TEST       YES   Critical value read back       Bryant Pillaier RN           Component      Latest Ref Rng & Units 3/4/2017          11:00 AM   Ammonia      <32 UMOL/L 18           Respiratory Viral Panel:  Component      Latest Ref Rng & Units 3/6/2017           9:00 AM   Adenovirus      NOTD   NOT DETECTED   Coronavirus 229E      NOTD   NOT DETECTED   Coronavirus HKU1      NOTD   NOT DETECTED   Coronavirus CVNL63      NOTD   NOT DETECTED   Coronavirus OC43      NOTD   NOT DETECTED   Metapneumovirus      NOTD   NOT DETECTED   Rhinovirus and Enterovirus      NOTD   NOT DETECTED   Influenza A      NOTD   NOT DETECTED   Influenza A, subtype H1      NOTD   NOT DETECTED   Influenza A, subtype H3      NOTD   NOT DETECTED   INFLUENZA A H1N1 PCR      NOTD   NOT DETECTED   Influenza B      NOTD   NOT DETECTED   Parainfluenza 1      NOTD   NOT DETECTED   Parainfluenza 2      NOTD   NOT DETECTED   Parainfluenza 3      NOTD   NOT DETECTED   Parainfluenza virus 4      NOTD NOT DETECTED   RSV by PCR      NOTD   NOT DETECTED   Bordetella pertussis - PCR      NOTD   NOT DETECTED   Chlamydophila pneumoniae DNA, QL, PCR      NOTD   NOT DETECTED   Mycoplasma pneumoniae DNA, QL, PCR      NOTD   NOT DETECTED           NN has updated care team members in the patient's chart. RRAT score:   Low Risk            12       Total Score        3 Relationship with PCP    2 Patient Living Status    4 More than 1 Admission in calendar year    3 Charlson Comorbidity Score        Criteria that do not apply:    Patient Length of Stay > 5    Patient Insurance is Medicare, Medicaid or Self Pay                  Advance Medical Directive on file in EMR? No.  Patient was evaluated by care management during hospitalization. Per notes, [facility] Home; Trilogy Machine through [employer] provided by the [facility]. Recommended Post-Hospital Discharge follow-up (see below as listed on Hospital Discharge AVS/Instructions). Follow-up Information     Follow up With Details Comments Contact Info     Barbara Simpson MD On 3/9/2017 11:20 am  [address] [facility] Maribell Pan MD     [address] [facility]  [address] [facility]  [phone]     Gerson Jaime MD In 1 week   [address]  [phone]     What to do at Home:  1. Please be sure to finish the course of antibiotics for your lung infection  2. Be sure to follow up with pulmonary in clinic for further testing regarding your lung disease            Most recent HIPAA form in the patient's chart (dated 3/23/16) was reviewed; authorized individual(s) listed on HIPAA form include None. NN follow-up phone call  NN contacted the patient today to complete NN post-hospital discharge assessment. Two patient identifiers verified.   NN introduced self to the patient, including NN role and purpose of NN follow-up phone call; patient verbalizes understanding. Patient reports that she is currently in another physician's office and requests to return phone call to this NN; NN contact information provided. New medications at discharge include: Duo-Neb, Lipitor, Levaquin, Toprol-XL   Medication(s) changed at hospital discharge include: N/A  Medication(s) discontinued at hospital discharge include: N/A  Patient able to fill/pickup new medications without difficulty: *Unable to assess at this time. Any Questions/Concerns regarding new Medication(s) and/or Medication Change(s): *Unable to assess at this time. Future Appointments      Provider Dimas Hawthorne   3/9/2017 11:20 AM Jane Ralph MD [facility] [address] [facility]             NN will route this encounter to Dr. Roxane Valadez for notification/review. This note will not be viewable in [address]

## 2017-03-09 ENCOUNTER — PATIENT OUTREACH (OUTPATIENT)
Dept: INTERNAL MEDICINE CLINIC | Age: 62
End: 2017-03-09

## 2017-03-09 ENCOUNTER — OFFICE VISIT (OUTPATIENT)
Dept: CARDIOLOGY CLINIC | Age: 62
End: 2017-03-09

## 2017-03-09 VITALS
BODY MASS INDEX: 43.39 KG/M2 | HEART RATE: 69 BPM | HEIGHT: 66 IN | SYSTOLIC BLOOD PRESSURE: 130 MMHG | WEIGHT: 270 LBS | OXYGEN SATURATION: 98 % | DIASTOLIC BLOOD PRESSURE: 90 MMHG

## 2017-03-09 DIAGNOSIS — R60.9 PERIPHERAL EDEMA: ICD-10-CM

## 2017-03-09 DIAGNOSIS — R77.8 ELEVATED TROPONIN: ICD-10-CM

## 2017-03-09 DIAGNOSIS — I10 ESSENTIAL HYPERTENSION: Primary | ICD-10-CM

## 2017-03-09 RX ORDER — POTASSIUM CHLORIDE 750 MG/1
10 CAPSULE, EXTENDED RELEASE ORAL 2 TIMES DAILY
COMMUNITY
End: 2017-03-09 | Stop reason: SDUPTHER

## 2017-03-09 RX ORDER — FUROSEMIDE 40 MG/1
TABLET ORAL DAILY
COMMUNITY
End: 2017-03-09 | Stop reason: SDUPTHER

## 2017-03-09 RX ORDER — POTASSIUM CHLORIDE 750 MG/1
CAPSULE, EXTENDED RELEASE ORAL
Qty: 30 CAP | Refills: 4 | Status: SHIPPED | OUTPATIENT
Start: 2017-03-09 | End: 2017-03-24 | Stop reason: SDUPTHER

## 2017-03-09 RX ORDER — FUROSEMIDE 40 MG/1
TABLET ORAL
Qty: 36 TAB | Refills: 4 | Status: SHIPPED | OUTPATIENT
Start: 2017-03-09 | End: 2017-03-24 | Stop reason: SDUPTHER

## 2017-03-09 NOTE — MR AVS SNAPSHOT
Visit Information Date & Time Provider Department Dept. Phone Encounter #  
 3/9/2017 11:20 AM Carmen Guzman MD [facility] [phone] [id number] Upcoming Health Maintenance Date Due Hepatitis C Screening 1955 DTaP/Tdap/Td series (1 - Tdap) 11/10/1976 PAP AKA CERVICAL CYTOLOGY 11/10/1976 BREAST CANCER SCRN MAMMOGRAM 11/10/2005 FOBT Q 1 YEAR AGE 50-75 11/10/2005 ZOSTER VACCINE AGE 60> 11/10/2015 INFLUENZA AGE 9 TO ADULT 8/1/2016 Allergies as of 3/9/2017  Review Complete On: 3/9/2017 By: Mina Stern  
  
 Severity Noted Reaction Type Reactions Bee Sting [Sting, Bee] High 03/23/2016    Anaphylaxis Codeine High 03/23/2016    Hives, Shortness of Breath  
 bp drop Methotrexate  03/23/2016    Nausea and Vomiting Penicillins  03/23/2016    Hives, Shortness of Breath  
 bp drop Current Immunizations  Never Reviewed No immunizations on file. Not reviewed this visit Vitals BP Pulse Height(growth percentile) Weight(growth percentile) LMP SpO2  
 130/90 (BP 1 Location: Right arm, BP Patient Position: Sitting) 69 5' 6\" (1.676 m) 270 lb (122.5 kg) 02/23/2013 98% BMI OB Status Smoking Status 43.58 kg/m2 Postmenopausal Former Smoker Vitals History BMI and BSA Data Body Mass Index Body Surface Area 43.58 kg/m 2 2.39 m 2 Preferred Pharmacy Pharmacy Name Phone [phone] [phone] Your Updated Medication List  
  
   
This list is accurate as of: 3/9/17 12:23 PM.  Always use your most recent med list.  
  
  
  
  
 ADVAIR DISKUS 100-50 mcg/dose diskus inhaler Generic drug:  fluticasone-salmeterol Take 1 Puff by inhalation every twelve (12) hours. albuterol-ipratropium 2.5 mg-0.5 mg/3 ml Nebu Commonly known as:  Ulises Blanca  
 3 mL by Nebulization route every six (6) hours as needed. atorvastatin 10 mg tablet Commonly known as:  LIPITOR Take 1 Tab by mouth daily. cholecalciferol 1,000 unit tablet Commonly known as:  VITAMIN D3 Take 2,000 Units by mouth daily. Except on Friday CLARITIN 10 mg tablet Generic drug:  loratadine Take 10 mg by mouth daily. EPINEPHrine 0.3 mg/0.3 mL injection Commonly known as:  EPIPEN  
0.3 mL by IntraMUSCular route once as needed. 2 pens  
  
 metoprolol succinate 25 mg XL tablet Commonly known as:  TOPROL-XL Take 1 Tab by mouth daily. Ocuvite PreserVision [phone] unit-mg-unit Tab tablet Generic drug:  vitamins  A,C,E-zinc-copper Take 1 Tab by mouth two (2) times a day. ORENCIA 125 mg/mL Syrg Generic drug:  abatacept 125 mg by SubCUTAneous route every seven (7) days. On Saturday  
  
 triamterene-hydroCHLOROthiazide 37.5-25 mg per tablet Commonly known as:  Soundra [place] Take 1 Tab by mouth daily. VITAMIN C 250 mg tablet Generic drug:  ascorbic acid (vitamin C) Take 250 mg by mouth daily. VITAMIN D2 50,000 unit capsule Generic drug:  ergocalciferol Take 50,000 Units by mouth every seven (7) days. On Friday   Indications: VITAMIN D DEFICIENCY Introducing \[facility] [place] [facility]\"" & HEALTH SERVICES! Dear Kofi Sep: Thank you for requesting a [employer] account. Our records indicate that you already have an active [employer] account. You can access your account anytime at [email or web address] Did you know that you can access your hospital and ER discharge instructions at any time in [employer]? You can also review all of your test results from your hospital stay or ER visit. Additional Information If you have questions, please visit the Frequently Asked Questions section of the [employer] website at [email or web address] Remember, [employer] is NOT to be used for urgent needs. For medical emergencies, dial 911. Now available from your iPhone and Android! Please provide this summary of care documentation to your next provider. Your primary care clinician is listed as Earl Bills. If you have any questions after today's visit, please call [phone].

## 2017-03-09 NOTE — PROGRESS NOTES
[facility] Post Hospitalization             NN follow-up phone call - Second Attempt  NN contacted the patient today to complete NN post-hospital discharge assessment. Two patient identifiers verified. NN introduced self to the patient, including NN role and purpose of NN follow-up phone call; patient verbalizes understanding. NN inquired about the patient's current condition and how the patient is feeling. Patient denies fever, chills, chest pain since hospital discharge and at present. Patient denies increased shortness of breath with ambulation/activity and denies shortness of breath at rest since hospital discharge/at present. Patient reports \"occasional\" non-productive cough; states, \"Nothing like it was before. \"    Patient reports that she is currently on continuous home oxygen at 4 lpm. Patient reports that she is in need of smaller oxygen concentrator for use while she is at work during the day because portable oxygen tanks only last approximately two hours each; states that she discussed this with the Pulmonologist at her outpatient follow-up appointment and their office is working on this. Patient reports compliance with Trilogy machine during the night. See details of Functional Assessment below as reported by the patient. Living Situation/Support System: Patient reports a good support system and denies any concerns. ADLs: Independent with ADLs. Mobility: Independent with Ambulation; denies history of falls within the past 6 months. Transportation: Denies any concerns regarding transportation. Medication Management: Independently manages her medications. Financial Status: Denies any financial concerns regarding her medications. Barriers to care? no       Allergies Reviewed with patient and Medication Reconciliation completed and updated. Patient verbalizes understanding of self management of medications and reports compliance with prescribed medication regimen.         Med Rec during this call  Current Outpatient Prescriptions   Medication Sig    furosemide (LASIX) 40 mg tablet Take 1 tab bid xs 3 days and then 1 tab daily thereafter    potassium chloride SA (MICRO-K) 10 mEq capsule Take 1 cap daily    albuterol-ipratropium (DUO-NEB) 2.5 mg-0.5 mg/3 ml nebu 3 mL by Nebulization route every six (6) hours as needed.  metoprolol succinate (TOPROL-XL) 25 mg XL tablet Take 1 Tab by mouth daily.  loratadine (CLARITIN) 10 mg tablet Take 10 mg by mouth daily.  ergocalciferol (VITAMIN D2) 50,000 unit capsule Take 50,000 Units by mouth every seven (7) days. On Friday   Indications: VITAMIN D DEFICIENCY    ascorbic acid, vitamin C, (VITAMIN C) 250 mg tablet Take 250 mg by mouth daily.  vitamins  A,C,E-zinc-copper (OCUVITE PRESERVISION) [phone] unit-mg-unit tab tablet Take 1 Tab by mouth two (2) times a day.  EPINEPHrine (EPIPEN) 0.3 mg/0.3 mL (1:1,000) injection 0.3 mL by IntraMUSCular route once as needed. 2 pens    ORENCIA 125 mg/mL syrg 125 mg by SubCUTAneous route every seven (7) days. On Saturday    cholecalciferol (VITAMIN D3) 1,000 unit tablet Take 2,000 Units by mouth daily. Except on Friday    atorvastatin (LIPITOR) 10 mg tablet Take 1 Tab by mouth daily. (Patient not taking: Reported on 3/9/2017)    fluticasone-salmeterol (ADVAIR DISKUS) 100-50 mcg/dose diskus inhaler Take 1 Puff by inhalation every twelve (12) hours. No current facility-administered medications for this visit. Medications Discontinued During This Encounter   Medication Reason    potassium bicarbonate (KLYTE) 25 mEq tablet Not A Current Medication               Klyte discontinued because patient reports that she is not currently taking. Not A Current Medication. Med rec updated. NOTE regarding Advair  Patient reports that she has not been using Advair on a routine basis; reports that she most recently used Advair approximately 1.5 weeks ago.  Per EMR, Advair was prescribed by PCP office on 10/7/16 for diagnosis of Bronchitis; per notes, sample inhaler was provided to the patient on this date and patient reports 6 inhalations remaining in inhaler. Patient reports that she notified Dr. Ammy Cisneros of this, however he did not advise as to whether she should take Advair routinely. NN will notify Dr. Bobbi Liu. NOTE regarding Lipitor  Patient states that she is not going to begin taking Lipitor until she follows-up/discusses it with her Rheumatologist.        New medications at discharge include: Duo-Neb, Lipitor, Levaquin, Toprol-XL   Medication(s) changed at hospital discharge include: N/A  Medication(s) discontinued at hospital discharge include: N/A  Patient able to fill/pickup new medications without difficulty: Yes. Any Questions/Concerns regarding new Medication(s) and/or Medication Change(s): Denies any questions/concerns.         NN attempted to schedule the patient for a [place] appointment with Dr. Bobbi Liu, however patient reports that appointment must be scheduled on a Friday due to her work schedule; reports that she is unable to take any additional time off of work, without jeopardizing her job, due to recent time out. NN will route this encounter to LPN  to request patient follow-up tomorrow to schedule [place] appointment with Dr. Bobbi Liu; no scheduling availability tomorrow, 3/10/17, and Dr. Bobbi Liu out of the office on 3/17/17. Patient verbalizes understanding of discharge instructions that were provided to her upon discharge from [facility]. Opportunity for patient to ask NN questions was provided. NN contact information provided to patient and patient advised to contact NN as needed.         PLAN      -Patient to attend Transitions Of Care Appointment with Dr. Bobbi Liu - to be scheduled.  -Patient attended follow-up appointment(s) with Surgeon(if applicable) and/or Speciality Provider(s): Dr. Ernestine Fernández (Cardiology) on Today, 3/9/17.  -Patient reports that she attended office visit with Dr. Daniel Mai (Pulmonology) on 3/8/17; patient reports that Sleep Medicine Consult is scheduled for tomorrow, 3/10/17, at 1:30 pm [facility]). -Patient to contact this NN and/or PCP office with any questions/concerns.  -Notified of availability of PCP on-call physician after-hours and on the weekends for non-emergent/non-life threatening medical questions/concerns; patient verbalizes understanding. Patient expressed no questions, concerns or needs for this NN at this time. Patient verbalized understanding of all information discussed. NN contact information provided and patient advised to contact NN as needed. NN will route this encounter to Dr. Holli Brown for notification/review. NN will route this encounter to Ambulatory CM NN for notification/review and continued patient follow-up during YESSENIA period. This note will not be viewable in [address]

## 2017-03-09 NOTE — Clinical Note
YESSENIA opened Needs YESSEN[place] appt with Dr. Roxane Valadez; routing to LPN  for assistance;  Needs YESSENIA appt on or prior to 3/17/17 (see encounter for scheduling conflicts)

## 2017-03-09 NOTE — PROGRESS NOTES
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  ICD-9-CM: 333.2  3/4/2017        SOB (shortness of breath) ICD-10-CM: R06.02  ICD-9-CM: 786.05  3/2/2017        HTN (hypertension), benign ICD-10-CM: I10  ICD-9-CM: 401.1  3/2/2017        Acute respiratory failure with hypoxia (HCC) ICD-10-CM: J96.01  ICD-9-CM: 518.81  3/2/2017        Elevated troponin ICD-10-CM: R79.89  ICD-9-CM: 790.6  3/2/2017        Pericardial effusion ICD-10-CM: I31.3  ICD-9-CM: 423.9  3/2/2017        Rheumatoid arthritis involving multiple sites with positive rheumatoid factor ([facility] [address] ICD-10-CM: M05.79  ICD-9-CM: 714.0  10/7/2016        Essential hypertension ICD-10-CM: I10  ICD-9-CM: 401.9  10/7/2016                 Plan:     1. Positive Troponin: Could be respiratory failure related or due to CAD. Will check stress Nuc lexiscan study to r/o CAD. Continue ASA. 2. Peripheral Edema: Will start Lasix 40 BID for 3 days and then daily. KCL daily. Stop Maxzide  3. HTN: Stop Maxzide. Start Lasix   4. Dyslipidemia: She stopped taking Lipitor due to fear of interaction with her RA. She will clear this with her rheumatologist.   5. FU with me in  6months. Phone followup of test results. Subjective:     Al Landa, a 64y.o. year-old who presents for followup of recent hospitalization last week for respiratory failure from bronchitis. She is getting better but she has noticed significant LE edema. She has no chest pain. Her Trop peaked at 0.09 during hospital stay. She is now on 4L nasal O2.      Exam:     Physical Exam:  Visit Vitals    /90 (BP 1 Location: Right arm, BP Patient Position: Sitting)    Pulse 69    Ht 5' 6\" (1.676 m)    Wt 270 lb (122.5 kg)    LMP 2013  SpO2 98%    BMI 43.58 kg/m2     General appearance - alert, well appearing, and in no distress  Mental status - affect appropriate to mood  Eyes - sclera anicteric, moist mucous membranes  Neck - supple, no significant adenopathy  Chest - clear to auscultation, no wheezes, rales or rhonchi  Heart - normal rate, regular rhythm, normal S1, S2, no murmurs, rubs, clicks or gallops  Abdomen - soft, nontender, nondistended, no masses or organomegaly  Extremities - peripheral pulses normal, 2+ pedal edema  Skin - normal coloration  no rashes    Medications:     Current Outpatient Prescriptions   Medication Sig    albuterol-ipratropium (DUO-NEB) 2.5 mg-0.5 mg/3 ml nebu 3 mL by Nebulization route every six (6) hours as needed.  metoprolol succinate (TOPROL-XL) 25 mg XL tablet Take 1 Tab by mouth daily.  fluticasone-salmeterol (ADVAIR DISKUS) 100-50 mcg/dose diskus inhaler Take 1 Puff by inhalation every twelve (12) hours.  loratadine (CLARITIN) 10 mg tablet Take 10 mg by mouth daily.  ergocalciferol (VITAMIN D2) 50,000 unit capsule Take 50,000 Units by mouth every seven (7) days. On Friday   Indications: VITAMIN D DEFICIENCY    ascorbic acid, vitamin C, (VITAMIN C) 250 mg tablet Take 250 mg by mouth daily.  vitamins  A,C,E-zinc-copper (OCUVITE PRESERVISION) [phone] unit-mg-unit tab tablet Take 1 Tab by mouth two (2) times a day.  EPINEPHrine (EPIPEN) 0.3 mg/0.3 mL (1:1,000) injection 0.3 mL by IntraMUSCular route once as needed. 2 pens    ORENCIA 125 mg/mL syrg 125 mg by SubCUTAneous route every seven (7) days. On Saturday    cholecalciferol (VITAMIN D3) 1,000 unit tablet Take 2,000 Units by mouth daily. Except on Friday    potassium bicarbonate (KLYTE) 25 mEq tablet Take 25 mEq by mouth two (2) times a day.     furosemide (LASIX) 40 mg tablet Take 1 tab bid xs 3 days and then 1 tab daily thereafter    potassium chloride SA (MICRO-K) 10 mEq capsule Take 1 cap daily    atorvastatin (LIPITOR) 10 mg tablet Take 1 Tab by mouth daily. No current facility-administered medications for this visit. Diagnostic Data Review:       ECHO 3/3/2017: EF 65%, no WMA      Lab Review:   No results found for: CHOL, CHOLX, CHLST, CHOLV, 632171, HDL, LDL, DLDL, LDLC, DLDLP, TGL, TGLX, TRIGL, [id number], TRIGP, CHHD, CHHDX  Lab Results   Component Value Date/Time    Creatinine 0.79 03/06/2017 05:07 AM     Lab Results   Component Value Date/Time    BUN 20 03/06/2017 05:07 AM     Lab Results   Component Value Date/Time    Potassium 3.5 03/06/2017 05:07 AM     No results found for: HBA1C, HGBE8, [id number]  Lab Results   Component Value Date/Time    HGB 13.7 03/06/2017 05:07 AM     Lab Results   Component Value Date/Time    PLATELET 892 93/24/7023 05:07 AM     No results for input(s): CPK, CKMB, TROIQ in the last 72 hours. No lab exists for component: CKQMB, CPKMB             ___________________________________________________    Vania Dodd.  Judith Castañeda MD, [facility]

## 2017-03-09 NOTE — PROGRESS NOTES
Visit Vitals    /90 (BP 1 Location: Right arm, BP Patient Position: Sitting)    Pulse 69    Ht 5' 6\" (1.676 m)    Wt 270 lb (122.5 kg)    SpO2 98%    BMI 43.58 kg/m2

## 2017-03-11 NOTE — PROCEDURES
[facility]   [facility], [facility]   [address] [facility]       Name:  Francy Bundy   MR#:  [id number]   :  1955   Account #:  [de-identified]    Date of Procedure:  2017   Date of Adm:  2017            REFERRING PHYSICIAN:   Diana Noel MD    CLINICAL DIAGNOSIS/INDICATIONS:   Shortness of breath. INTERPRETATION:   Clinical spirometry is not indicative of an   obstructive or restrictive defect by FEV1/FVC ratio. However, both the   FVC and FEV1 are both severely reduced individually. There is very   severe obstruction seen in the distal small airways as the FEF 25-75% is only 30% predicted. There was excellent reversibility with bronchodilators in the distal airways; however, there is no significant reversibility with   bronchodilators in the large airways. Lung volumes were performed by   body plethysmography and are consistent with evidence of significant   restrictive disease. The DLCO is severely reduced but does correct   when considering alveolar ventilation. Airway resistance was performed by plethysmography and is elevated. The flow volume loops are consistent with evidence of restrictive   physiology. Clinical correlation is advised.           Saeid Hess MD      SR / DV   D:  03/10/2017   14:43   T:  03/10/2017   22:11   Job #:  [id number]

## 2017-03-11 NOTE — PROCEDURES
[facility] [place] [facility]   [address] [facility], [address] [facility]   [address] [facility]       Name:  Marsha Bonds   MR#:  [id number]   :  1955   Account #:  [de-identified]    Date of Procedure:  2017   Date of Adm:  2017       CLINICAL DIAGNOSIS/INDICATIONS:      INTERPRETATION:      6 MINUTE WALK TEST RESULTS     INDICATIONS FOR PROCEDURE: Dyspnea. The initial O2 saturation on room air was 82%, with a heart rate resting   at 74 beats per minute. The patient was then titrated on supplemental   O2 at 2 Liters nasal cannula, at 1 minute, the saturation came up to   86% with a heart rate of 81 beats per minute. At 2 minutes, the   saturation came to 89%, O2 was raised to 4 Liters nasal cannula, heart   rate was at 80 beats per minute. Saturations then came up above 90%   for the remainder of the 4 minutes of the test. In the end, O2 saturation   was 93% after 6 minutes on 5 Liters nasal cannula, with a heart rate of   79 beats per minute. After resting, the O2 saturation on 5 Liters nasal   cannula came to 95%, with a heart rate of 69 beats per minute. There   is evidence of exertional hypoxia. The patient will need to be placed on   continuous supplemental O2, with 2 Liters nasal cannula at rest and up   to 5 Liters nasal cannula with exertion.         MD Yessica García / Estrella Portillo   D:  03/10/2017   14:47   T:  03/10/2017   22:20   Job #:  [id number]

## 2017-03-17 ENCOUNTER — PATIENT OUTREACH (OUTPATIENT)
Dept: INTERNAL MEDICINE CLINIC | Age: 62
End: 2017-03-17

## 2017-03-17 NOTE — PROGRESS NOTES
Called patient for follow up. No answer. Left voicemail requesting call back. Goals      Attends follow-up appointments as directed. 3/9/17:  Patient seen by cardiology. Dr. Nimisha Mcmillan plan:  1. Positive Troponin: Could be respiratory failure related or due to CAD. Will check stress Nuc lexiscan study to r/o CAD. Continue ASA. 2. Peripheral Edema: Will start Lasix 40 BID for 3 days and then daily. KCL daily. Stop Maxzide  3. HTN: Stop Maxzide. Start Lasix   4. Dyslipidemia: She stopped taking Lipitor due to fear of interaction with her RA. She will clear this with her rheumatologist.   5. FU with me in 6months. Phone followup of test results. Currently scheduled to see PCP on 3/24/17.  Prevent complications post hospitalization.

## 2017-03-20 ENCOUNTER — TELEPHONE (OUTPATIENT)
Dept: INTERNAL MEDICINE CLINIC | Age: 62
End: 2017-03-20

## 2017-03-20 NOTE — TELEPHONE ENCOUNTER
Pt is returning a call, pt states that she can only get calls during the day at 10:00am, 11:30-12:00, and 2:00pm. Best contact number is [phone]      Message received & copied from [facility] after closing on 3/17/17

## 2017-03-22 ENCOUNTER — PATIENT OUTREACH (OUTPATIENT)
Dept: INTERNAL MEDICINE CLINIC | Age: 62
End: 2017-03-22

## 2017-03-22 NOTE — PROGRESS NOTES
Patient attempted to return NN's call from 3/20/17, but can only be reached at certain times (see telephone encounter). Attempted to call patient for follow up. No answer. VMB not accepting voicemail. 2:20 PM.  Patient returned Nurse Navigator's call. Goals      Attends follow-up appointments as directed. 3/9/17:  Patient seen by cardiology. Dr. Ange Rich plan:  1. Positive Troponin: Could be respiratory failure related or due to CAD. Will check stress Nuc lexiscan study to r/o CAD. Continue ASA. 2. Peripheral Edema: Will start Lasix 40 BID for 3 days and then daily. KCL daily. Stop Maxzide  3. HTN: Stop Maxzide. Start Lasix   4. Dyslipidemia: She stopped taking Lipitor due to fear of interaction with her RA. She will clear this with her rheumatologist.   5. FU with me in 6months. Phone followup of test results. Currently scheduled to see PCP on 3/24/17.  Prevent complications post hospitalization. 3/22/17  Patient verbalized understanding of cardiology's instructions. Denies shortness of breath or leg swelling. Denies any other questions or concerns at this time. Patient to follow up with PCP on 3/24/17.

## 2017-03-24 ENCOUNTER — OFFICE VISIT (OUTPATIENT)
Dept: INTERNAL MEDICINE CLINIC | Age: 62
End: 2017-03-24

## 2017-03-24 ENCOUNTER — PATIENT OUTREACH (OUTPATIENT)
Dept: INTERNAL MEDICINE CLINIC | Age: 62
End: 2017-03-24

## 2017-03-24 VITALS
WEIGHT: 259 LBS | OXYGEN SATURATION: 95 % | DIASTOLIC BLOOD PRESSURE: 101 MMHG | HEIGHT: 66 IN | SYSTOLIC BLOOD PRESSURE: 151 MMHG | TEMPERATURE: 98.2 F | RESPIRATION RATE: 19 BRPM | HEART RATE: 70 BPM | BODY MASS INDEX: 41.62 KG/M2

## 2017-03-24 DIAGNOSIS — I10 HTN (HYPERTENSION), BENIGN: ICD-10-CM

## 2017-03-24 DIAGNOSIS — R60.9 EDEMA, UNSPECIFIED TYPE: ICD-10-CM

## 2017-03-24 DIAGNOSIS — J96.11 CHRONIC RESPIRATORY FAILURE WITH HYPOXIA (HCC): Primary | ICD-10-CM

## 2017-03-24 DIAGNOSIS — M05.79 RHEUMATOID ARTHRITIS INVOLVING MULTIPLE SITES WITH POSITIVE RHEUMATOID FACTOR (HCC): ICD-10-CM

## 2017-03-24 RX ORDER — METOPROLOL SUCCINATE 25 MG/1
25 TABLET, EXTENDED RELEASE ORAL DAILY
Qty: 90 TAB | Refills: 3 | Status: SHIPPED | OUTPATIENT
Start: 2017-03-24 | End: 2018-05-03 | Stop reason: SDUPTHER

## 2017-03-24 RX ORDER — POTASSIUM CHLORIDE 750 MG/1
CAPSULE, EXTENDED RELEASE ORAL
Qty: 90 CAP | Refills: 3 | Status: SHIPPED | OUTPATIENT
Start: 2017-03-24 | End: 2018-03-29

## 2017-03-24 RX ORDER — FUROSEMIDE 40 MG/1
TABLET ORAL
Qty: 90 TAB | Refills: 3 | Status: SHIPPED | OUTPATIENT
Start: 2017-03-24 | End: 2017-04-21

## 2017-03-24 NOTE — PROGRESS NOTES
Tamar Gibson is a 64 y.o. female who presents for follow up after hospitalization. Admitted 3/2-3/7. She was in respiratory failure with encephalopathy secondary to CO2 retention. Used BIPAP and improved. ECHO EF 65%. Dr. Judith Castañeda, cardiology, and recently stopped dyazide. Started on Lasix 40mg once a day and Toprol XL 25mg once a day. Reports BP normal at home. Less swelling, wearing compression hose. She was discharged on Advair and Duoneb. She stopped both inhalers. She has home oxygen, discharged on 4 liters. She is using 2.5-3 liters. Saw Dr. Fantasma Mabry in hospital, pulmonary. Saw a pulmonary/sleep specialist at [facility], Dr. David Terrell. She has a pulse oximeter. Told to reduce use of oxygen when at rest, if SpO2 is not below 88%. Off oxygen at rest, 89-90%. Reports that if up walking, she does not desat. To see Dr. Sudeep Callahan, pulmonary, after sleep test.     RA, sees Dr. Jeanette Ramirez, on [place]. Past Medical History:   Diagnosis Date    Hypertension     RA (rheumatoid arthritis) ([facility] [address]        Family History   Problem Relation Age of Onset    Heart Disease Father     Hypertension Father     Stroke Father     Heart Disease Mother     Hypertension Mother     Hypertension Sister     Hypertension Brother        Social History     Social History    Marital status:      Spouse name: N/A    Number of children: N/A    Years of education: N/A     Occupational History    Not on file. Social History Main Topics    Smoking status: Former Smoker    Smokeless tobacco: Not on file    Alcohol use Yes    Drug use: No    Sexual activity: Yes     Partners: Male     Other Topics Concern    Not on file     Social History Narrative       Current Outpatient Prescriptions on File Prior to Visit   Medication Sig Dispense Refill    loratadine (CLARITIN) 10 mg tablet Take 10 mg by mouth daily.  ergocalciferol (VITAMIN D2) 50,000 unit capsule Take 50,000 Units by mouth every seven (7) days.  On Friday Indications: VITAMIN D DEFICIENCY      ascorbic acid, vitamin C, (VITAMIN C) 250 mg tablet Take 250 mg by mouth daily.  vitamins  A,C,E-zinc-copper (OCUVITE PRESERVISION) [phone] unit-mg-unit tab tablet Take 1 Tab by mouth two (2) times a day.  EPINEPHrine (EPIPEN) 0.3 mg/0.3 mL (1:1,000) injection 0.3 mL by IntraMUSCular route once as needed. 2 pens 0.6 mL 0    ORENCIA 125 mg/mL syrg 125 mg by SubCUTAneous route every seven (7) days. On Saturday      cholecalciferol (VITAMIN D3) 1,000 unit tablet Take 2,000 Units by mouth daily. Except on Friday      albuterol-ipratropium (DUO-NEB) 2.5 mg-0.5 mg/3 ml nebu 3 mL by Nebulization route every six (6) hours as needed. 30 Nebule 0    atorvastatin (LIPITOR) 10 mg tablet Take 1 Tab by mouth daily. (Patient not taking: Reported on 3/9/2017) 30 Tab 0    fluticasone-salmeterol (ADVAIR DISKUS) 100-50 mcg/dose diskus inhaler Take 1 Puff by inhalation every twelve (12) hours. No current facility-administered medications on file prior to visit. Review of Systems  Pertinent items are noted in HPI. Objective:     Visit Vitals    BP (!) 151/101 (BP 1 Location: Left arm, BP Patient Position: Sitting)    Pulse 70    Temp 98.2 °F (36.8 °C) (Oral)    Resp 19    Ht 5' 6\" (1.676 m)    Wt 259 lb (117.5 kg)    LMP 02/23/2013    SpO2 95%    BMI 41.8 kg/m2     Gen: well appearing female no SOB at rest.  Wearing oxygen. Resp:  No wheezing, no rhonchi, no rales. CV:  RRR, normal S1S2, no murmur. GI: soft, nontender, without masses. No hepatosplenomegaly. Extrem:  +2 pulses, +1 edema, warm distally      Assessment/Plan:     1. Chronic respiratory failure with hypoxia (HCC)-continues on oxygen. She stopped all inhalers on her own. Follow up with pulmonary      2. HTN (hypertension), benign- Recommend following a lower sodium diet and regular cardiovascular exercise. Blood pressure goal is less than 130/85 on average.   Advised compliance with blood pressure medication and regular follow up. - CBC WITH AUTOMATED DIFF  - METABOLIC PANEL, COMPREHENSIVE    3. Rheumatoid arthritis involving multiple sites with positive rheumatoid factor (HCC)-follow up with rheumatology    - SED RATE (ESR)  - CRP, HIGH SENSITIVITY    4. Edema, unspecified type- Recommend low salt diet and elevate legs. Use diuretic as directed. - MAGNESIUM  - METABOLIC PANEL, COMPREHENSIVE    Follow-up Disposition:  Return for follow up on medications 3 months and as needed.  Jalen Christian MD

## 2017-03-24 NOTE — PROGRESS NOTES
Goals      Attends follow-up appointments as directed. 3/24/17:  Reviewed chart. Patient attended HFU with Dr. Summer Singh on 3/24/17.    3/9/17:  Patient seen by cardiology. Dr. Blum Neighbor plan:  1. Positive Troponin: Could be respiratory failure related or due to CAD. Will check stress Nuc lexiscan study to r/o CAD. Continue ASA. 2. Peripheral Edema: Will start Lasix 40 BID for 3 days and then daily. KCL daily. Stop Maxzide  3. HTN: Stop Maxzide. Start Lasix   4. Dyslipidemia: She stopped taking Lipitor due to fear of interaction with her RA. She will clear this with her rheumatologist.   5. FU with me in 6months. Phone followup of test results. Currently scheduled to see PCP on 3/24/17.  Prevent complications post hospitalization. 3/22/17  Patient verbalized understanding of cardiology's instructions. Denies shortness of breath or leg swelling. Denies any other questions or concerns at this time. Patient to follow up with PCP on 3/24/17.

## 2017-03-24 NOTE — PROGRESS NOTES
Reviewed record in preparation for visit and have obtained necessary documentation. Identified pt with two pt identifiers(name and ). Chief Complaint   Patient presents with   [facility] Follow Up     admitted to [facility] on 3/2/17 d/c on 3/7/17 with diagnosis of Acute respiratory failure with hypoxia / hypercarbia; Acute encephalopathy, Elevated Troponin, Pericardial Effusion. Coordination of Care Questionnaire:  :     1) Have you been to an emergency room, urgent care clinic since your last visit? yes 3/2/17 [facility] ED  Hospitalized since your last visit?  yes         [facility] 3/2/17    2) Have you seen or consulted any other health care providers outside of [facility] since your last visit? no  (Include any pap smears or colon screenings in this section.)

## 2017-03-24 NOTE — MR AVS SNAPSHOT
Visit Information Date & Time Provider Department Dept. Phone Encounter #  
 3/24/2017 10:30 AM Allie Cloud, [address] [place] [address],[address] [phone] [id number] Follow-up Instructions Return for follow up on medications 3 months and as needed. .  
  
Your Appointments 9/8/2017  2:00 PM  
ESTABLISHED PATIENT with Markell Means MD  
[address] ([address] [facility]) Appt Note: 6 mo fup  
 [address] [facility] [address]  
[address] [facility] [address] [address] Upcoming Health Maintenance Date Due Hepatitis C Screening 1955 DTaP/Tdap/Td series (1 - Tdap) 11/10/1976 PAP AKA CERVICAL CYTOLOGY 11/10/1976 BREAST CANCER SCRN MAMMOGRAM 11/10/2005 FOBT Q 1 YEAR AGE 50-75 11/10/2005 ZOSTER VACCINE AGE 60> 11/10/2015 INFLUENZA AGE 9 TO ADULT 8/1/2016 Allergies as of 3/24/2017  Review Complete On: 3/24/2017 By: Allie Cloud MD  
  
 Severity Noted Reaction Type Reactions Bee Sting [Sting, Bee] High 03/23/2016    Anaphylaxis Codeine High 03/23/2016    Hives, Shortness of Breath  
 bp drop Methotrexate  03/23/2016    Nausea and Vomiting Penicillins  03/23/2016    Hives, Shortness of Breath  
 bp drop Current Immunizations  Never Reviewed No immunizations on file. Not reviewed this visit You Were Diagnosed With   
  
 Codes Comments Chronic respiratory failure with hypoxia (HCC)    -  Primary ICD-10-CM: J96.11 
ICD-9-CM: 518.83, 799.02   
 HTN (hypertension), benign     ICD-10-CM: I10 
ICD-9-CM: 401.1 Rheumatoid arthritis involving multiple sites with positive rheumatoid factor (HCC)     ICD-10-CM: M05.79 ICD-9-CM: 714.0 Edema, unspecified type     ICD-10-CM: R60.9 ICD-9-CM: 561. 3 Vitals BP Pulse Temp Resp Height(growth percentile) Weight(growth percentile) (!) 151/101 (BP 1 Location: Left arm, BP Patient Position: Sitting) 70 98.2 °F (36.8 °C) (Oral) 19 5' 6\" (1.676 m) 259 lb (117.5 kg) LMP SpO2 BMI OB Status Smoking Status 02/23/2013 95% 41.8 kg/m2 Postmenopausal Former Smoker Vitals History BMI and BSA Data Body Mass Index Body Surface Area  
 41.8 kg/m 2 2.34 m 2 Preferred Pharmacy Pharmacy Name Phone [address] [facility] [phone] Your Updated Medication List  
  
   
This list is accurate as of: 3/24/17 11:45 AM.  Always use your most recent med list.  
  
  
  
  
 ADVAIR DISKUS 100-50 mcg/dose diskus inhaler Generic drug:  fluticasone-salmeterol Take 1 Puff by inhalation every twelve (12) hours. albuterol-ipratropium 2.5 mg-0.5 mg/3 ml Nebu Commonly known as:  DUO-NEB  
3 mL by Nebulization route every six (6) hours as needed. atorvastatin 10 mg tablet Commonly known as:  LIPITOR Take 1 Tab by mouth daily. cholecalciferol 1,000 unit tablet Commonly known as:  VITAMIN D3 Take 2,000 Units by mouth daily. Except on Friday CLARITIN 10 mg tablet Generic drug:  loratadine Take 10 mg by mouth daily. EPINEPHrine 0.3 mg/0.3 mL injection Commonly known as:  EPIPEN  
0.3 mL by IntraMUSCular route once as needed. 2 pens  
  
 furosemide 40 mg tablet Commonly known as:  LASIX Take 1 tab once a day  
  
 metoprolol succinate 25 mg XL tablet Commonly known as:  TOPROL-XL Take 1 Tab by mouth daily. Ocuvite PreserVision [phone] unit-mg-unit Tab tablet Generic drug:  vitamins  A,C,E-zinc-copper Take 1 Tab by mouth two (2) times a day. ORENCIA 125 mg/mL Syrg Generic drug:  abatacept 125 mg by SubCUTAneous route every seven (7) days. On Saturday  
  
 potassium chloride SA 10 mEq capsule Commonly known as:  Mendoza Risser Take 1 cap daily VITAMIN C 250 mg tablet Generic drug:  ascorbic acid (vitamin C) Take 250 mg by mouth daily. VITAMIN D2 50,000 unit capsule Generic drug:  ergocalciferol Take 50,000 Units by mouth every seven (7) days. On Friday   Indications: VITAMIN D DEFICIENCY Prescriptions Sent to Pharmacy Refills  
 furosemide (LASIX) 40 mg tablet 3 Sig: Take 1 tab once a day Class: Normal  
 Pharmacy: [address], [address] Ph #: [phone]  
 potassium chloride SA (MICRO-K) 10 mEq capsule 3 Sig: Take 1 cap daily Class: Normal  
 Pharmacy: [address], [address] Ph #: [phone]  
 metoprolol succinate (TOPROL-XL) 25 mg XL tablet 3 Sig: Take 1 Tab by mouth daily. Class: Normal  
 Pharmacy: [address], [address] Ph #: [phone] Route: Oral  
  
We Performed the Following CBC WITH AUTOMATED DIFF [98770 CPT(R)] CRP, HIGH SENSITIVITY [60845 CPT(R)] MAGNESIUM [id number] CPT(R)] METABOLIC PANEL, COMPREHENSIVE [63436 CPT(R)] SED RATE (ESR) [id number] CPT(R)] Follow-up Instructions Return for follow up on medications 3 months and as needed. .  
  
  
Introducing [facility]! Dear Jacques Pantoja: Thank you for requesting a [employer] account. Our records indicate that you already have an active [employer] account. You can access your account anytime at [email or web address] Did you know that you can access your hospital and ER discharge instructions at any time in [employer]? You can also review all of your test results from your hospital stay or ER visit. Additional Information If you have questions, please visit the Frequently Asked Questions section of the [employer] website at [email or web address]/. Remember, [employer] is NOT to be used for urgent needs. For medical emergencies, dial 911. Now available from your iPhone and Android! Please provide this summary of care documentation to your next provider. Your primary care clinician is listed as Luis Choi. If you have any questions after today's visit, please call [phone].

## 2017-03-25 LAB
ALBUMIN SERPL-MCNC: 3.6 G/DL (ref 3.6–4.8)
ALBUMIN/GLOB SERPL: 1.4 {RATIO} (ref 1.2–2.2)
ALP SERPL-CCNC: 93 IU/L (ref 39–117)
ALT SERPL-CCNC: 24 IU/L (ref 0–32)
AST SERPL-CCNC: 20 IU/L (ref 0–40)
BASOPHILS # BLD AUTO: 0 X10E3/UL (ref 0–0.2)
BASOPHILS NFR BLD AUTO: 0 %
BILIRUB SERPL-MCNC: 0.3 MG/DL (ref 0–1.2)
BUN SERPL-MCNC: 13 MG/DL (ref 8–27)
BUN/CREAT SERPL: 21 (ref 11–26)
CALCIUM SERPL-MCNC: 9 MG/DL (ref 8.7–10.3)
CHLORIDE SERPL-SCNC: 100 MMOL/L (ref 96–106)
CO2 SERPL-SCNC: 32 MMOL/L (ref 18–29)
CREAT SERPL-MCNC: 0.63 MG/DL (ref 0.57–1)
CRP SERPL HS-MCNC: 3.92 MG/L (ref 0–3)
EOSINOPHIL # BLD AUTO: 0.3 X10E3/UL (ref 0–0.4)
EOSINOPHIL NFR BLD AUTO: 4 %
ERYTHROCYTE [DISTWIDTH] IN BLOOD BY AUTOMATED COUNT: 15.2 % (ref 12.3–15.4)
ERYTHROCYTE [SEDIMENTATION RATE] IN BLOOD BY WESTERGREN METHOD: 42 MM/HR (ref 0–40)
GLOBULIN SER CALC-MCNC: 2.6 G/DL (ref 1.5–4.5)
GLUCOSE SERPL-MCNC: 82 MG/DL (ref 65–99)
HCT VFR BLD AUTO: 46.4 % (ref 34–46.6)
HGB BLD-MCNC: 14.9 G/DL (ref 11.1–15.9)
IMM GRANULOCYTES # BLD: 0 X10E3/UL (ref 0–0.1)
IMM GRANULOCYTES NFR BLD: 0 %
LYMPHOCYTES # BLD AUTO: 2.5 X10E3/UL (ref 0.7–3.1)
LYMPHOCYTES NFR BLD AUTO: 36 %
MAGNESIUM SERPL-MCNC: 2.1 MG/DL (ref 1.6–2.3)
MCH RBC QN AUTO: 28.1 PG (ref 26.6–33)
MCHC RBC AUTO-ENTMCNC: 32.1 G/DL (ref 31.5–35.7)
MCV RBC AUTO: 88 FL (ref 79–97)
MONOCYTES # BLD AUTO: 0.6 X10E3/UL (ref 0.1–0.9)
MONOCYTES NFR BLD AUTO: 9 %
NEUTROPHILS # BLD AUTO: 3.6 X10E3/UL (ref 1.4–7)
NEUTROPHILS NFR BLD AUTO: 51 %
PLATELET # BLD AUTO: 249 X10E3/UL (ref 150–379)
POTASSIUM SERPL-SCNC: 4.6 MMOL/L (ref 3.5–5.2)
PROT SERPL-MCNC: 6.2 G/DL (ref 6–8.5)
RBC # BLD AUTO: 5.3 X10E6/UL (ref 3.77–5.28)
SODIUM SERPL-SCNC: 145 MMOL/L (ref 134–144)
WBC # BLD AUTO: 6.9 X10E3/UL (ref 3.4–10.8)

## 2017-04-01 NOTE — PROGRESS NOTES
Notify patient labs show elevated tests for inflammation. Normal sugar, kidney and liver tests. Normal blood counts. Follow up as scheduled in June and as needed. I am sending labs to her specialists.

## 2017-04-05 ENCOUNTER — PATIENT OUTREACH (OUTPATIENT)
Dept: INTERNAL MEDICINE CLINIC | Age: 62
End: 2017-04-05

## 2017-04-05 ENCOUNTER — TELEPHONE (OUTPATIENT)
Dept: INTERNAL MEDICINE CLINIC | Age: 62
End: 2017-04-05

## 2017-04-05 NOTE — TELEPHONE ENCOUNTER
Pt states she can not get calls at work. Please call pt on Friday as she is not at work that day this week.

## 2017-04-05 NOTE — TELEPHONE ENCOUNTER
Returned call and left message for patient. Advised for her to call back on Friday if she would like to discuss labs at that time.

## 2017-04-05 NOTE — PROGRESS NOTES
To the best of this writer's knowledge, pt has had no further hospital or ED admissions. Pt attended hospital follow up. Further follow up scheduled. Goals met and completed. Navigation type closed.  Post hospital episode resolved

## 2017-04-05 NOTE — TELEPHONE ENCOUNTER
Patient states she's returning your call. Patient states she prefers to speak with the doctor directly instead of the nurse.  Thank you

## 2017-04-06 NOTE — TELEPHONE ENCOUNTER
Pt called returning a cb from the office.  Pt number [phone]       Message received & copied from [facility]

## 2017-04-07 ENCOUNTER — PATIENT OUTREACH (OUTPATIENT)
Dept: INTERNAL MEDICINE CLINIC | Age: 62
End: 2017-04-07

## 2017-04-07 NOTE — TELEPHONE ENCOUNTER
Returned call to patient. Notified of lab results per Dr. Alyssa Christian note. Patient states her labs are abnormal and she has questions regarding several of them. Advised patient I would be more than happy to send her questions to Dr. Myron Paez to clarify any concerns pt may have. Patient became very upset and agitated. Stated she previously requested that Dr. Myron Paez return her call her back herself, and that she cannot take phone calls Monday - Thursday 8-4:30pm. Explained to patient per office policy we must return phone calls within 24 hours and document that we have done so. Also, explained all phone calls are triaged through the nurses and then sent to the provider for orders/ recommendations. Patient still very upset and using profanity during phone call. She requested I make a note in her chart that states \"Do not call during the week, only Friday's. \" I advised patient there was not a way to do that in the chart where it would be visible every time her chart was opened. Also requested I removed her mobile number from her chart. I advised I could do that, but we would still need a contact phone number. She stated she did not want us to have any number. Patient requested to speak with . Advised she was in a meeting and I could have ARENDAL call as soon as possible. She was not satisfied with this response and hung up the phone.

## 2017-04-21 ENCOUNTER — HOSPITAL ENCOUNTER (EMERGENCY)
Age: 62
Discharge: HOME | End: 2017-04-21
Attending: EMERGENCY MEDICINE
Payer: COMMERCIAL

## 2017-04-21 VITALS
HEIGHT: 66 IN | BODY MASS INDEX: 42.8 KG/M2 | OXYGEN SATURATION: 96 % | RESPIRATION RATE: 18 BRPM | TEMPERATURE: 98.3 F | WEIGHT: 266.32 LBS | DIASTOLIC BLOOD PRESSURE: 85 MMHG | SYSTOLIC BLOOD PRESSURE: 188 MMHG | HEART RATE: 78 BPM

## 2017-04-21 DIAGNOSIS — M79.89 LEG SWELLING: Primary | ICD-10-CM

## 2017-04-21 LAB
ALBUMIN SERPL BCP-MCNC: 3 G/DL (ref 3.5–5)
ALBUMIN/GLOB SERPL: 0.8 {RATIO} (ref 1.1–2.2)
ALP SERPL-CCNC: 84 U/L (ref 45–117)
ALT SERPL-CCNC: 51 U/L (ref 12–78)
ANION GAP BLD CALC-SCNC: 7 MMOL/L (ref 5–15)
AST SERPL W P-5'-P-CCNC: 32 U/L (ref 15–37)
BASOPHILS # BLD AUTO: 0 K/UL (ref 0–0.1)
BASOPHILS # BLD: 0 % (ref 0–1)
BILIRUB SERPL-MCNC: 0.3 MG/DL (ref 0.2–1)
BUN SERPL-MCNC: 17 MG/DL (ref 6–20)
BUN/CREAT SERPL: 29 (ref 12–20)
CALCIUM SERPL-MCNC: 8.7 MG/DL (ref 8.5–10.1)
CHLORIDE SERPL-SCNC: 103 MMOL/L (ref 97–108)
CO2 SERPL-SCNC: 33 MMOL/L (ref 21–32)
CREAT SERPL-MCNC: 0.59 MG/DL (ref 0.55–1.02)
D DIMER PPP FEU-MCNC: 0.71 MG/L FEU (ref 0–0.65)
DIFFERENTIAL METHOD BLD: ABNORMAL
EOSINOPHIL # BLD: 0.3 K/UL (ref 0–0.4)
EOSINOPHIL NFR BLD: 5 % (ref 0–7)
ERYTHROCYTE [DISTWIDTH] IN BLOOD BY AUTOMATED COUNT: 15.2 % (ref 11.5–14.5)
GLOBULIN SER CALC-MCNC: 3.9 G/DL (ref 2–4)
GLUCOSE SERPL-MCNC: 110 MG/DL (ref 65–100)
HCT VFR BLD AUTO: 45.1 % (ref 35–47)
HGB BLD-MCNC: 14.1 G/DL (ref 11.5–16)
LYMPHOCYTES # BLD AUTO: 35 % (ref 12–49)
LYMPHOCYTES # BLD: 2.1 K/UL (ref 0.8–3.5)
MCH RBC QN AUTO: 27.9 PG (ref 26–34)
MCHC RBC AUTO-ENTMCNC: 31.3 G/DL (ref 30–36.5)
MCV RBC AUTO: 89.1 FL (ref 80–99)
MONOCYTES # BLD: 0.6 K/UL (ref 0–1)
MONOCYTES NFR BLD AUTO: 9 % (ref 5–13)
NEUTS SEG # BLD: 3 K/UL (ref 1.8–8)
NEUTS SEG NFR BLD AUTO: 51 % (ref 32–75)
PLATELET # BLD AUTO: 217 K/UL (ref 150–400)
POTASSIUM SERPL-SCNC: 3.8 MMOL/L (ref 3.5–5.1)
PROT SERPL-MCNC: 6.9 G/DL (ref 6.4–8.2)
RBC # BLD AUTO: 5.06 M/UL (ref 3.8–5.2)
SODIUM SERPL-SCNC: 143 MMOL/L (ref 136–145)
TROPONIN I SERPL-MCNC: 0.04 NG/ML
WBC # BLD AUTO: 5.9 K/UL (ref 3.6–11)

## 2017-04-21 PROCEDURE — 96374 THER/PROPH/DIAG INJ IV PUSH: CPT

## 2017-04-21 PROCEDURE — 84484 ASSAY OF TROPONIN QUANT: CPT | Performed by: EMERGENCY MEDICINE

## 2017-04-21 PROCEDURE — 74011000250 HC RX REV CODE- 250: Performed by: EMERGENCY MEDICINE

## 2017-04-21 PROCEDURE — 36415 COLL VENOUS BLD VENIPUNCTURE: CPT | Performed by: EMERGENCY MEDICINE

## 2017-04-21 PROCEDURE — 85025 COMPLETE CBC W/AUTO DIFF WBC: CPT | Performed by: EMERGENCY MEDICINE

## 2017-04-21 PROCEDURE — 85379 FIBRIN DEGRADATION QUANT: CPT | Performed by: EMERGENCY MEDICINE

## 2017-04-21 PROCEDURE — 80053 COMPREHEN METABOLIC PANEL: CPT | Performed by: EMERGENCY MEDICINE

## 2017-04-21 PROCEDURE — 99284 EMERGENCY DEPT VISIT MOD MDM: CPT

## 2017-04-21 RX ORDER — FUROSEMIDE 40 MG/1
TABLET ORAL
Qty: 30 TAB | Refills: 0 | Status: SHIPPED | OUTPATIENT
Start: 2017-04-21 | End: 2018-03-29

## 2017-04-21 RX ORDER — FUROSEMIDE 40 MG/1
TABLET ORAL
Qty: 30 TAB | Refills: 0 | Status: SHIPPED | OUTPATIENT
Start: 2017-04-21 | End: 2017-04-21

## 2017-04-21 RX ORDER — BUMETANIDE 0.25 MG/ML
1 INJECTION INTRAMUSCULAR; INTRAVENOUS
Status: COMPLETED | OUTPATIENT
Start: 2017-04-21 | End: 2017-04-21

## 2017-04-21 RX ADMIN — BUMETANIDE 1 MG: 0.25 INJECTION, SOLUTION INTRAMUSCULAR; INTRAVENOUS at 10:14

## 2017-04-21 NOTE — ED TRIAGE NOTES
Patient in ER with complaint of \"for the past 3 or 4 week off and on my legs have been swollen and red and I think I may have cellulitis. \" Patient reports that she take lasix and the swelling remains the same night or day. Patient reports low grade fever last night. Patient on oxygen at home at 4L oxygen via nasal cannula. Patient placed on 4L oxygen via nasal cannula in triage.

## 2017-04-21 NOTE — ED NOTES
Pt given discharge instructions by Dr Rochelle Manzano she verbalizes an understanding, pt stable at time of discharge ambulates to lobby with home O2

## 2017-04-21 NOTE — ED PROVIDER NOTES
Patient is a 64 y.o. female presenting with lower extremity edema. The history is provided by the patient. Leg Swelling    This is a chronic problem. Episode onset: since last month. The problem occurs constantly. The problem has been gradually worsening. The pain is present in the left upper leg and left lower leg. Quality: burning. The pain is at a severity of 7/10. Pertinent negatives include no back pain and no neck pain. The symptoms are aggravated by palpation and standing. There has been no history of extremity trauma. Past Medical History:   Diagnosis Date    Hypertension     RA (rheumatoid arthritis) ([facility] [address]        Past Surgical History:   Procedure Laterality Date    HX GYN      R vulvectomy         Family History:   Problem Relation Age of Onset    Heart Disease Father     Hypertension Father     Stroke Father     Heart Disease Mother     Hypertension Mother     Hypertension Sister     Hypertension Brother        Social History     Social History    Marital status:      Spouse name: N/A    Number of children: N/A    Years of education: N/A     Occupational History    Not on file. Social History Main Topics    Smoking status: Former Smoker    Smokeless tobacco: Not on file    Alcohol use Yes    Drug use: No    Sexual activity: Yes     Partners: Male     Other Topics Concern    Not on file     Social History Narrative         ALLERGIES: Bee sting [sting, bee]; Codeine; Methotrexate; and Penicillins    Review of Systems   Constitutional: Negative for chills and fever. Respiratory: Positive for shortness of breath. Negative for chest tightness. Cardiovascular: Positive for leg swelling. Negative for chest pain. Gastrointestinal: Negative for abdominal pain, nausea and vomiting. Musculoskeletal: Negative for back pain and neck pain. All other systems reviewed and are negative.       Vitals:    04/21/17 0917   BP: (!) 184/99   Resp: 16   Temp: 98.3 °F (36.8 °C) SpO2: 94%   Weight: 120.8 kg (266 lb 5.1 oz)   Height: 5' 6\" (1.676 m)            Physical Exam   Constitutional: She is oriented to person, place, and time. She appears well-developed and well-nourished. She appears distressed. HENT:   Head: Normocephalic and atraumatic. Eyes: Conjunctivae and EOM are normal.   Neck: Normal range of motion. Cardiovascular: Normal rate, regular rhythm, normal heart sounds and intact distal pulses. No murmur heard. Pulmonary/Chest: Breath sounds normal. No stridor. She is in respiratory distress. She has no wheezes. She has no rales. Wearing her NC oxygen   Abdominal: Soft. Bowel sounds are normal. There is no tenderness. Musculoskeletal: Normal range of motion. She exhibits edema. She exhibits no tenderness. Bilateral LE edema from knees down   Neurological: She is alert and oriented to person, place, and time. No cranial nerve deficit. Skin: Skin is warm and dry. She is not diaphoretic. No erythema. Psychiatric: She has a normal mood and affect. Nursing note and vitals reviewed. MDM  Number of Diagnoses or Management Options  Leg swelling:   Diagnosis management comments: Patient with leg swelling and edema - check labs and give bumex and re-eval.  Patient declined CXR, no clinical suspicion on exam for cellulitis    1045am - discussed getting doppler of lower extremities to r/o DVT as patient's swelling is worse since recent hospitalization and she continues on home oxygen, patient advised of the increased risk for DVT and PE. Patient declined the dopplers, will d/c home to increase her lasix and to f/u with her PCP.   Will increase her lasix temporarily        Amount and/or Complexity of Data Reviewed  Clinical lab tests: ordered and reviewed    Patient Progress  Patient progress: stable    ED Course       Procedures

## 2017-04-21 NOTE — DISCHARGE INSTRUCTIONS
We hope that we have addressed all of your medical concerns. The examination and treatment you received in the Emergency Department were for an emergent problem and were not intended as complete care. It is important that you follow up with your healthcare provider(s) for ongoing care. If your symptoms worsen or do not improve as expected, and you are unable to reach your usual health care provider(s), you should return to the Emergency Department. Today's healthcare is undergoing tremendous change, and patient satisfaction surveys are one of the many tools to assess the quality of medical care. You may receive a survey from the [employer] organization regarding your experience in the Emergency Department. I hope that your experience has been completely positive, particularly the medical care that I provided. As such, please participate in the survey; anything less than excellent does not meet my expectations or intentions. [facility] and [address] [place] [address] participate in nationally recognized quality of care measures. If your blood pressure is greater than 120/80, as reported below, we urge that you seek medical care to address the potential of high blood pressure, commonly known as hypertension. Hypertension can be hereditary or can be caused by certain medical conditions, pain, stress, or \"white coat syndrome. \"       Please make an appointment with your health care provider(s) for follow up of your Emergency Department visit. VITALS:   Patient Vitals for the past 8 hrs:   Temp Resp BP SpO2   04/21/17 0917 98.3 °F (36.8 °C) 16 (!) 184/99 94 %          Thank you for allowing us to provide you with medical care today. We realize that you have many choices for your emergency care needs. Please choose us in the future for any continued health care needs. Maxx Kaufman, [address] [place] [address] [address]   Office: [phone]            Recent Results (from the past 24 hour(s))   TROPONIN I    Collection Time: 04/21/17 10:01 AM   Result Value Ref Range    Troponin-I, Qt. 0.04 <0.08 ng/mL   D DIMER    Collection Time: 04/21/17 10:01 AM   Result Value Ref Range    D-dimer 0.71 (H) 0.00 - 0.65 mg/L FEU   METABOLIC PANEL, COMPREHENSIVE    Collection Time: 04/21/17 10:01 AM   Result Value Ref Range    Sodium 143 136 - 145 mmol/L    Potassium 3.8 3.5 - 5.1 mmol/L    Chloride 103 97 - 108 mmol/L    CO2 33 (H) 21 - 32 mmol/L    Anion gap 7 5 - 15 mmol/L    Glucose 110 (H) 65 - 100 mg/dL    BUN 17 6 - 20 MG/DL    Creatinine 0.59 0.55 - 1.02 MG/DL    BUN/Creatinine ratio 29 (H) 12 - 20      GFR est AA >60 >60 ml/min/1.73m2    GFR est non-AA >60 >60 ml/min/1.73m2    Calcium 8.7 8.5 - 10.1 MG/DL    Bilirubin, total 0.3 0.2 - 1.0 MG/DL    ALT (SGPT) 51 12 - 78 U/L    AST (SGOT) 32 15 - 37 U/L    Alk. phosphatase 84 45 - 117 U/L    Protein, total 6.9 6.4 - 8.2 g/dL    Albumin 3.0 (L) 3.5 - 5.0 g/dL    Globulin 3.9 2.0 - 4.0 g/dL    A-G Ratio 0.8 (L) 1.1 - 2.2     CBC WITH AUTOMATED DIFF    Collection Time: 04/21/17 10:01 AM   Result Value Ref Range    WBC 5.9 3.6 - 11.0 K/uL    RBC 5.06 3.80 - 5.20 M/uL    HGB 14.1 11.5 - 16.0 g/dL    HCT 45.1 35.0 - 47.0 %    MCV 89.1 80.0 - 99.0 FL    MCH 27.9 26.0 - 34.0 PG    MCHC 31.3 30.0 - 36.5 g/dL    RDW 15.2 (H) 11.5 - 14.5 %    PLATELET 647 054 - 730 K/uL    NEUTROPHILS 51 32 - 75 %    LYMPHOCYTES 35 12 - 49 %    MONOCYTES 9 5 - 13 %    EOSINOPHILS 5 0 - 7 %    BASOPHILS 0 0 - 1 %    ABS. NEUTROPHILS 3.0 1.8 - 8.0 K/UL    ABS. LYMPHOCYTES 2.1 0.8 - 3.5 K/UL    ABS. MONOCYTES 0.6 0.0 - 1.0 K/UL    ABS. EOSINOPHILS 0.3 0.0 - 0.4 K/UL    ABS.  BASOPHILS 0.0 0.0 - 0.1 K/UL    DF AUTOMATED

## 2017-05-26 ENCOUNTER — OFFICE VISIT (OUTPATIENT)
Dept: INTERNAL MEDICINE CLINIC | Age: 62
End: 2017-05-26

## 2017-05-26 VITALS
DIASTOLIC BLOOD PRESSURE: 91 MMHG | WEIGHT: 273.8 LBS | HEIGHT: 65 IN | RESPIRATION RATE: 20 BRPM | BODY MASS INDEX: 45.62 KG/M2 | HEART RATE: 73 BPM | TEMPERATURE: 98.5 F | SYSTOLIC BLOOD PRESSURE: 186 MMHG | OXYGEN SATURATION: 98 %

## 2017-05-26 DIAGNOSIS — Z11.59 NEED FOR HEPATITIS C SCREENING TEST: ICD-10-CM

## 2017-05-26 DIAGNOSIS — M05.79 RHEUMATOID ARTHRITIS INVOLVING MULTIPLE SITES WITH POSITIVE RHEUMATOID FACTOR (HCC): Primary | ICD-10-CM

## 2017-05-26 DIAGNOSIS — I10 HTN (HYPERTENSION), BENIGN: ICD-10-CM

## 2017-05-26 DIAGNOSIS — E66.01 OBESITY, MORBID, BMI 40.0-49.9 (HCC): ICD-10-CM

## 2017-05-26 DIAGNOSIS — R60.0 BILATERAL LEG EDEMA: ICD-10-CM

## 2017-05-26 DIAGNOSIS — F41.8 ANXIETY ABOUT HEALTH: ICD-10-CM

## 2017-05-26 DIAGNOSIS — R06.02 SOB (SHORTNESS OF BREATH): ICD-10-CM

## 2017-05-26 NOTE — MR AVS SNAPSHOT
Visit Information Date & Time Provider Department Dept. Phone Encounter #  
 5/26/2017  3:00 PM Jessie Goodwin MD [address] [place] [address] Internal Medicine [phone] [id number] Follow-up Instructions Return in about 6 weeks (around 7/7/2017) for Physical - 30 minute appointment. Your Appointments 6/2/2017 10:00 AM  
New Patient with Caridad Watters MD  
[facility] ([facility]) Appt Note: appt yemi'd by Montez Hyatt per Jessie Goodwin MD for hypertension, leg edema pt seen by Dr. Melo Moscoso however she lives on this side of [facility] req'd appt w/physician at [address]  [address] [place] [address]  
[address] [facility]  
  
    
 6/23/2017 10:15 AM  
ROUTINE CARE with Nasim Pate MD  
[facility]) Appt Note: 3 month follow up [facility] [address] [address]  
[address] [place] [address] [address] [place] [address] Upcoming Health Maintenance Date Due Hepatitis C Screening 1955 DTaP/Tdap/Td series (1 - Tdap) 11/10/1976 PAP AKA CERVICAL CYTOLOGY 11/10/1976 BREAST CANCER SCRN MAMMOGRAM 11/10/2005 FOBT Q 1 YEAR AGE 50-75 11/10/2005 ZOSTER VACCINE AGE 60> 11/10/2015 INFLUENZA AGE 9 TO ADULT 8/1/2017 Allergies as of 5/26/2017  Review Complete On: 5/26/2017 By: Jessie Goodwin MD  
  
 Severity Noted Reaction Type Reactions Bee Sting [Sting, Bee] High 03/23/2016    Anaphylaxis Codeine High 03/23/2016    Hives, Shortness of Breath  
 bp drop Methotrexate  03/23/2016    Nausea and Vomiting Penicillins  03/23/2016    Hives, Shortness of Breath  
 bp drop Current Immunizations  Never Reviewed No immunizations on file. Not reviewed this visit You Were Diagnosed With   
  
 Codes Comments Rheumatoid arthritis involving multiple sites with positive rheumatoid factor (HCC)    -  Primary ICD-10-CM: M05.79 ICD-9-CM: 714.0   
 HTN (hypertension), benign     ICD-10-CM: I10 
ICD-9-CM: 401.1 SOB (shortness of breath)     ICD-10-CM: R06.02 
ICD-9-CM: 786.05 Anxiety about health     ICD-10-CM: F41.8 ICD-9-CM: 300.09 Bilateral leg edema     ICD-10-CM: R60.0 ICD-9-CM: 254. 3 Obesity, morbid, BMI 40.0-49.9 (HCC)     ICD-10-CM: E66.01 
ICD-9-CM: 278.01 Need for hepatitis C screening test     ICD-10-CM: Z11.59 
ICD-9-CM: V73.89 Vitals BP Pulse Temp Resp Height(growth percentile) Weight(growth percentile) (!) 186/91 (BP 1 Location: Left arm, BP Patient Position: Sitting) 73 98.5 °F (36.9 °C) (Oral) 20 5' 4.65\" (1.642 m) 273 lb 12.8 oz (124.2 kg) LMP SpO2 BMI OB Status Smoking Status 02/23/2013 98% 46.06 kg/m2 Postmenopausal Former Smoker Vitals History BMI and BSA Data Body Mass Index Body Surface Area 46.06 kg/m 2 2.38 m 2 Preferred Pharmacy Pharmacy Name Phone [address] [facility] [phone] Your Updated Medication List  
  
   
This list is accurate as of: 5/26/17  4:48 PM.  Always use your most recent med list.  
  
  
  
  
 ADVAIR DISKUS 100-50 mcg/dose diskus inhaler Generic drug:  fluticasone-salmeterol Take 1 Puff by inhalation every twelve (12) hours. albuterol-ipratropium 2.5 mg-0.5 mg/3 ml Nebu Commonly known as:  DUO-NEB  
3 mL by Nebulization route every six (6) hours as needed. atorvastatin 10 mg tablet Commonly known as:  LIPITOR Take 1 Tab by mouth daily. cholecalciferol 1,000 unit tablet Commonly known as:  VITAMIN D3 Take 2,000 Units by mouth daily. Except on Friday CLARITIN 10 mg tablet Generic drug:  loratadine Take 10 mg by mouth daily. EPINEPHrine 0.3 mg/0.3 mL injection Commonly known as:  Epimenio Ama  
 0.3 mL by IntraMUSCular route once as needed. 2 pens  
  
 furosemide 40 mg tablet Commonly known as:  LASIX Take 2 tabs once a day in the morning  Indications: Edema HAWTHORN PO Take 1 Tab by mouth daily. metoprolol succinate 25 mg XL tablet Commonly known as:  TOPROL-XL Take 1 Tab by mouth daily. Ocuvite PreserVision [phone] unit-mg-unit Tab tablet Generic drug:  vitamins  A,C,E-zinc-copper Take 1 Tab by mouth two (2) times a day. ORENCIA 125 mg/mL Syrg Generic drug:  abatacept 125 mg by SubCUTAneous route every seven (7) days. On Saturday  
  
 potassium chloride SA 10 mEq capsule Commonly known as:  Leonidas Mina Take 1 cap daily VITAMIN C 250 mg tablet Generic drug:  ascorbic acid (vitamin C) Take 250 mg by mouth daily. VITAMIN D2 50,000 unit capsule Generic drug:  ergocalciferol Take 50,000 Units by mouth every seven (7) days. On Friday   Indications: VITAMIN D DEFICIENCY We Performed the Following HEPATITIS C AB [52570 CPT(R)] LIPID PANEL [56541 CPT(R)] REFERRAL TO CARDIOLOGY [VQT64 Custom] REFERRAL TO PSYCHOLOGY [LFG37 Custom] REFERRAL TO WOUND CARE [PWU960 Custom] Comments:  
 Please evaluate patient for bilateral LE edema ** call after 4:30pm M-Thursday or on Friday- can't take calls at work SLEEP MEDICINE REFERRAL [JHI162 Custom] Comments:  
 Orders: 
Sleep Medicine Consult - Schedule patient for a sleep specialist consult. If appropriate, schedule patient for sleep study(s). Initiate treatment if needed. Forward correspondance to my office. ** call after 4:30pm M-Thursday or on Friday- can't take calls at work VITAMIN D, 25 HYDROXY [id number] CPT(R)] Follow-up Instructions Return in about 6 weeks (around 7/7/2017) for Physical - 30 minute appointment. Referral Information Referral ID Referred By Referred To [id number] [address] Phone: [phone] Fax: [phone] Visits Status Start Date End Date 1 New Request 5/26/17 5/26/18 If your referral has a status of pending review or denied, additional information will be sent to support the outcome of this decision. Referral ID Referred By Referred To  
 [id number] [place] [address] [place] [address] [address] Phone: [phone] Fax: [phone] Visits Status Start Date End Date 1 New Request 5/26/17 5/26/18 If your referral has a status of pending review or denied, additional information will be sent to support the outcome of this decision. Referral ID Referred By Referred To  
 [id number] [facility], [facility] [address] Phone: [phone] Visits Status Start Date End Date 1 New Request 5/26/17 5/26/18 If your referral has a status of pending review or denied, additional information will be sent to support the outcome of this decision. Referral ID Referred By Referred To  
 [id number] Audi Hawthorne Not Available Visits Status Start Date End Date 1 New Request 5/26/17 5/26/18 If your referral has a status of pending review or denied, additional information will be sent to support the outcome of this decision. Patient Instructions It was a pleasure to meet you! As discussed: 
 
I have ordered your age appropriate labs please complete them. You will need to fast 10-12hrs before your lab appointment. Complete labs two weeks before your next appointment. See the specialists as we try to find your diagnosis. When should you call for help? Call 911 anytime you think you may need emergency care. For example, call if: · You have severe trouble breathing. Call your doctor now or seek immediate medical care if: 
· You have new or worse trouble breathing. · You cough up dark brown or bloody mucus (sputum). · You have a new or higher fever. · You have a new rash. Watch closely for changes in your health, and be sure to contact your doctor if: 
· You cough more deeply or more often, especially if you notice more mucus or a change in the color of your mucus. · You are not getting better as expected. Leg and Ankle Edema: Care Instructions Your Care Instructions Swelling in the legs, ankles, and feet is called edema. It is common after you sit or stand for a while. Long plane flights or car rides often cause swelling in the legs and feet. You may also have swelling if you have to stand for long periods of time at your job. Problems with the veins in the legs (varicose veins) and changes in hormones can also cause swelling. Sometimes the swelling in the ankles and feet is caused by a more serious problem, such as heart failure, infection, blood clots, or liver or kidney disease. Follow-up care is a key part of your treatment and safety. Be sure to make and go to all appointments, and call your doctor if you are having problems. Its also a good idea to know your test results and keep a list of the medicines you take. How can you care for yourself at home? · If your doctor gave you medicine, take it as prescribed. Call your doctor if you think you are having a problem with your medicine. · Whenever you are resting, raise your legs up. Try to keep the swollen area higher than the level of your heart. · Take breaks from standing or sitting in one position. ¨ Walk around to increase the blood flow in your lower legs. ¨ Move your feet and ankles often while you stand, or tighten and relax your leg muscles. · Wear support stockings. Put them on in the morning, before swelling gets worse. · Eat a balanced diet. Lose weight if you need to. · Limit the amount of salt (sodium) in your diet. Salt holds fluid in the body and may increase swelling. When should you call for help? Call 911 anytime you think you may need emergency care. For example, call if: 
· You have symptoms of a blood clot in your lung (called a pulmonary embolism). These may include: 
¨ Sudden chest pain. ¨ Trouble breathing. ¨ Coughing up blood. Call your doctor now or seek immediate medical care if: 
· You have signs of a blood clot, such as: 
¨ Pain in your calf, back of the knee, thigh, or groin. ¨ Redness and swelling in your leg or groin. · You have symptoms of infection, such as: 
¨ Increased pain, swelling, warmth, or redness. ¨ Red streaks or pus. ¨ A fever. Watch closely for changes in your health, and be sure to contact your doctor if: 
· Your swelling is getting worse. · You have new or worsening pain in your legs. · You do not get better as expected. Where can you learn more? Go to http://jose-munira.info/. Enter F443 in the search box to learn more about \"Leg and Ankle Edema: Care Instructions. \" Current as of: May 27, 2016 Content Version: 11.2 © 3501-3319 [employer]. Care instructions adapted under license by [employer] (which disclaims liability or warranty for this information). If you have questions about a medical condition or this instruction, always ask your healthcare professional. [place] [address] any warranty or liability for your use of this information. Starting a Weight Loss Plan: Care Instructions Your Care Instructions If you are thinking about losing weight, it can be hard to know where to start. Your doctor can help you set up a weight loss plan that best meets your needs. You may want to take a class on nutrition or exercise, or join a weight loss support group.  If you have questions about how to make changes to your eating or exercise habits, ask your doctor about seeing a registered dietitian or an exercise specialist. 
It can be a big challenge to lose weight. But you do not have to make huge changes at once. Make small changes, and stick with them. When those changes become habit, add a few more changes. If you do not think you are ready to make changes right now, try to pick a date in the future. Make an appointment to see your doctor to discuss whether the time is right for you to start a plan. Follow-up care is a key part of your treatment and safety. Be sure to make and go to all appointments, and call your doctor if you are having problems. Its also a good idea to know your test results and keep a list of the medicines you take. How can you care for yourself at home? · Set realistic goals. Many people expect to lose much more weight than is likely. A weight loss of 5% to 10% of your body weight may be enough to improve your health. · Get family and friends involved to provide support. Talk to them about why you are trying to lose weight, and ask them to help. They can help by participating in exercise and having meals with you, even if they may be eating something different. · Find what works best for you. If you do not have time or do not like to cook, a program that offers meal replacement bars or shakes may be better for you. Or if you like to prepare meals, finding a plan that includes daily menus and recipes may be best. 
· Ask your doctor about other health professionals who can help you achieve your weight loss goals. ¨ A dietitian can help you make healthy changes in your diet. ¨ An exercise specialist or  can help you develop a safe and effective exercise program. 
¨ A counselor or psychiatrist can help you cope with issues such as depression, anxiety, or family problems that can make it hard to focus on weight loss. · Consider joining a support group for people who are trying to lose weight. Your doctor can suggest groups in your area. Where can you learn more? Go to http://jose-munira.info/. Enter Q804 in the search box to learn more about \"Starting a Weight Loss Plan: Care Instructions. \" Current as of: October 13, 2016 Content Version: 11.2 © 7907-5728 [employer]. Care instructions adapted under license by [employer] (which disclaims liability or warranty for this information). If you have questions about a medical condition or this instruction, always ask your healthcare professional. [place] [address] any warranty or liability for your use of this information. Introducing [facility]! Dear Nora Barr: Thank you for requesting a RapidM[employer] account. Our records indicate that you already have an active RapidMiner account. You can access your account anytime at [email or web address] Did you know that you can access your hospital and ER discharge instructions at any time in RapidMiner? You can also review all of your test results from your hospital stay or ER visit. Additional Information If you have questions, please visit the Frequently Asked Questions section of the RapidMiner website at [email or web address] Remember, RapidMiner is NOT to be used for urgent needs. For medical emergencies, dial 911. Now available from your iPhone and Android! Please provide this summary of care documentation to your next provider. Your primary care clinician is listed as Michel Valencia. If you have any questions after today's visit, please call [phone].

## 2017-05-26 NOTE — PROGRESS NOTES
HISTORY OF PRESENT ILLNESS  Thalia Hodges is a 64 y.o. female. HPI  New Patient  Thalia Hodges is new to my practice. Prior care: her prior care was with Dr. Kenya De Luna Since. Records have been reviewed. ER Visits/ Hospitalizations [id number]: Respiratory Failure 3/2017       Rheumatology Review (RA/OA/ +STANLEY)  Thalia Hodges has history of RA, OA and positive STANLEY. She is closely followed by Nicho Underwood. Records have provided by Thalia Hodges and reviewed including recent labs which show no abnormalities (CBC, CMP)    Pulmonary Review (Hypoxia)  Hilda Stauffer was hospitalized in 3/2017 for acute respiratory failure and has been on supplemental O2 since that time. She is closely followed [facility] (appt 2 weeks ago and will return in 2 weeks). Her current working dx is obesity hypoventilation syndrome. Notes stable FOX, compliant with O2. She reports SOB worse at night she is unable to sleep supine due to fear of hypoxia   No angina or AOE   Cardiovascular Review:  The patient has hypertension, hyperlipidemia and obesity Body mass index is 46.06 kg/(m^2). Followed by Dr. Ryan Mcfarlane but needs closer location. Was supposed to have a Janet but Thalia Hodges did not schedule because she \"could not miss work\"   Highest weight: 273 (current)   Lowest adult 150, 15 years ago  Weight loss in past: Exercise   High Carb intake   Sleep interrupted due to fear of apnea. Could not complete video sleep study due to fear 2/2 to physician led trauma in [address] [facility] [address] and Lifestyle: sedentary, nonsmoker  Home BP Monitoring: well controlled 130-140/70-80s (was 138/72 at Rheumatologist appt)   Pertinent ROS: taking medications as instructed, no medication side effects noted, no TIA's, no chest pain on exertion, notes stable dyspnea on exertion, no change, noting swelling of ankles. SHx: Newlywed, second marriage- Dec 24th. No children.   at law firm  Review of Systems   Constitutional: Positive for malaise/fatigue. Negative for diaphoresis, fever and weight loss. Eyes: Negative for blurred vision and pain. Respiratory: Positive for shortness of breath. Negative for cough and wheezing. Cardiovascular: Positive for leg swelling. Negative for chest pain and orthopnea. Gastrointestinal: Negative for abdominal pain. Genitourinary: Negative for dysuria. Neurological: Negative for focal weakness and headaches. Psychiatric/Behavioral: The patient is nervous/anxious. 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  furosemide (LASIX) 40 mg tablet Take 2 tabs once a day in the morning  Indications: Edema 30 Tab 0    potassium chloride SA (MICRO-K) 10 mEq capsule Take 1 cap daily 90 Cap 3    metoprolol succinate (TOPROL-XL) 25 mg XL tablet Take 1 Tab by mouth daily. 90 Tab 3    ergocalciferol (VITAMIN D2) 50,000 unit capsule Take 50,000 Units by mouth every seven (7) days. On Friday   Indications: VITAMIN D DEFICIENCY      ascorbic acid, vitamin C, (VITAMIN C) 250 mg tablet Take 250 mg by mouth daily.  vitamins  A,C,E-zinc-copper (OCUVITE PRESERVISION) [phone] unit-mg-unit tab tablet Take 1 Tab by mouth two (2) times a day.  EPINEPHrine (EPIPEN) 0.3 mg/0.3 mL (1:1,000) injection 0.3 mL by IntraMUSCular route once as needed. 2 pens 0.6 mL 0    cholecalciferol (VITAMIN D3) 1,000 unit tablet Take 2,000 Units by mouth daily. Except on Friday      albuterol-ipratropium (DUO-NEB) 2.5 mg-0.5 mg/3 ml nebu 3 mL by Nebulization route every six (6) hours as needed. 30 Nebule 0    atorvastatin (LIPITOR) 10 mg tablet Take 1 Tab by mouth daily. (Patient not taking: Reported on 3/9/2017) 30 Tab 0    fluticasone-salmeterol (ADVAIR DISKUS) 100-50 mcg/dose diskus inhaler Take 1 Puff by inhalation every twelve (12) hours.  loratadine (CLARITIN) 10 mg tablet Take 10 mg by mouth daily.  ORENCIA 125 mg/mL syrg 125 mg by SubCUTAneous route every seven (7) days. On Saturday         Allergies   Allergen Reactions    Bee Sting [Sting, Bee] Anaphylaxis    Codeine Hives and Shortness of Breath     bp drop     Methotrexate Nausea and Vomiting    Penicillins Hives and Shortness of Breath     bp drop      Visit Vitals    BP (!) 186/91 (BP 1 Location: Left arm, BP Patient Position: Sitting)    Pulse 73    Temp 98.5 °F (36.9 °C) (Oral)    Resp 20    Ht 5' 4.65\" (1.642 m)    Wt 273 lb 12.8 oz (124.2 kg)    SpO2 98%    BMI 46.06 kg/m2       Physical Exam   Constitutional: She is oriented to person, place, and time. No distress. Cardiovascular: Normal rate, regular rhythm and normal heart sounds. Pulmonary/Chest: No accessory muscle usage. No respiratory distress. She has decreased breath sounds in the right lower field and the left lower field. On nasal cannula      Musculoskeletal: She exhibits edema (BLE 1+ to knees ). Neurological: She is alert and oriented to person, place, and time.    Psychiatric:   Tearful intermittently      Patient: Vera Brown  MRN: [id number]  ACCT #: [de-identified]  : [id number]  Age: 64 years  Gender: Female  Height: 66 in  Weight: 267.5 lb  BSA: 2.26 m squared  BP: 139 / 76 mmHg  Study date: 03-Mar-2017  Status: Routine  Location: Bedside  [facility] ACC #: [id number]    Allergies: STING, BEE, CODEINE, METHOTREXATE, PENICILLINS    Ordering Physician: Boom Lake MD  Reading Group: [address] [place] [address] & [employer]  Technologist: Kyrie Sanchez, Echo Tech  Reading Physician: Guevara Hughes MD    SUMMARY:  Left ventricle: Size was normal. Systolic function was normal. Ejection  fraction was estimated to be 65 %. There were no regional wall motion  abnormalities. Pericardium: There was no pericardial effusion. INDICATIONS: SOB, Acute respiratory failure with hypoxia    HISTORY: Prior history: Rheumatoid arthritis, HTN, Elevated troponin,  Cardiomegaly and pericardial effusion on CT 3/2/17    PROCEDURE: This was a routine study. The study included complete 2D  imaging, M-mode, complete spectral Doppler, and color Doppler. The heart  rate was 82 bpm, at the start of the study. Systolic blood pressure was  139 mmHg, at the start of the study. Diastolic blood pressure was 76 mmHg,  at the start of the study. Images were obtained from the parasternal,  apical, subcostal, and suprasternal notch acoustic windows. Image quality  was adequate. LEFT VENTRICLE: Size was normal. Systolic function was normal. Ejection  fraction was estimated to be 65 %. There were no regional wall motion  abnormalities. Wall thickness was normal.    RIGHT VENTRICLE: The size was normal. Systolic function was normal.    LEFT ATRIUM: Size was normal.    ATRIAL SEPTUM: The atrial septum appeared intact. RIGHT ATRIUM: Size was normal.    MITRAL VALVE: Normal valve structure. DOPPLER: There was no regurgitation. AORTIC VALVE: Normal valve structure. DOPPLER: Transaortic velocity was  within the normal range. There was no stenosis. There was no regurgitation. TRICUSPID VALVE: Normal valve structure. DOPPLER: There was trivial  regurgitation. PULMONIC VALVE: Not well visualized, but normal Doppler findings. AORTA: The root exhibited normal size. PERICARDIUM: There was no pericardial effusion.     Final result (Exam End: 3/3/2017  4:06 PM) Open    Study Result   Indication: Hypoxia and history of rheumatoid arthritis evaluate for  interstitial lung disease. High-resolution CT.     COMPARISON: 3-2-17, chest CT     Multiple axial images were obtained from the lung apices to the adrenal glands. Intravenous contrast into was not utilized. Images were reformatted in the  sagittal and coronal plane. High-resolution images were obtained at inspiration  and expiration and in the prone position CT dose reduction was achieved through  use of a standardized protocol tailored for this examination and automatic  exposure control for dose modulation.     The mediastinal windows demonstrate a moderate pericardial effusion this is  unchanged. There is no adenopathy. The main pulmonary segment measures 3.5 cm  this is enlarged and suggest pulmonary hypertension. No pleural effusions. There  is persistent cardiomegaly.     No axillary adenopathy.     Imaging through the upper abdomen reveals no adrenal lesions. There are small  cysts in the liver. There are numerous diverticula in the visualized colon.     The lung windows and high-resolution images demonstrate that there is  atelectasis in both lower lobes especially the basilar segments right greater  than left. This has mildly decreased. There is also calcified granuloma in the  right lower lobe. There also areas of atelectasis in the right middle lobe and  lingula these have also decreased.     The high-resolution images demonstrate no significant interstitial lung disease  no fibrosis or bronchiectasis.     There is a small hiatal hernia.     IMPRESSION  IMPRESSION:  1. There are bibasilar areas of atelectasis especially the posterior segments of  both lower lobes right greater than left and all of these areas have improved  somewhat. Follow-up to resolution is suggested. 2. Old granulomatous disease  3. Probable pulmonary artery hypertension. 4. Moderate pericardial effusion and clinical correlation is suggested.        ASSESSMENT and PLAN  Charity Singh is a vibrant 64 y.o. female with h/o morbid obesity, HTN, Autoimmune disease and  hypoxia of unclear etiology requiring 02 who was seen today for establish care. Diagnoses and all orders for this visit:    Rheumatoid arthritis involving multiple sites with positive rheumatoid factor ([facility] [address]  -     HEPATITIS C AB  -     VITAMIN D, 25 HYDROXY    HTN (hypertension), benign- BP elevated in repeat office visits but reports lower BP elsewhere. Appt has been made with cardiology for 6/2/17. In the interim she will monitor her BP at home.   -     LIPID PANEL  -     REFERRAL TO CARDIOLOGY    SOB (shortness of breath)- obtain pulmonary records for review. Red flags to warrant ER or earlier clinical evaluation reviewed. -     SLEEP MEDICINE REFERRAL    Anxiety about health- h/o health care associated trauma and recent hospitalization and recent hospitalization have led to significant anxiety that is exacerabating her respiratory condition. Recommended pt try finding a therapist using the list provided and [email or web address]. Patient Education:  Reviewed concept of anxiety as biochemical imbalance of neurotransmitters and rationale for treatment. Instructed patient to contact office or 911 promptly should condition worsen or any new symptoms appear and provided on-call telephone numbers. IF THE PATIENT HAS ANY SUICIDAL OR HOMICIDAL IDEATION, CALL THE OFFICE, DISCUSS WITH A SUPPORT MEMBER OR GO TO THE ER IMMEDIATELY. Patient was agreeable with this    -     REFERRAL TO PSYCHOLOGY    Bilateral leg edema- recent echo and labs (liver and kidney function) at goal. ?stasis edema. May be candidate for leg wrapping--> wound care for evaluation and management. Presentation not c/w lymphedema (plus recent TSH wnl) Symptomatic relief discussed. -     REFERRAL TO WOUND CARE    Obesity, morbid, BMI 40.0-49.9 ([facility] [address]- I have reviewed/discussed the above normal BMI with the patient. I have recommended the following interventions: dietary management education, guidance, and counseling . Thomas Troncoso Need for hepatitis C screening test  -     HEPATITIS C AB    Other orders  -     Cancel: THYROID PANEL  -     Cancel: TSH 3RD GENERATION      Follow-up Disposition:  Return in about 6 weeks (around 7/7/2017) for Physical - 30 minute appointment. Medication risks/benefits/costs/interactions/alternatives discussed with patient. Susanna Ramirez  was given an after visit summary which includes diagnoses, current medications, & vitals. she expressed understanding with the diagnosis and plan.     40 minutes of 45 minutes of care coordination was spent counseling patient on treatment plan and assessing understanding

## 2017-05-26 NOTE — PROGRESS NOTES
Chief Complaint   Patient presents with   Dereck Vaughn [facility]     1. Have you been to the ER, urgent care clinic since your last visit? Hospitalized since your last visit? Yes When: 3/2017 Where: [address] [place] [address] Reason for visit: Hypoxemia    2. Have you seen or consulted any other health care providers outside of the [address] [place] [address] since your last visit? Include any pap smears or colon screening. Yes When: 2 weeks ago Where: Pulmonary  Reason for visit: Hypoxemia    Hypoxemia. Swelling in legs. HTN. Involuntary muscle jerks. Headache.

## 2017-05-26 NOTE — PATIENT INSTRUCTIONS
It was a pleasure to meet you! As discussed:    I have ordered your age appropriate labs please complete them. You will need to fast 10-12hrs before your lab appointment. Complete labs two weeks before your next appointment. See the specialists as we try to find your diagnosis. When should you call for help? Call 911 anytime you think you may need emergency care. For example, call if:  · You have severe trouble breathing. Call your doctor now or seek immediate medical care if:  · You have new or worse trouble breathing. · You cough up dark brown or bloody mucus (sputum). · You have a new or higher fever. · You have a new rash. Watch closely for changes in your health, and be sure to contact your doctor if:  · You cough more deeply or more often, especially if you notice more mucus or a change in the color of your mucus. · You are not getting better as expected. Leg and Ankle Edema: Care Instructions  Your Care Instructions  Swelling in the legs, ankles, and feet is called edema. It is common after you sit or stand for a while. Long plane flights or car rides often cause swelling in the legs and feet. You may also have swelling if you have to stand for long periods of time at your job. Problems with the veins in the legs (varicose veins) and changes in hormones can also cause swelling. Sometimes the swelling in the ankles and feet is caused by a more serious problem, such as heart failure, infection, blood clots, or liver or kidney disease. Follow-up care is a key part of your treatment and safety. Be sure to make and go to all appointments, and call your doctor if you are having problems. Its also a good idea to know your test results and keep a list of the medicines you take. How can you care for yourself at home? · If your doctor gave you medicine, take it as prescribed. Call your doctor if you think you are having a problem with your medicine.   · Whenever you are resting, raise your legs up. Try to keep the swollen area higher than the level of your heart. · Take breaks from standing or sitting in one position. ¨ Walk around to increase the blood flow in your lower legs. ¨ Move your feet and ankles often while you stand, or tighten and relax your leg muscles. · Wear support stockings. Put them on in the morning, before swelling gets worse. · Eat a balanced diet. Lose weight if you need to. · Limit the amount of salt (sodium) in your diet. Salt holds fluid in the body and may increase swelling. When should you call for help? Call 911 anytime you think you may need emergency care. For example, call if:  · You have symptoms of a blood clot in your lung (called a pulmonary embolism). These may include:  ¨ Sudden chest pain. ¨ Trouble breathing. ¨ Coughing up blood. Call your doctor now or seek immediate medical care if:  · You have signs of a blood clot, such as:  ¨ Pain in your calf, back of the knee, thigh, or groin. ¨ Redness and swelling in your leg or groin. · You have symptoms of infection, such as:  ¨ Increased pain, swelling, warmth, or redness. ¨ Red streaks or pus. ¨ A fever. Watch closely for changes in your health, and be sure to contact your doctor if:  · Your swelling is getting worse. · You have new or worsening pain in your legs. · You do not get better as expected. Where can you learn more? Go to http://jose-munira.info/. Enter P452 in the search box to learn more about \"Leg and Ankle Edema: Care Instructions. \"  Current as of: May 27, 2016  Content Version: 11.2  © 7367-7320 [employer]. Care instructions adapted under license by [employer] (which disclaims liability or warranty for this information). If you have questions about a medical condition or this instruction, always ask your healthcare professional. [place] [address] any warranty or liability for your use of this information.        Starting a Weight Loss Plan: Care Instructions  Your Care Instructions  If you are thinking about losing weight, it can be hard to know where to start. Your doctor can help you set up a weight loss plan that best meets your needs. You may want to take a class on nutrition or exercise, or join a weight loss support group. If you have questions about how to make changes to your eating or exercise habits, ask your doctor about seeing a registered dietitian or an exercise specialist.  It can be a big challenge to lose weight. But you do not have to make huge changes at once. Make small changes, and stick with them. When those changes become habit, add a few more changes. If you do not think you are ready to make changes right now, try to pick a date in the future. Make an appointment to see your doctor to discuss whether the time is right for you to start a plan. Follow-up care is a key part of your treatment and safety. Be sure to make and go to all appointments, and call your doctor if you are having problems. Its also a good idea to know your test results and keep a list of the medicines you take. How can you care for yourself at home? · Set realistic goals. Many people expect to lose much more weight than is likely. A weight loss of 5% to 10% of your body weight may be enough to improve your health. · Get family and friends involved to provide support. Talk to them about why you are trying to lose weight, and ask them to help. They can help by participating in exercise and having meals with you, even if they may be eating something different. · Find what works best for you. If you do not have time or do not like to cook, a program that offers meal replacement bars or shakes may be better for you. Or if you like to prepare meals, finding a plan that includes daily menus and recipes may be best.  · Ask your doctor about other health professionals who can help you achieve your weight loss goals.   ¨ A dietitian can help you make healthy changes in your diet. ¨ An exercise specialist or  can help you develop a safe and effective exercise program.  ¨ A counselor or psychiatrist can help you cope with issues such as depression, anxiety, or family problems that can make it hard to focus on weight loss. · Consider joining a support group for people who are trying to lose weight. Your doctor can suggest groups in your area. Where can you learn more? Go to http://jose-munira.info/. Enter Y857 in the search box to learn more about \"Starting a Weight Loss Plan: Care Instructions. \"  Current as of: October 13, 2016  Content Version: 11.2  © 2957-9476 [employer]. Care instructions adapted under license by [employer] (which disclaims liability or warranty for this information). If you have questions about a medical condition or this instruction, always ask your healthcare professional. [address] any warranty or liability for your use of this information.

## 2017-06-12 ENCOUNTER — TELEPHONE (OUTPATIENT)
Dept: INTERNAL MEDICINE CLINIC | Age: 62
End: 2017-06-12

## 2017-06-14 ENCOUNTER — OFFICE VISIT (OUTPATIENT)
Dept: INTERNAL MEDICINE CLINIC | Age: 62
End: 2017-06-14

## 2017-06-14 VITALS
BODY MASS INDEX: 45.88 KG/M2 | RESPIRATION RATE: 18 BRPM | OXYGEN SATURATION: 95 % | HEART RATE: 71 BPM | WEIGHT: 275.4 LBS | SYSTOLIC BLOOD PRESSURE: 138 MMHG | DIASTOLIC BLOOD PRESSURE: 90 MMHG | HEIGHT: 65 IN | TEMPERATURE: 98.6 F

## 2017-06-14 DIAGNOSIS — R09.02 HYPOXIA: ICD-10-CM

## 2017-06-14 DIAGNOSIS — G44.59 OTHER COMPLICATED HEADACHE SYNDROME: Primary | ICD-10-CM

## 2017-06-14 DIAGNOSIS — R25.1 TREMOR OF BOTH HANDS: ICD-10-CM

## 2017-06-14 NOTE — PROGRESS NOTES
Chief Complaint   Patient presents with    Headache     1. Have you been to the ER, urgent care clinic since your last visit? Hospitalized since your last visit? No    2. Have you seen or consulted any other health care providers outside of the [facility] since your last visit? Include any pap smears or colon screening. Yes When: 06/09/2017 Where: [facility]/Dr Pranay Brock Reason for visit: Liver US    Pt stated more episode when waking up with headache (migraine), SOB. Taping O2 canula, coming out of nose at night. Taking Tylenol.  Tremors

## 2017-06-14 NOTE — PROGRESS NOTES
HISTORY OF PRESENT ILLNESS  Mirian Salinas is a 64 y.o. female. Head Pain    This is a recurrent problem. The current episode started more than 1 week ago. The problem has been gradually worsening. Associated with: hypoxic episodes, tremor and memory loss. The pain is located in the generalized region. The quality of the pain is described as throbbing and dull. The pain is at a severity of 8/10. Associated symptoms include nausea. Pertinent negatives include no fever, no malaise/fatigue, no weakness, no dizziness and no visual change. Treatments tried: Oxygen  The treatment provided moderate relief. Has had Bipap in the past.   NC falls off her face   Has [employer]. Review of Systems   Constitutional: Negative for fever and malaise/fatigue. Eyes: Negative for blurred vision, double vision and photophobia. Gastrointestinal: Positive for nausea. Neurological: Positive for tremors (associated with hypoxia initially. ) and headaches. Negative for dizziness and weakness. 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  furosemide (LASIX) 40 mg tablet Take 2 tabs once a day in the morning  Indications: Edema 30 Tab 0    potassium chloride SA (MICRO-K) 10 mEq capsule Take 1 cap daily 90 Cap 3    metoprolol succinate (TOPROL-XL) 25 mg XL tablet Take 1 Tab by mouth daily.  90 Tab 3    ergocalciferol (VITAMIN D2) 50,000 unit capsule Take 50,000 Units by mouth every seven (7) days. On Friday   Indications: VITAMIN D DEFICIENCY      ascorbic acid, vitamin C, (VITAMIN C) 250 mg tablet Take 250 mg by mouth daily.  vitamins  A,C,E-zinc-copper (OCUVITE PRESERVISION) [phone] unit-mg-unit tab tablet Take 1 Tab by mouth two (2) times a day.  EPINEPHrine (EPIPEN) 0.3 mg/0.3 mL (1:1,000) injection 0.3 mL by IntraMUSCular route once as needed. 2 pens 0.6 mL 0    ORENCIA 125 mg/mL syrg 125 mg by SubCUTAneous route every seven (7) days. On Saturday      cholecalciferol (VITAMIN D3) 1,000 unit tablet Take 2,000 Units by mouth daily. Except on Friday      albuterol-ipratropium (DUO-NEB) 2.5 mg-0.5 mg/3 ml nebu 3 mL by Nebulization route every six (6) hours as needed. (Patient taking differently: 3 mL by Nebulization route every six (6) hours as needed. Indications: Not taking) 30 Nebule 0    atorvastatin (LIPITOR) 10 mg tablet Take 1 Tab by mouth daily. (Patient not taking: Reported on 3/9/2017) 30 Tab 0    fluticasone-salmeterol (ADVAIR DISKUS) 100-50 mcg/dose diskus inhaler Take 1 Puff by inhalation every twelve (12) hours.  loratadine (CLARITIN) 10 mg tablet Take 10 mg by mouth daily. Allergies   Allergen Reactions    Bee Sting [Sting, Bee] Anaphylaxis    Codeine Hives and Shortness of Breath     bp drop     Methotrexate Nausea and Vomiting    Penicillins Hives and Shortness of Breath     bp drop      Visit Vitals    /90 (BP 1 Location: Right arm, BP Patient Position: Sitting)    Pulse 71    Temp 98.6 °F (37 °C) (Oral)    Resp 18    Ht 5' 4.65\" (1.642 m)    Wt 275 lb 6.4 oz (124.9 kg)    SpO2 95%    BMI 46.33 kg/m2       Physical Exam   Constitutional: She is oriented to person, place, and time. She appears well-developed. No distress. Eyes: Conjunctivae are normal. Pupils are equal, round, and reactive to light. Neck: Neck supple. No thyromegaly present.    Cardiovascular: Normal rate, regular rhythm and normal heart sounds. Pulmonary/Chest: Effort normal and breath sounds normal. No respiratory distress. She has no wheezes. She has no rales. She exhibits no tenderness. Lymphadenopathy:     She has no cervical adenopathy. Neurological: She is alert and oriented to person, place, and time. She displays tremor (BUE: Coarse tremor ). Skin: Skin is warm. Psychiatric: She has a normal mood and affect. ASSESSMENT and PLAN  Alphonse Salinas was seen today for headache that is tightly correlated to her nasal cannula slippage overnight--> hypoxia. In the past she has had hypercapnia requiring bipap. She has a pulmonology appt at the end of the month but would benefit from being evaluated earlier to prevent worsening. Red flags to warrant ER or earlier clinical evaluation reviewed. See AVS for full details of plan and patient discussion. Diagnoses and all orders for this visit:    Other complicated headache syndrome    Hypoxia    Tremor of both hands- coarse tremor in RUE > LUE. Could be essential tremor also triggered by her hypercapnia but given unexplained respiratory failure neuromuscular disease is still on the ddx. Would benefit from neurology evaluation   -     REFERRAL TO NEUROLOGY      Follow-up Disposition:  Return if symptoms worsen or fail to improve before appointment. Medication risks/benefits/costs/interactions/alternatives discussed with patient. Adelso Harvey  was given an after visit summary which includes diagnoses, current medications, & vitals. she expressed understanding with the diagnosis and plan.

## 2017-06-14 NOTE — PROGRESS NOTES
Spoke with Hung CLAROS with Dr Cathy Flores [phone] and requested they order ABG's  Fri. 6/16/17 at [facility] Dx sob and an oxygen mask from [address] [place] [address]  since nasal cannula keeps coming off at night per dr Min Zuleta. Pt. Stated she is unable to move her appt. Up as she can only be seen on Fri's due to her job. Pt. States she is out of leave time and her boss even called her while she was admitted and asked her to work on the phone. Pt. Instructed to call us if she has not been contacted by pulmonary assoc. By 19E tomorrow.

## 2017-06-15 ENCOUNTER — TELEPHONE (OUTPATIENT)
Dept: INTERNAL MEDICINE CLINIC | Age: 62
End: 2017-06-15

## 2017-06-15 NOTE — TELEPHONE ENCOUNTER
[phone] pt says she was returning keri's call and to let her know that she has not heard from the dr's office yet.

## 2017-06-16 ENCOUNTER — TELEPHONE (OUTPATIENT)
Dept: INTERNAL MEDICINE CLINIC | Age: 62
End: 2017-06-16

## 2017-06-16 NOTE — TELEPHONE ENCOUNTER
Annmarie Mccarty with Dr Jose Waters' office called back to inform us she will be contacting patient per Dr Jose Waters' orders to schedule pt for appointment prior to further testing.

## 2017-07-07 ENCOUNTER — OFFICE VISIT (OUTPATIENT)
Dept: INTERNAL MEDICINE CLINIC | Age: 62
End: 2017-07-07

## 2017-07-07 VITALS
TEMPERATURE: 98.8 F | OXYGEN SATURATION: 99 % | WEIGHT: 272 LBS | SYSTOLIC BLOOD PRESSURE: 146 MMHG | HEART RATE: 74 BPM | BODY MASS INDEX: 45.32 KG/M2 | HEIGHT: 65 IN | DIASTOLIC BLOOD PRESSURE: 88 MMHG

## 2017-07-07 DIAGNOSIS — M05.79 RHEUMATOID ARTHRITIS INVOLVING MULTIPLE SITES WITH POSITIVE RHEUMATOID FACTOR (HCC): ICD-10-CM

## 2017-07-07 DIAGNOSIS — I10 HTN (HYPERTENSION), BENIGN: Primary | ICD-10-CM

## 2017-07-07 DIAGNOSIS — R60.0 LOCALIZED EDEMA: ICD-10-CM

## 2017-07-07 RX ORDER — FLUTICASONE PROPIONATE AND SALMETEROL 50; 250 UG/1; UG/1
2 POWDER RESPIRATORY (INHALATION) 2 TIMES DAILY
COMMUNITY
Start: 2017-06-30 | End: 2017-07-14

## 2017-07-07 NOTE — MR AVS SNAPSHOT
Visit Information Date & Time Provider Department Dept. Phone Encounter #  
 7/7/2017  9:30 AM Gina Neal MD [facility] Internal Medicine [phone] [id number] Follow-up Instructions Return in about 4 months (around 11/7/2017) for Physical - 30 minute appointment. Your Appointments 7/14/2017 11:00 AM  
New Patient with Jodene Favre, DO [facility] [address] [facility]) Appt Note: np tremors in her hand [address] [place] [address] [facility] [address]  
[phone]  
  
   
 [address]  
  
    
 8/4/2017  9:00 AM  
ESTABLISHED PATIENT with Santiago Brown MD  
[facility] ([address] [facility]) Appt Note: appt yemi'd by Sheri Neal MD for hypertension, leg edema pt seen by Dr. Mirna Atwood however she lives on [place] side of [facility] appt w/physician at [facility]; appt yemi'd by Sheri Neal MD for hypertension, leg edema pt seen by Dr. Mirna Atwood however she lives on [place] side of [facility] appt w/physician at [facility] pt requests female physician  
 [address] [facility] [address]  
[address] [facility] [address] [address] Upcoming Health Maintenance Date Due Hepatitis C Screening 1955 DTaP/Tdap/Td series (1 - Tdap) 11/10/1976 PAP AKA CERVICAL CYTOLOGY 11/10/1976 BREAST CANCER SCRN MAMMOGRAM 11/10/2005 FOBT Q 1 YEAR AGE 50-75 11/10/2005 ZOSTER VACCINE AGE 60> 11/10/2015 INFLUENZA AGE 9 TO ADULT 8/1/2017 Allergies as of 7/7/2017  Review Complete On: 7/7/2017 By: Gina Neal MD  
  
 Severity Noted Reaction Type Reactions Bee Sting [Sting, Bee] High 03/23/2016    Anaphylaxis Codeine High 03/23/2016    Hives, Shortness of Breath  
 bp drop Methotrexate  03/23/2016    Nausea and Vomiting Penicillins  03/23/2016    Hives, Shortness of Breath  
 bp drop Current Immunizations  Never Reviewed No immunizations on file. Not reviewed this visit You Were Diagnosed With   
  
 Codes Comments HTN (hypertension), benign    -  Primary ICD-10-CM: I10 
ICD-9-CM: 401.1 Localized edema     ICD-10-CM: R60.0 ICD-9-CM: 054. 3 Rheumatoid arthritis involving multiple sites with positive rheumatoid factor (HCC)     ICD-10-CM: M05.79 ICD-9-CM: 714.0 Vitals BP Pulse Temp Height(growth percentile) Weight(growth percentile) LMP  
 (!) 134/100 (BP 1 Location: Left arm, BP Patient Position: Sitting) 74 98.8 °F (37.1 °C) (Oral) 5' 4.65\" (1.642 m) 272 lb (123.4 kg) 02/23/2013 SpO2 BMI OB Status Smoking Status 99% 45.75 kg/m2 Postmenopausal Former Smoker Vitals History BMI and BSA Data Body Mass Index Body Surface Area 45.75 kg/m 2 2.37 m 2 Preferred Pharmacy Pharmacy Name Phone [address] [facility] [phone] Your Updated Medication List  
  
   
This list is accurate as of: 7/7/17 10:27 AM.  Always use your most recent med list.  
  
  
  
  
 * ADVAIR DISKUS 100-50 mcg/dose diskus inhaler Generic drug:  fluticasone-salmeterol Take 1 Puff by inhalation every twelve (12) hours. * ADVAIR DISKUS 250-50 mcg/dose diskus inhaler Generic drug:  fluticasone-salmeterol Take 2 Puffs by inhalation two (2) times a day. albuterol-ipratropium 2.5 mg-0.5 mg/3 ml Nebu Commonly known as:  DUO-NEB  
3 mL by Nebulization route every six (6) hours as needed. atorvastatin 10 mg tablet Commonly known as:  LIPITOR Take 1 Tab by mouth daily. cholecalciferol 1,000 unit tablet Commonly known as:  VITAMIN D3 Take 2,000 Units by mouth daily. Except on Friday CLARITIN 10 mg tablet Generic drug:  loratadine Take 10 mg by mouth daily. EPINEPHrine 0.3 mg/0.3 mL injection Commonly known as:  EPIPEN  
0.3 mL by IntraMUSCular route once as needed. 2 pens  
  
 furosemide 40 mg tablet Commonly known as:  LASIX Take 2 tabs once a day in the morning  Indications: Edema HAWTHORN PO Take 1 Tab by mouth daily. metoprolol succinate 25 mg XL tablet Commonly known as:  TOPROL-XL Take 1 Tab by mouth daily. Ocuvite PreserVision [phone] unit-mg-unit Tab tablet Generic drug:  vitamins  A,C,E-zinc-copper Take 1 Tab by mouth two (2) times a day. ORENCIA 125 mg/mL Syrg Generic drug:  abatacept 125 mg by SubCUTAneous route every seven (7) days. On Saturday  
  
 potassium chloride SA 10 mEq capsule Commonly known as:  Durel Goldsmith Take 1 cap daily VITAMIN C 250 mg tablet Generic drug:  ascorbic acid (vitamin C) Take 250 mg by mouth daily. VITAMIN D2 50,000 unit capsule Generic drug:  ergocalciferol Take 50,000 Units by mouth every seven (7) days. On Friday   Indications: VITAMIN D DEFICIENCY * Notice: This list has 2 medication(s) that are the same as other medications prescribed for you. Read the directions carefully, and ask your doctor or other care provider to review them with you. Follow-up Instructions Return in about 4 months (around 11/7/2017) for Physical - 30 minute appointment. Patient Instructions It was a pleasure to meet you! As discussed: 
 
Deara Dawn- please stop reminding Mrs. Berlin Reyes of things she said while she was hospitalized while we work on optimizing her memory and health =) Blood pressure - Your blood pressure was slightly high today  
- Since you told me your blood pressure is lower at home. Keep a log of your blood pressure every day. -Bring your blood pressure monitor to your next appointment.  
-Continue to follow a low salt/ low sodium diet (1500mg/ day) -If your blood pressure is too low (<90/60) or too high (>180/100) or you have any symptoms such as chest pain, dizziness, shortness of breath- seek immediate medical attention. Leg and Ankle Edema: Care Instructions Your Care Instructions Swelling in the legs, ankles, and feet is called edema. It is common after you sit or stand for a while. Long plane flights or car rides often cause swelling in the legs and feet. You may also have swelling if you have to stand for long periods of time at your job. Problems with the veins in the legs (varicose veins) and changes in hormones can also cause swelling. Sometimes the swelling in the ankles and feet is caused by a more serious problem, such as heart failure, infection, blood clots, or liver or kidney disease. Follow-up care is a key part of your treatment and safety. Be sure to make and go to all appointments, and call your doctor if you are having problems. Its also a good idea to know your test results and keep a list of the medicines you take. How can you care for yourself at home? · If your doctor gave you medicine, take it as prescribed. Call your doctor if you think you are having a problem with your medicine. · Whenever you are resting, raise your legs up. Try to keep the swollen area higher than the level of your heart. · Take breaks from standing or sitting in one position. ¨ Walk around to increase the blood flow in your lower legs. ¨ Move your feet and ankles often while you stand, or tighten and relax your leg muscles. · Wear support stockings. Put them on in the morning, before swelling gets worse. · Eat a balanced diet. Lose weight if you need to. · Limit the amount of salt (sodium) in your diet. Salt holds fluid in the body and may increase swelling. When should you call for help? Call 911 anytime you think you may need emergency care. For example, call if: 
· You have symptoms of a blood clot in your lung (called a pulmonary embolism). These may include: 
¨ Sudden chest pain. ¨ Trouble breathing. ¨ Coughing up blood. Call your doctor now or seek immediate medical care if: 
· You have signs of a blood clot, such as: 
¨ Pain in your calf, back of the knee, thigh, or groin. ¨ Redness and swelling in your leg or groin. · You have symptoms of infection, such as: 
¨ Increased pain, swelling, warmth, or redness. ¨ Red streaks or pus. ¨ A fever. Watch closely for changes in your health, and be sure to contact your doctor if: 
· Your swelling is getting worse. · You have new or worsening pain in your legs. · You do not get better as expected. Where can you learn more? Go to http://jose-munira.info/. Enter T524 in the search box to learn more about \"Leg and Ankle Edema: Care Instructions. \" Current as of: March 20, 2017 Content Version: 11.3 © 4379-2108 Native. Care instructions adapted under license by [employer] (which disclaims liability or warranty for this information). If you have questions about a medical condition or this instruction, always ask your healthcare professional. [address] any warranty or liability for your use of this information. Introducing [facility] [place] [facility]! Dear Divina Lyman: Thank you for requesting a [employer] account. Our records indicate that you already have an active [employer] account. You can access your account anytime at [email or web address] Did you know that you can access your hospital and ER discharge instructions at any time in [employer]? You can also review all of your test results from your hospital stay or ER visit. Additional Information If you have questions, please visit the Frequently Asked Questions section of the [employer] website at [email or web address] Remember, [employer] is NOT to be used for urgent needs. For medical emergencies, dial 911. Now available from your iPhone and Android! Please provide this summary of care documentation to your next provider. Your primary care clinician is listed as Olesya Mcgarry. If you have any questions after today's visit, please call [phone].

## 2017-07-07 NOTE — PROGRESS NOTES
HISTORY OF PRESENT ILLNESS  Leo Correia is a 64 y.o. female. HPI  Cardiovascular Review:  The patient has hypertension and hyperlipidemia. Body mass index is 45.75 kg/(m^2). Diet and Lifestyle: nonsmoker  Home BP Monitoring: is not measured at home. Last office visit with Ravi 120/70. Pertinent ROS: taking medications as instructed, no medication side effects noted, no TIA's, no chest pain on exertion, no dyspnea on exertion, no swelling of ankles. Hypoxia  She has seen Pulmonary. Modifications were made to her oxygen delivery system. She will also be starting Pulmonary rehab. Neurology Review  Has Headache/ Memory Loss/ Tremors. Symptoms have been stable. Has Neurology appt upcoming. SHx: Going to [facility] in 2 weeks, [place]    Review of Systems   Constitutional: Negative for chills and fever. Respiratory: Shortness of breath: stable on O2     Cardiovascular: Negative for chest pain. 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  HAWTHORN PO Take 1 Tab by mouth daily.  furosemide (LASIX) 40 mg tablet Take 2 tabs once a day in the morning  Indications: Edema 30 Tab 0    potassium chloride SA (MICRO-K) 10 mEq capsule Take 1 cap daily 90 Cap 3    metoprolol succinate (TOPROL-XL) 25 mg XL tablet Take 1 Tab by mouth daily.  90 Tab 3    ergocalciferol (VITAMIN D2) 50,000 unit capsule Take 50,000 Units by mouth every seven (7) days. On Friday   Indications: VITAMIN D DEFICIENCY      ascorbic acid, vitamin C, (VITAMIN C) 250 mg tablet Take 250 mg by mouth daily.  vitamins  A,C,E-zinc-copper (OCUVITE PRESERVISION) [phone] unit-mg-unit tab tablet Take 1 Tab by mouth two (2) times a day.  EPINEPHrine (EPIPEN) 0.3 mg/0.3 mL (1:1,000) injection 0.3 mL by IntraMUSCular route once as needed. 2 pens 0.6 mL 0    ORENCIA 125 mg/mL syrg 125 mg by SubCUTAneous route every seven (7) days. On Saturday      cholecalciferol (VITAMIN D3) 1,000 unit tablet Take 2,000 Units by mouth daily. Except on Friday      albuterol-ipratropium (DUO-NEB) 2.5 mg-0.5 mg/3 ml nebu 3 mL by Nebulization route every six (6) hours as needed. (Patient taking differently: 3 mL by Nebulization route every six (6) hours as needed. Indications: Not taking) 30 Nebule 0    atorvastatin (LIPITOR) 10 mg tablet Take 1 Tab by mouth daily. (Patient not taking: Reported on 3/9/2017) 30 Tab 0    fluticasone-salmeterol (ADVAIR DISKUS) 100-50 mcg/dose diskus inhaler Take 1 Puff by inhalation every twelve (12) hours.  loratadine (CLARITIN) 10 mg tablet Take 10 mg by mouth daily. Allergies   Allergen Reactions    Bee Sting [Sting, Bee] Anaphylaxis    Codeine Hives and Shortness of Breath     bp drop     Methotrexate Nausea and Vomiting    Penicillins Hives and Shortness of Breath     bp drop      Visit Vitals    BP (!) 134/100 (BP 1 Location: Left arm, BP Patient Position: Sitting)    Pulse 74    Temp 98.8 °F (37.1 °C) (Oral)    Ht 5' 4.65\" (1.642 m)    Wt 272 lb (123.4 kg)    SpO2 99%    BMI 45.75 kg/m2       Physical Exam   Constitutional: She is oriented to person, place, and time. No distress. Cardiovascular: Normal rate, regular rhythm and normal heart sounds. Pulmonary/Chest: No accessory muscle usage. No respiratory distress.  She has decreased breath sounds in the right lower field and the left lower field. On nasal cannula      Musculoskeletal: She exhibits edema (BLE 1+ to knees ). Neurological: She is alert and oriented to person, place, and time. Psychiatric: She has a normal mood and affect. ASSESSMENT and PLAN  Mary Berrios was seen today for headache. Diagnoses and all orders for this visit:    HTN (hypertension), benign- BP slightly elevated today but reports lower BP at home. No med changes. Counseled on low sodium diet and exercise. Monitor BP at home and notify office if BP remains elevated. Parameters given. See AVS for full details of plan and patient discussion. Localized edema- advised to wear compression hose as tolerated. See AVS for full details of plan and patient discussion. Red flags to warrant ER or earlier clinical evaluation reviewed. Rheumatoid arthritis involving multiple sites with positive rheumatoid factor ([facility]- per rheumatology. Follow-up Disposition:  Return in about 4 months (around 11/7/2017) for Physical - 30 minute appointment. Medication risks/benefits/costs/interactions/alternatives discussed with patient. Vasquez Lacy  was given an after visit summary which includes diagnoses, current medications, & vitals. she expressed understanding with the diagnosis and plan.

## 2017-07-07 NOTE — PROGRESS NOTES
Chief Complaint   Patient presents with    Headache     1. Have you been to the ER, urgent care clinic since your last visit? Hospitalized since your last visit? No    2. Have you seen or consulted any other health care providers outside of the [address] [place] [address] since your last visit? Include any pap smears or colon screening.  Yes When: 6/2017 Where: Pulmonologist Reason for visit: Follow up      Memory loss-scheduled with neurologist on next Friday    Metallic taste in mouth-since on O2

## 2017-07-07 NOTE — PATIENT INSTRUCTIONS
It was a pleasure to meet you! As discussed:    Dear Pippa Burns- please stop reminding Mrs. Jessica Moran of things she said while she was hospitalized while we work on optimizing her memory and health =)    Blood pressure  - Your blood pressure was slightly high today   - Since you told me your blood pressure is lower at home. Keep a log of your blood pressure every day. -Bring your blood pressure monitor to your next appointment.   -Continue to follow a low salt/ low sodium diet (1500mg/ day)  -If your blood pressure is too low (<90/60) or too high (>180/100) or you have any symptoms such as chest pain, dizziness, shortness of breath- seek immediate medical attention. Leg and Ankle Edema: Care Instructions  Your Care Instructions  Swelling in the legs, ankles, and feet is called edema. It is common after you sit or stand for a while. Long plane flights or car rides often cause swelling in the legs and feet. You may also have swelling if you have to stand for long periods of time at your job. Problems with the veins in the legs (varicose veins) and changes in hormones can also cause swelling. Sometimes the swelling in the ankles and feet is caused by a more serious problem, such as heart failure, infection, blood clots, or liver or kidney disease. Follow-up care is a key part of your treatment and safety. Be sure to make and go to all appointments, and call your doctor if you are having problems. Its also a good idea to know your test results and keep a list of the medicines you take. How can you care for yourself at home? · If your doctor gave you medicine, take it as prescribed. Call your doctor if you think you are having a problem with your medicine. · Whenever you are resting, raise your legs up. Try to keep the swollen area higher than the level of your heart. · Take breaks from standing or sitting in one position. ¨ Walk around to increase the blood flow in your lower legs.   ¨ Move your feet and ankles often while you stand, or tighten and relax your leg muscles. · Wear support stockings. Put them on in the morning, before swelling gets worse. · Eat a balanced diet. Lose weight if you need to. · Limit the amount of salt (sodium) in your diet. Salt holds fluid in the body and may increase swelling. When should you call for help? Call 911 anytime you think you may need emergency care. For example, call if:  · You have symptoms of a blood clot in your lung (called a pulmonary embolism). These may include:  ¨ Sudden chest pain. ¨ Trouble breathing. ¨ Coughing up blood. Call your doctor now or seek immediate medical care if:  · You have signs of a blood clot, such as:  ¨ Pain in your calf, back of the knee, thigh, or groin. ¨ Redness and swelling in your leg or groin. · You have symptoms of infection, such as:  ¨ Increased pain, swelling, warmth, or redness. ¨ Red streaks or pus. ¨ A fever. Watch closely for changes in your health, and be sure to contact your doctor if:  · Your swelling is getting worse. · You have new or worsening pain in your legs. · You do not get better as expected. Where can you learn more? Go to http://jose-munira.info/. Enter T901 in the search box to learn more about \"Leg and Ankle Edema: Care Instructions. \"  Current as of: March 20, 2017  Content Version: 11.3  © 1311-2805 [employer]. Care instructions adapted under license by [employer] (which disclaims liability or warranty for this information). If you have questions about a medical condition or this instruction, always ask your healthcare professional. [place] [address] any warranty or liability for your use of this information.

## 2017-07-08 LAB
25(OH)D3+25(OH)D2 SERPL-MCNC: 38.7 NG/ML (ref 30–100)
CHOLEST SERPL-MCNC: 273 MG/DL (ref 100–199)
HCV AB S/CO SERPL IA: <0.1 S/CO RATIO (ref 0–0.9)
HDLC SERPL-MCNC: 62 MG/DL
LDLC SERPL CALC-MCNC: 188 MG/DL (ref 0–99)
TRIGL SERPL-MCNC: 115 MG/DL (ref 0–149)
VLDLC SERPL CALC-MCNC: 23 MG/DL (ref 5–40)

## 2017-07-14 ENCOUNTER — OFFICE VISIT (OUTPATIENT)
Dept: NEUROLOGY | Age: 62
End: 2017-07-14

## 2017-07-14 DIAGNOSIS — Z99.81 SUPPLEMENTAL OXYGEN DEPENDENT: ICD-10-CM

## 2017-07-14 DIAGNOSIS — J96.01 ACUTE RESPIRATORY FAILURE WITH HYPOXIA (HCC): ICD-10-CM

## 2017-07-14 DIAGNOSIS — R51.9 HEADACHE DISORDER: ICD-10-CM

## 2017-07-14 DIAGNOSIS — R25.1 TREMOR OF BOTH HANDS: Primary | ICD-10-CM

## 2017-07-14 RX ORDER — ACETAMINOPHEN 325 MG/1
650 TABLET ORAL
COMMUNITY

## 2017-07-14 NOTE — PROGRESS NOTES
[facility] PATIENT EVALUATION/CONSULTATION       PATIENT NAME: Vik Batista    MRN: [id number]    REASON FOR CONSULTATION: Tremor    07/14/17      Previous records (physician notes, laboratory reports, and radiology reports) and imaging studies were reviewed and summarized. My recommendations will be communicated back to the patient's physician(s) via electronic medical record and/or by [facility] mail. HISTORY OF PRESENT ILLNESS:  Vik Batista is a 64 y.o. right handed female with a h/o RA, HTN presenting for evaluation of tremors and headaches. Patient is on supplemental oxygen due to hypoxia/hypercapnea following an episode of bronchitis with chronic cough x several weeks and persistent hypoxia since that time. She was evaluated by pulmonology and dx with obesity hypoventilation syndrome. No evidence of JATINDER or ILD. Patient reports onset of tremors when supplemental oxygen is not provided since 3/2017. Also reports that she awakens with headache whenever her nasal canula dislodges from her face overnight. Tremors are primarily located in the hands and appear intermittent. Tremors are slightly exacerbated by activity and decreased oxygenation. Sx are improved since onset. She is able to function independently and complete ADLs despite her tremor. No imbalance/falls/freezing spells. Also reports headaches since her hospitalization in March. If her oxygen is off transiently, she develops a severe headache over the vertex with associated hypoxia, no migrainous features. Headache responds to oxygen replacement and tylenol. Headaches are becoming less frequent overall. Previous evaluations: MRI Brain-chronic white matter disease    In addition, the patient denies a history of exposure to neuroleptics, (Reglan, Phenergan, Compazine, no encephalitis/meningitis, no significant exposure to insecticides/ pesticides/ heavy metals/ carbon monoxide.      No fam Hx of tremor, PD, ET.        PAST MEDICAL HISTORY:  Past Medical History:   Diagnosis Date    Hypertension     RA (rheumatoid arthritis) ([address]        PAST SURGICAL HISTORY:  Past Surgical History:   Procedure Laterality Date    HX GYN      R vulvectomy       FAMILY HISTORY:   Family History   Problem Relation Age of Onset    Heart Disease Father     Hypertension Father     Stroke Father     Heart Disease Mother     Hypertension Mother     Hypertension Sister     Hypertension Brother          SOCIAL HISTORY:  Social History     Social History    Marital status:      Spouse name: N/A    Number of children: N/A    Years of education: N/A     Social History Main Topics    Smoking status: Former Smoker    Smokeless tobacco: Not on file    Alcohol use Yes    Drug use: No    Sexual activity: No     Other Topics Concern    Not on file     Social History Narrative         MEDICATIONS:   Current Outpatient Prescriptions   Medication Sig Dispense Refill    acetaminophen (TYLENOL) 325 mg tablet Take  by mouth every four (4) hours as needed for Pain.  fluticasone-salmeterol (ADVAIR HFA) 115-21 mcg/actuation inhaler Take 2 Puffs by inhalation two (2) times a day.  furosemide (LASIX) 40 mg tablet Take 2 tabs once a day in the morning  Indications: Edema 30 Tab 0    potassium chloride SA (MICRO-K) 10 mEq capsule Take 1 cap daily 90 Cap 3    metoprolol succinate (TOPROL-XL) 25 mg XL tablet Take 1 Tab by mouth daily. 90 Tab 3    ergocalciferol (VITAMIN D2) 50,000 unit capsule Take 50,000 Units by mouth every seven (7) days. On Friday   Indications: VITAMIN D DEFICIENCY      ascorbic acid, vitamin C, (VITAMIN C) 250 mg tablet Take 250 mg by mouth daily.  vitamins  A,C,E-zinc-copper (OCUVITE PRESERVISION) 5,424160-304 unit-mg-unit tab tablet Take 1 Tab by mouth two (2) times a day.  EPINEPHrine (EPIPEN) 0.3 mg/0.3 mL (1:1,000) injection 0.3 mL by IntraMUSCular route once as needed.  2 pens 0.6 mL 0    ORENCIA 125 mg/mL syrg 125 mg by SubCUTAneous route every seven (7) days. On Saturday      cholecalciferol (VITAMIN D3) 1,000 unit tablet Take 2,000 Units by mouth daily. Except on Friday           ALLERGIES:  Allergies   Allergen Reactions    Bee Sting [Sting, Bee] Anaphylaxis    Codeine Hives and Shortness of Breath     bp drop     Methotrexate Nausea and Vomiting    Penicillins Hives and Shortness of Breath     bp drop         REVIEW OF SYSTEMS:  10 point ROS reviewed with patient. Please see scanned document under media. PHYSICAL EXAM:  Vital Signs:   Visit Vitals    BP (P) 150/80    Resp (P) 18    Wt (P) 123.8 kg (273 lb)    LMP 02/23/2013    SpO2 (P) 94%    BMI (P) 45.92 kg/m2        General Medical Exam:  General:  Well appearing, comfortable, in no apparent distress. Eyes/ENT: see cranial nerve examination. Neck: No masses appreciated. Full range of motion without tenderness. Respiratory:  Clear to auscultation, on supplemental oxygen via NC. Cardiac:  Regular rate and rhythm, no murmur. GI:  Soft, non-tender, non-distended abdomen. Bowel sounds normal. No masses, organomegaly. Extremities:  No deformities, edema, or skin discoloration. Skin:  No rashes or lesions. Neurological:  · Mental Status:  Alert and oriented to person, place, and time with fluent speech. · Cranial Nerves:   CNII/III/IV/VI: visual fields full to confrontation, EOMI, PERRL, no ptosis or nystagmus. CN V: Facial sensation intact bilaterally, masseter 5/5   CN VII: Facial muscles symmetric and strong   CN VIII: Hears finger rub well bilaterally, intact vestibular function   CN IX/X: Normal palatal movement   CN XI: Full strength shoulder shrug bilaterally   CN XII: Tongue protrusion full and midline without fasciculation or atrophy  · Motor: Normal tone and muscle bulk with no pronator drift. No atrophy or fasciculations present on examination.   Individual muscle group testing:  Shoulder abduction: Left:5/5   Right : 5/5    Shoulder adduction:   Left:5/5   Right : 5/5    Elbow flexion:      Left:5/5   Right : 5/5  Elbow extension:    Left:5/5   Right : 5/5   Wrist flexion:    Left:5/5   Right : 5/5  Wrist extension:    Left:5/5   Right : 5/5  Arm pronation:   Left:5/5   Right : 5/5  Arm supination:   Left:5/5   Right : 5/5    Finger flexion:    Left:5/5   Right : 5/5    Finger extension:   Left:5/5   Right : 5/5   Finger abduction:  Left:5/5   Right : 5/5   Finger adduction:   Left:5/5   Right : 5/5  Hip flexion:     Left:5/5   Right : 5/5         Hip extension:   Left:5/5   Right : 5/5    Knee flexion:    Left:5/5   Right : 5/5    Knee extension:   Left:5/5   Right : 5/5    Dorsiflexion:     Left:5/5   Right : 5/5  Plantar flexion:    Left:5/5   Right : 5/5    Foot inversion:    Left:5/5   Right : 5/5   Foot eversion:    Left:5/5   Right : 5/5  Toe flexion:      Left:5/5   Right : 5/5          Toe extension:    Left:5/5   Right : 5/5    · MSRs: No crossed adductors or clonus. RIGHT  LEFT   Brachioradialis 2+ 2+   Biceps 2+ 2+   Triceps 2+ 2+   Knee 2+ 2+   Achilles 1+ 1+        Plantar response Downward Downward          · Sensation: Normal and symmetric perception of pinprick, temperature, light touch, proprioception, and vibration; (-) Romberg. · Coordination: No dysmetria. Normal rapid alternating movements; finger-to-nose and heel-to- shin testing are within normal limits. No bradykinesia, rigidity. +Variable, intermittent non-physiologic tremor distal RUE which attenuates with distraction. · Gait: Normal native and stress (tandem/heel/toe walking). PERTINENT DATA:  INTERNAL RECORDS:  The patient's electronic medical record was reviewed.   The relevant details include:    Lab Results  Component Value Date/Time   WBC 5.9 04/21/2017 10:01 AM   HGB 14.1 04/21/2017 10:01 AM   HCT 45.1 04/21/2017 10:01 AM   PLATELET 418 27/02/5568 10:01 AM   MCV 89.1 04/21/2017 10:01 AM     Lab Results  Component Value Date/Time   TSH 0.89 03/03/2017 09:25 AM   T4, Free 1.2 03/04/2017 11:00 AM      Lab Results   Component Value Date/Time    Sodium 143 04/21/2017 10:01 AM    Potassium 3.8 04/21/2017 10:01 AM    Chloride 103 04/21/2017 10:01 AM    CO2 33 04/21/2017 10:01 AM    Anion gap 7 04/21/2017 10:01 AM    Glucose 110 04/21/2017 10:01 AM    BUN 17 04/21/2017 10:01 AM    Creatinine 0.59 04/21/2017 10:01 AM    BUN/Creatinine ratio 29 04/21/2017 10:01 AM    GFR est AA >60 04/21/2017 10:01 AM    GFR est non-AA >60 04/21/2017 10:01 AM    Calcium 8.7 04/21/2017 10:01 AM    Bilirubin, total 0.3 04/21/2017 10:01 AM    ALT (SGPT) 51 04/21/2017 10:01 AM    AST (SGOT) 32 04/21/2017 10:01 AM    Alk. phosphatase 84 04/21/2017 10:01 AM    Protein, total 6.9 04/21/2017 10:01 AM    Albumin 3.0 04/21/2017 10:01 AM    Globulin 3.9 04/21/2017 10:01 AM    A-G Ratio 0.8 04/21/2017 10:01 AM       CT Results (maximum last 3): Results from Hospital Encounter encounter on 03/02/17   CT HEAD WO CONT   Narrative EXAM:  CT HEAD WO CONT  Clinical history: Confusion, delirium, altered mental status unexplained. INDICATION:   Confusion/delirium, altered LOC, unexplained    COMPARISON: None. TECHNIQUE: Unenhanced CT of the head was performed using 5 mm images. Brain and  bone windows were generated. CT dose reduction was achieved through use of a  standardized protocol tailored for this examination and automatic exposure  control for dose modulation. FINDINGS:  There are scattered hypodensities in the cerebral white matter which are typical  in degree and distribution for patient of this age. There is no intracranial  hemorrhage, extra-axial collection, mass, mass effect or midline shift. The  basilar cisterns are open. No acute infarct is identified. The bone windows  demonstrate no abnormalities. The visualized portions of the paranasal sinuses  and mastoid air cells are clear.          Impression IMPRESSION:    No acute intracranial process is identified. There are scattered areas of hypodensities in the cerebral white matter which in  degree and distribution are atypical for a patient of this age. Contrast-enhanced MRI of the brain for full delineation is recommended. MRI Results (maximum last 3): Results from Hospital Encounter encounter on 03/02/17   MRI BRAIN W WO CONT   Narrative Clinical history: Confusion, delirium, altered mental status unexplained. AMS  and mild clonus      INDICATION:   Confusion/delirium, altered LOC, unexplained      COMPARISON: CT head 3/4/2017    TECHNIQUE: MR examination of the brain includes axial and sagittal T1  pre-and-post contrast, axial T2, axial FLAIR, axial gradient echo, axial DWI,  coronal T1 postcontrast.    Contrast: The patient was administered gadolinium-based contrast material axial  and coronal T1-weighted postcontrast enhanced imaging was obtained. Please note  that the patient's IV blew during the course of the procedure. A 2 mL of oral contrast was administered this was followed by saline. There is  however contrast opacification present. FINDINGS:   There are extensive confluent periventricular and scattered foci of abnormal  increased T2 signal intensity in the corona radiata and centrum semiovale. There  is no abnormal parenchymal enhancement. There is no intracranial mass, hemorrhage or evidence of acute infarction. There is no Chiari or syrinx. The pituitary and infundibulum are grossly  unremarkable. There is no skull base mass. Cerebellopontine angles are grossly  unremarkable. The major intracranial vascular flow-voids are unremarkable. No  evidence of demyelinating process. There is no abnormal parenchymal enhancement. There is no abnormal meningeal  enhancement. The cavernous sinuses are symmetric. Optic chiasm and infundibulum  grossly unremarkable. Orbits are grossly symmetric. Dural venous sinuses are  grossly patent.     The brain architecture is normal. There is no evidence of midline shift or  mass-effect. There are no extra-axial fluid collections. The mastoid air cells and paranasal sinuses are well pneumatized and clear. Impression IMPRESSION:      Findings most likely related to moderate to severe chronic microvascular  ischemic change for patient age. Infectious, inflammatory and demyelinating  etiologies are less likely overall. Follow-up exams as clinically warranted. There is no intracranial mass, hemorrhage or evidence of acute infarction. ASSESSMENT/PLAN:      ICD-10-CM ICD-9-CM    1. Tremor of both hands R25.1 781.0    2. Headache disorder R51 784.0    3. Acute respiratory failure with hypoxia ([facility]) J96.01 518.81    4. Supplemental oxygen dependent Z99.81 V552    64year old female w/HTN, HPL, RA, oxygen dependent (obesity hypoventilation syndrome) presenting with episodic UE tremor w/action and non-migrainous headaches associated with hypoxia when her nasal canula is inadvertently removed. On examination there is intermittent variable resting/postural tremor of the right hand which attenuates with distraction most consistent with non-physiologic tremor. There is no associated rigidity or bradykinesia suggestive of Parkinsonism. Additionally, there is no significant action tremor on today's examination that would suggest essential tremor. Remainder of her neurological examination is non-focal.  Particularly, there is no indication of underlying neuromuscular disease that would explain her sudden onset respiratory failure. Headaches appear episodic and non-migrainous at present, primarily provoked by hypoxia with decreasing frequency. Defer pharmacologic treatment for headache prevention as maintaining adequate oxygenation appears to control episodes.     MRI Brain performed 3/2017 independently reviewed showing moderate to severe white matter ischemic changes without acute ischemia or focal neoplasm. WM lesions are most likely attributable to comorbid HTN, HPL and h/o tobacco use. She was advised to start a daily ASA for stroke prevention. She would also benefit from statin therapy to address HPL but unfortunately reports statin intolerance. Discussed with the patient and her  that she should return with any new or worsening neurologic symptoms for repeat clinical assessment. Follow-up Disposition:  Return if symptoms worsen or fail to improve. Bela Hernandezr, DO  Staff Neurologist  [address], [address] [facility] [employer] Neu[employer]       CC Referring provider:    Darlene Waller MD

## 2017-07-14 NOTE — MR AVS SNAPSHOT
Visit Information Date & Time Provider Department Dept. Phone Encounter #  
 7/14/2017 11:00 AM Mercy Carbajal, [address] [facility] at [address] [id number] Follow-up Instructions Return if symptoms worsen or fail to improve. Your Appointments 8/4/2017  9:00 AM  
ESTABLISHED PATIENT with Liliana Lee MD  
[facility] ([address]) Appt Note: appt yemi'd by Shantanu Forrest per Julius Frazier MD for hypertension, leg edema pt seen by Dr. Vasu Jensen however she lives on this side of [facility] appt w/physician at [facility]; appt yemi'd by Shantanu Forrest per Julius Frazier MD for hypertension, leg edema pt seen by Dr. Vasu Jensen however she lives on this side of [facility] appt w/physician at [facility] pt requests female physician  
 [address] [facility] [address]  
[address] [facility] [place]  
  
    
 12/8/2017  2:00 PM  
PHYSICAL with Julius Frazier MD  
[facility] Internal Medicine [address] [facility]) Appt Note: cpe  
 [address] [facility] [address]  
[address] [id number] [place] [address] Upcoming Health Maintenance Date Due DTaP/Tdap/Td series (1 - Tdap) 11/10/1976 PAP AKA CERVICAL CYTOLOGY 11/10/1976 BREAST CANCER SCRN MAMMOGRAM 11/10/2005 FOBT Q 1 YEAR AGE 50-75 11/10/2005 ZOSTER VACCINE AGE 60> 11/10/2015 INFLUENZA AGE 9 TO ADULT 8/1/2017 Allergies as of 7/14/2017  Review Complete On: 7/14/2017 By: Mercy Carbajal, DO Severity Noted Reaction Type Reactions Bee Sting [Sting, Bee] High 03/23/2016    Anaphylaxis Codeine High 03/23/2016    Hives, Shortness of Breath  
 bp drop Methotrexate  03/23/2016    Nausea and Vomiting Penicillins  03/23/2016    Hives, Shortness of Breath  
 bp drop Current Immunizations  Never Reviewed No immunizations on file. Not reviewed this visit You Were Diagnosed With   
  
 Codes Comments Tremor of both hands    -  Primary ICD-10-CM: R25.1 ICD-9-CM: 781.0 Acute respiratory failure with hypoxia (HCC)     ICD-10-CM: J96.01 
ICD-9-CM: 518.81 Supplemental oxygen dependent     ICD-10-CM: Z99.81 ICD-9-CM: V46.2 Vitals BP Resp Weight(growth percentile) LMP SpO2 BMI  
 (P) 150/80 (P) 18 (P) 273 lb (123.8 kg) 02/23/2013 (P) 94% (P) 45.92 kg/m2 OB Status Smoking Status Postmenopausal Former Smoker BMI and BSA Data Body Mass Index Body Surface Area (P) 45.92 kg/m 2 (P) 2.38 m 2 Preferred Pharmacy Pharmacy Name Phone [address] [facility] [phone] Your Updated Medication List  
  
   
This list is accurate as of: 7/14/17 11:20 AM.  Always use your most recent med list.  
  
  
  
  
 ADVAIR -21 mcg/actuation inhaler Generic drug:  fluticasone-salmeterol Take 2 Puffs by inhalation two (2) times a day. cholecalciferol 1,000 unit tablet Commonly known as:  VITAMIN D3 Take 2,000 Units by mouth daily. Except on Friday EPINEPHrine 0.3 mg/0.3 mL injection Commonly known as:  EPIPEN  
0.3 mL by IntraMUSCular route once as needed. 2 pens  
  
 furosemide 40 mg tablet Commonly known as:  LASIX Take 2 tabs once a day in the morning  Indications: Edema  
  
 metoprolol succinate 25 mg XL tablet Commonly known as:  TOPROL-XL Take 1 Tab by mouth daily. Ocuvite PreserVision [phone] unit-mg-unit Tab tablet Generic drug:  vitamins  A,C,E-zinc-copper Take 1 Tab by mouth two (2) times a day. ORENCIA 125 mg/mL Syrg Generic drug:  abatacept 125 mg by SubCUTAneous route every seven (7) days. On Saturday  
  
 potassium chloride SA 10 mEq capsule Commonly known as:  [place] Take 1 cap daily TYLENOL 325 mg tablet Generic drug:  acetaminophen Take  by mouth every four (4) hours as needed for Pain. VITAMIN C 250 mg tablet Generic drug:  ascorbic acid (vitamin C) Take 250 mg by mouth daily. VITAMIN D2 50,000 unit capsule Generic drug:  ergocalciferol Take 50,000 Units by mouth every seven (7) days. On Friday   Indications: VITAMIN D DEFICIENCY Follow-up Instructions Return if symptoms worsen or fail to improve. Patient Instructions A Healthy Lifestyle: Care Instructions Your Care Instructions A healthy lifestyle can help you feel good, stay at a healthy weight, and have plenty of energy for both work and play. A healthy lifestyle is something you can share with your whole family. A healthy lifestyle also can lower your risk for serious health problems, such as high blood pressure, heart disease, and diabetes. You can follow a few steps listed below to improve your health and the health of your family. Follow-up care is a key part of your treatment and safety. Be sure to make and go to all appointments, and call your doctor if you are having problems. Its also a good idea to know your test results and keep a list of the medicines you take. How can you care for yourself at home? · Do not eat too much sugar, fat, or fast foods. You can still have dessert and treats now and then. The goal is moderation. · Start small to improve your eating habits. Pay attention to portion sizes, drink less juice and soda pop, and eat more fruits and vegetables. ¨ Eat a healthy amount of food. A 3-ounce serving of meat, for example, is about the size of a deck of cards. Fill the rest of your plate with vegetables and whole grains. ¨ Limit the amount of soda and sports drinks you have every day. Drink more water when you are thirsty. ¨ Eat at least 5 servings of fruits and vegetables every day.  It may seem like a lot, but it is not hard to reach this goal. A serving or helping is 1 piece of fruit, 1 cup of vegetables, or 2 cups of leafy, raw vegetables. Have an apple or some carrot sticks as an afternoon snack instead of a candy bar. Try to have fruits and/or vegetables at every meal. 
· Make exercise part of your daily routine. You may want to start with simple activities, such as walking, bicycling, or slow swimming. Try to be active 30 to 60 minutes every day. You do not need to do all 30 to 60 minutes all at once. For example, you can exercise 3 times a day for 10 or 20 minutes. Moderate exercise is safe for most people, but it is always a good idea to talk to your doctor before starting an exercise program. 
· Keep moving. Norpolan Feeler the lawn, work in the garden, or THUBIT. Take the stairs instead of the elevator at work. · If you smoke, quit. People who smoke have an increased risk for heart attack, stroke, cancer, and other lung illnesses. Quitting is hard, but there are ways to boost your chance of quitting tobacco for good. ¨ Use nicotine gum, patches, or lozenges. ¨ Ask your doctor about stop-smoking programs and medicines. ¨ Keep trying. In addition to reducing your risk of diseases in the future, you will notice some benefits soon after you stop using tobacco. If you have shortness of breath or asthma symptoms, they will likely get better within a few weeks after you quit. · Limit how much alcohol you drink. Moderate amounts of alcohol (up to 2 drinks a day for men, 1 drink a day for women) are okay. But drinking too much can lead to liver problems, high blood pressure, and other health problems. Family health If you have a family, there are many things you can do together to improve your health. · Eat meals together as a family as often as possible. · Eat healthy foods. This includes fruits, vegetables, lean meats and dairy, and whole grains. · Include your family in your fitness plan. Most people think of activities such as jogging or tennis as the way to fitness, but there are many ways you and your family can be more active. Anything that makes you breathe hard and gets your heart pumping is exercise. Here are some tips: 
¨ Walk to do errands or to take your child to school or the bus. ¨ Go for a family bike ride after dinner instead of watching TV. Where can you learn more? Go to http://jose-munira.info/. Enter K921 in the search box to learn more about \"A Healthy Lifestyle: Care Instructions. \" Current as of: July 26, 2016 Content Version: 11.3 © 7862-9310 AIM. Care instructions adapted under license by [employer] (which disclaims liability or warranty for this information). If you have questions about a medical condition or this instruction, always ask your healthcare professional. [address] any warranty or liability for your use of this information. Introducing [facility] & HEALTH SERVICES! Dear Yadi Casper: Thank you for requesting a [employer] account. Our records indicate that you already have an active [employer] account. You can access your account anytime at [email or web address] Did you know that you can access your hospital and ER discharge instructions at any time in [employer]? You can also review all of your test results from your hospital stay or ER visit. Additional Information If you have questions, please visit the Frequently Asked Questions section of the [employer] website at [email or web address] Remember, [employer] is NOT to be used for urgent needs. For medical emergencies, dial 911. Now available from your iPhone and Android! Please provide this summary of care documentation to your next provider. Your primary care clinician is listed as Melvin Muir.  If you have any questions after today's visit, please call [phone].

## 2017-07-14 NOTE — PATIENT INSTRUCTIONS

## 2017-07-20 NOTE — PROGRESS NOTES
ALEYDA- has upcoming appointment with you on 8/4/17   ---  Hi Ms. Aranda Montgomery you for completing your labs which show:  Your cholesterol is very elevated. Please see Dr. Zeus Richardson in Cardiology on 8/4/17 as scheduled to discuss next steps. In the interim . Continue to work on optimizing your weight (goal lose at least 5% body weight), healthy eating and cardiovascular disease (Recommendation: reduce carbs to 150-200g/ day and the Mediterranean Diet). The remainder of your labs are normal:  Hepatitis C AB (Hepatitis C Screening)  Vitamin D level  Do not hesitate to contact the office if you have any questions or concerns before your next appointment.    Kind regards,   Dr. Glenn Olsen

## 2017-08-04 ENCOUNTER — CLINICAL SUPPORT (OUTPATIENT)
Dept: CARDIOLOGY CLINIC | Age: 62
End: 2017-08-04

## 2017-08-04 ENCOUNTER — OFFICE VISIT (OUTPATIENT)
Dept: CARDIOLOGY CLINIC | Age: 62
End: 2017-08-04

## 2017-08-04 VITALS
BODY MASS INDEX: 46.33 KG/M2 | SYSTOLIC BLOOD PRESSURE: 160 MMHG | DIASTOLIC BLOOD PRESSURE: 86 MMHG | HEIGHT: 64 IN | HEART RATE: 80 BPM | RESPIRATION RATE: 16 BRPM | WEIGHT: 271.4 LBS

## 2017-08-04 DIAGNOSIS — J96.11 CHRONIC RESPIRATORY FAILURE WITH HYPOXIA AND HYPERCAPNIA (HCC): ICD-10-CM

## 2017-08-04 DIAGNOSIS — R60.0 LOCALIZED EDEMA: ICD-10-CM

## 2017-08-04 DIAGNOSIS — G93.1 BRAIN HYPOXIC INJURY (HCC): ICD-10-CM

## 2017-08-04 DIAGNOSIS — M06.9 RHEUMATOID ARTHRITIS INVOLVING MULTIPLE SITES, UNSPECIFIED RHEUMATOID FACTOR PRESENCE: ICD-10-CM

## 2017-08-04 DIAGNOSIS — J96.11 CHRONIC RESPIRATORY FAILURE WITH HYPOXIA AND HYPERCAPNIA (HCC): Primary | ICD-10-CM

## 2017-08-04 DIAGNOSIS — I27.20 PULMONARY HYPERTENSION (HCC): ICD-10-CM

## 2017-08-04 DIAGNOSIS — E78.5 DYSLIPIDEMIA, GOAL LDL BELOW 70: ICD-10-CM

## 2017-08-04 DIAGNOSIS — R06.02 SOB (SHORTNESS OF BREATH): ICD-10-CM

## 2017-08-04 DIAGNOSIS — J96.01 ACUTE RESPIRATORY FAILURE WITH HYPOXIA (HCC): ICD-10-CM

## 2017-08-04 DIAGNOSIS — I31.39 PERICARDIAL EFFUSION: ICD-10-CM

## 2017-08-04 DIAGNOSIS — I10 ESSENTIAL HYPERTENSION: ICD-10-CM

## 2017-08-04 DIAGNOSIS — M05.79 RHEUMATOID ARTHRITIS INVOLVING MULTIPLE SITES WITH POSITIVE RHEUMATOID FACTOR (HCC): ICD-10-CM

## 2017-08-04 DIAGNOSIS — J96.12 CHRONIC RESPIRATORY FAILURE WITH HYPOXIA AND HYPERCAPNIA (HCC): ICD-10-CM

## 2017-08-04 DIAGNOSIS — R41.3 MEMORY LOSS: ICD-10-CM

## 2017-08-04 DIAGNOSIS — I10 HTN (HYPERTENSION), BENIGN: ICD-10-CM

## 2017-08-04 DIAGNOSIS — I31.39 PERICARDIAL EFFUSION: Primary | ICD-10-CM

## 2017-08-04 DIAGNOSIS — I10 HTN (HYPERTENSION), BENIGN: Primary | ICD-10-CM

## 2017-08-04 DIAGNOSIS — I25.10 CORONARY ARTERY DISEASE INVOLVING NATIVE HEART, ANGINA PRESENCE UNSPECIFIED, UNSPECIFIED VESSEL OR LESION TYPE: ICD-10-CM

## 2017-08-04 DIAGNOSIS — J96.12 CHRONIC RESPIRATORY FAILURE WITH HYPOXIA AND HYPERCAPNIA (HCC): Primary | ICD-10-CM

## 2017-08-04 RX ORDER — ASPIRIN 81 MG/1
TABLET ORAL DAILY
COMMUNITY
End: 2018-03-29

## 2017-08-04 NOTE — MR AVS SNAPSHOT
Visit Information Date & Time Provider Department Dept. Phone Encounter #  
 8/4/2017  9:00 AM Karlie Alejo MD [facility] [phone] [id number] Your Appointments 8/4/2017  9:00 AM  
ESTABLISHED PATIENT with Karlie Alejo MD  
[facility] ([address]) Appt Note: appt yemi'd by William Weber per Colletta Stains, MD for hypertension, leg edema pt seen by Dr. Siddhartha Chaudhary however she lives on this side of [facility] appt w/physician at [facility]; appt yemi'd by William Weber per Colletta Stains, MD for hypertension, leg edema pt seen by Dr. Siddhartha Chaudhary however she lives on this side of [facility] appt w/physician at [facility] pt requests female physician  
 [address] 200 [facility] [address]  
[address] [facility]  
  
    
 12/8/2017  2:00 PM  
PHYSICAL with Colletta Stains, MD  
[facility] Internal Medicine [address] [facility]) Appt Note: cpe  
 [address] [facility] [address]  
[phone] [place] [address] Upcoming Health Maintenance Date Due DTaP/Tdap/Td series (1 - Tdap) 11/10/1976 PAP AKA CERVICAL CYTOLOGY 11/10/1976 BREAST CANCER SCRN MAMMOGRAM 11/10/2005 FOBT Q 1 YEAR AGE 50-75 11/10/2005 ZOSTER VACCINE AGE 60> 9/10/2015 INFLUENZA AGE 9 TO ADULT 8/1/2017 Allergies as of 8/4/2017  Review Complete On: 7/14/2017 By: Yeny Reece DO Severity Noted Reaction Type Reactions Bee Sting [Sting, Bee] High 03/23/2016    Anaphylaxis Codeine High 03/23/2016    Hives, Shortness of Breath  
 bp drop Methotrexate  03/23/2016    Nausea and Vomiting Penicillins  03/23/2016    Hives, Shortness of Breath  
 bp drop Current Immunizations  Never Reviewed No immunizations on file. Not reviewed this visit Vitals LMP OB Status Smoking Status 02/23/2013 Postmenopausal Former Smoker Your Updated Medication List  
  
   
This list is accurate as of: 8/4/17  7:38 AM.  Always use your most recent med list.  
  
  
  
  
 ADVAIR -21 mcg/actuation inhaler Generic drug:  fluticasone-salmeterol Take 2 Puffs by inhalation two (2) times a day. cholecalciferol 1,000 unit tablet Commonly known as:  VITAMIN D3 Take 2,000 Units by mouth daily. Except on Friday EPINEPHrine 0.3 mg/0.3 mL injection Commonly known as:  EPIPEN  
0.3 mL by IntraMUSCular route once as needed. 2 pens  
  
 furosemide 40 mg tablet Commonly known as:  LASIX Take 2 tabs once a day in the morning  Indications: Edema  
  
 metoprolol succinate 25 mg XL tablet Commonly known as:  TOPROL-XL Take 1 Tab by mouth daily. Ocuvite PreserVision [phone] unit-mg-unit Tab tablet Generic drug:  vitamins  A,C,E-zinc-copper Take 1 Tab by mouth two (2) times a day. ORENCIA 125 mg/mL Syrg Generic drug:  abatacept 125 mg by SubCUTAneous route every seven (7) days. On Saturday  
  
 potassium chloride SA 10 mEq capsule Commonly known as:  [place] Take 1 cap daily TYLENOL 325 mg tablet Generic drug:  acetaminophen Take  by mouth every four (4) hours as needed for Pain. VITAMIN C 250 mg tablet Generic drug:  ascorbic acid (vitamin C) Take 250 mg by mouth daily. VITAMIN D2 50,000 unit capsule Generic drug:  ergocalciferol Take 50,000 Units by mouth every seven (7) days. On Friday   Indications: VITAMIN D DEFICIENCY Introducing [facility] & HEALTH SERVICES! Dear Avinash Manzaon: Thank you for requesting a [employer] account. Our records indicate that you already have an active [employer] account. You can access your account anytime at [email or web address] Did you know that you can access your hospital and ER discharge instructions at any time in Sign[employer]? You can also review all of your test results from your hospital stay or ER visit. Additional Information If you have questions, please visit the Frequently Asked Questions section of the [employer] website at [email or web address] Remember, [employer] is NOT to be used for urgent needs. For medical emergencies, dial 911. Now available from your iPhone and Android! Please provide this summary of care documentation to your next provider. Your primary care clinician is listed as Howard Ta. If you have any questions after today's visit, please call [phone].

## 2017-08-04 NOTE — PROGRESS NOTES
Patient scheduled for a lexiscan card study and unable to lie under the camera due to SOB patient on constant O2.  Dr. Flores Inch aware

## 2017-08-04 NOTE — PROGRESS NOTES
[facility]  [phone]    This note will not be viewable in [address] Reason for consult: establish care  Requesting physician: Dr. Marly Carlisle  **behavior towards staff  Patient adamantly declines PA/NP visits    HPI: Tanya Varela, a 64y.o. year-old who presents for evaluation of hypoxia and possible CAD. Complicated hx and continues to have a lot of symptoms. Discussed the hospitalization and unclear cause of her rapid decompensation but incongruous. Ex \"I dont really have a breathing problem. \" stated while wearing portable O2. Hospitalization in march   On oxygen since 12/16. No clear pathology. Following with pulmonary for alveolar damage, sees Pulmonary. Apparently had sinus infection in 12/16 and started coughing and has been on oxygen ever since.  has been worried about her memory trouble. Has been told the memory problem is hypoxic brain injury related to initial presentation in 12/16 with sats in the 76range. Naoma Broccoli Dyspnea with exertion. Will get echo today. Attempted to get stress today but pt did not complete it. May not be possible at this point. Will review pulmonary records. Assessment/Plan:  1. Positive Troponin, nonspecific: Could be respiratory failure related or due to CAD. Plan to check stress Nuc lexiscan study to r/o CAD. Continue ASA. Pt chose not to complete the test.   2. Peripheral Edema: Will start Lasix 40 BID for 3 days and then daily. KCL daily. Stop Maxzide  3. HTN: Stopped Maxzide. Started Lasix for swelling, Now on metoprolol, high today and at the doc office, but at home 120-130/70-80  4. Dyslipidemia: She has been told by her Rheumatologist, Dr. Mali Braswell that she could not take statins with the orencia  5. Respiratory failure on home O2. Had hypoxia and hypercarbia. 6. Pericardial effusion- follow-up  7. Pulmonary artery dilation/PA HTN by Chest CT, echo without significant findings, Dr. [place] pulmonary.   8.  Memory loss- reportedly related to the hypoxia and is seeing Neurology. 9. Rheumatoid arthritis. Discussed cardiac implications  9. Body mass index is 46.22 kg/(m^2). work on diet and exercise, Weight gain 25 pounds  in the last 6 months which she is very frustrated but does not seem to think that her weight 25 pounds ago was an issue. Dad  at 52 of MI multiple in the 45s, also with CVA and blood clot in the brain, Mother with pacemaker, bro ans sister with Bp meds  Soc hx tob, quit 1995 11/10  She  has a past medical history of Hypertension and RA (rheumatoid arthritis) ([facility] [address] Cardiovascular ROS: no chest pain or dyspnea on exertion  Respiratory ROS: no cough, shortness of breath, or wheezing  Neurological ROS: no TIA or stroke symptoms  All other systems negative except as above. PE  Vitals:    17 0909   BP: 160/86   Pulse: 80   Resp: 16   Weight: 271 lb 6.4 oz (123.1 kg)   Height: 5' 4.25\" (1.632 m)    Body mass index is 46.22 kg/(m^2).    General appearance - alert, wearing o2 nc and in no distress  Mental status - affect appropriate to mood,  Eyes - sclera anicteric, moist mucous membranes  Neck - supple  Lymphatics - not assessed  Chest -, no wheezes, rales or rhonchi  Heart - distant heart sounds  Abdomen - obese soft, nontender, nondistended, no masses or organomegaly  Back exam - full range of motion, no tenderness  Neurological - cranial nerves II through XII grossly intact, no focal deficit  Musculoskeletal - no muscular tenderness noted, normal strength  Extremities - peripheral pulses normal, tr pedal edema  Skin - normal coloration  no rashes    12 lead ECG:    Recent Labs:  Lab Results   Component Value Date/Time    Cholesterol, total 273 2017 10:43 AM    HDL Cholesterol 62 2017 10:43 AM    LDL, calculated 188 2017 10:43 AM    Triglyceride 115 2017 10:43 AM     Lab Results   Component Value Date/Time    Creatinine 0.59 2017 10:01 AM     Lab Results   Component Value Date/Time    BUN 17 04/21/2017 10:01 AM     Lab Results   Component Value Date/Time    Potassium 3.8 04/21/2017 10:01 AM     No results found for: HBA1C, HGBE8, WSX9TLLW, FST0QNXA  Lab Results   Component Value Date/Time    HGB 14.1 04/21/2017 10:01 AM     Lab Results   Component Value Date/Time    PLATELET 465 75/01/0720 10:01 AM       Reviewed:  Past Medical History:   Diagnosis Date    Hypertension     RA (rheumatoid arthritis) ([facility] [address]      History   Smoking Status    Former Smoker   Smokeless Tobacco    Never Used     History   Alcohol Use    Yes     Comment: occasional     Allergies   Allergen Reactions    Bee Sting [Sting, Bee] Anaphylaxis    Codeine Hives and Shortness of Breath     bp drop     Methotrexate Nausea and Vomiting    Penicillins Hives and Shortness of Breath     bp drop       Current Outpatient Prescriptions   Medication Sig    aspirin delayed-release 81 mg tablet Take  by mouth daily.  acetaminophen (TYLENOL) 325 mg tablet Take  by mouth every four (4) hours as needed for Pain.  fluticasone-salmeterol (ADVAIR HFA) 115-21 mcg/actuation inhaler Take 2 Puffs by inhalation two (2) times a day.  furosemide (LASIX) 40 mg tablet Take 2 tabs once a day in the morning  Indications: Edema    potassium chloride SA (MICRO-K) 10 mEq capsule Take 1 cap daily    metoprolol succinate (TOPROL-XL) 25 mg XL tablet Take 1 Tab by mouth daily.  ergocalciferol (VITAMIN D2) 50,000 unit capsule Take 50,000 Units by mouth every seven (7) days. On Friday   Indications: VITAMIN D DEFICIENCY    vitamins  A,C,E-zinc-copper (OCUVITE PRESERVISION) [phone] unit-mg-unit tab tablet Take 1 Tab by mouth two (2) times a day.  EPINEPHrine (EPIPEN) 0.3 mg/0.3 mL (1:1,000) injection 0.3 mL by IntraMUSCular route once as needed. 2 pens    ORENCIA 125 mg/mL syrg 125 mg by SubCUTAneous route every seven (7) days.  On Saturday    cholecalciferol (VITAMIN D3) 1,000 unit tablet Take 2,000 Units by mouth daily. Except on Friday     No current facility-administered medications for this visit.       Former patient of Dr. Selene Rodriguez but lives on this side of town and wants to be followed in this office    Baby MD Dinh  [facility] [address] [place] [address] [address], [address] [place] [address],[address]  [place], [facility]  [phone]

## 2017-08-05 PROBLEM — I27.20 PULMONARY HYPERTENSION (HCC): Status: ACTIVE | Noted: 2017-08-05

## 2017-08-05 PROBLEM — M06.9 RHEUMATOID ARTHRITIS INVOLVING MULTIPLE SITES (HCC): Status: ACTIVE | Noted: 2017-08-05

## 2017-08-05 PROBLEM — J96.11 CHRONIC RESPIRATORY FAILURE WITH HYPOXIA AND HYPERCAPNIA (HCC): Status: ACTIVE | Noted: 2017-08-05

## 2017-08-05 PROBLEM — J96.12 CHRONIC RESPIRATORY FAILURE WITH HYPOXIA AND HYPERCAPNIA (HCC): Status: ACTIVE | Noted: 2017-08-05

## 2017-08-05 PROBLEM — G93.1 BRAIN HYPOXIC INJURY (HCC): Status: ACTIVE | Noted: 2017-08-05

## 2017-08-05 PROBLEM — I25.10 CORONARY ARTERY DISEASE INVOLVING NATIVE HEART: Status: ACTIVE | Noted: 2017-08-05

## 2017-08-05 PROBLEM — E78.5 DYSLIPIDEMIA, GOAL LDL BELOW 70: Status: ACTIVE | Noted: 2017-08-05

## 2017-08-05 PROBLEM — R41.3 MEMORY LOSS: Status: ACTIVE | Noted: 2017-08-05

## 2017-12-08 ENCOUNTER — OFFICE VISIT (OUTPATIENT)
Dept: INTERNAL MEDICINE CLINIC | Age: 62
End: 2017-12-08

## 2017-12-08 VITALS
SYSTOLIC BLOOD PRESSURE: 160 MMHG | HEIGHT: 64 IN | RESPIRATION RATE: 18 BRPM | WEIGHT: 283 LBS | HEART RATE: 82 BPM | OXYGEN SATURATION: 94 % | TEMPERATURE: 98.3 F | BODY MASS INDEX: 48.32 KG/M2 | DIASTOLIC BLOOD PRESSURE: 90 MMHG

## 2017-12-08 DIAGNOSIS — J96.11 CHRONIC RESPIRATORY FAILURE WITH HYPOXIA AND HYPERCAPNIA (HCC): ICD-10-CM

## 2017-12-08 DIAGNOSIS — Z23 NEED FOR TDAP VACCINATION: ICD-10-CM

## 2017-12-08 DIAGNOSIS — M05.79 RHEUMATOID ARTHRITIS INVOLVING MULTIPLE SITES WITH POSITIVE RHEUMATOID FACTOR (HCC): ICD-10-CM

## 2017-12-08 DIAGNOSIS — Z00.00 WELL WOMAN EXAM (NO GYNECOLOGICAL EXAM): Primary | ICD-10-CM

## 2017-12-08 DIAGNOSIS — Z12.11 COLON CANCER SCREENING: ICD-10-CM

## 2017-12-08 DIAGNOSIS — I10 HTN (HYPERTENSION), BENIGN: ICD-10-CM

## 2017-12-08 DIAGNOSIS — J96.12 CHRONIC RESPIRATORY FAILURE WITH HYPOXIA AND HYPERCAPNIA (HCC): ICD-10-CM

## 2017-12-08 DIAGNOSIS — Z12.39 SCREENING FOR BREAST CANCER: ICD-10-CM

## 2017-12-08 DIAGNOSIS — R06.02 SOB (SHORTNESS OF BREATH): ICD-10-CM

## 2017-12-08 PROBLEM — J96.01 ACUTE RESPIRATORY FAILURE WITH HYPOXIA (HCC): Status: RESOLVED | Noted: 2017-03-02 | Resolved: 2017-12-08

## 2017-12-08 RX ORDER — PREDNISONE 5 MG/1
TABLET ORAL
Refills: 0 | COMMUNITY
Start: 2017-10-27

## 2017-12-08 NOTE — PROGRESS NOTES
HISTORY OF PRESENT ILLNESS  Olivia Márquez is a 58 y.o. female. HPI   Health Maintenance   Topic Date Due    DTaP/Tdap/Td series (1 - Tdap) 11/10/1976    PAP AKA CERVICAL CYTOLOGY  11/10/1976    BREAST CANCER SCRN MAMMOGRAM  11/10/2005    FOBT Q 1 YEAR AGE 50-75  11/10/2005    ZOSTER VACCINE AGE 60>  09/10/2015    Hepatitis C Screening  Completed    Influenza Age 5 to Adult  Addressed       Cardiovascular Review:  The patient has hypertension and hyperlipidemia. Body mass index is Body mass index is 48.2 kg/(m^2). Has been seeing by Dr. Ryan Higgins records reviewed. Diet and Lifestyle: nonsmoker  Home BP Monitoring: is not measured at home. Pertinent ROS: taking medications as instructed, no medication side effects noted, no TIA's, no chest pain on exertion, no dyspnea on exertion, no swelling of ankles.      Hypoxia  She has seen Pulmonary 12/08/17 had repeat PFTs. Modifications were made to her oxygen delivery system. She will also be starting Pulmonary rehab.      Neurology Review  Has Headache/ Memory Loss/ Tremors. Symptoms have been stable. Has seen Neurology. Review of Systems   Constitutional: Negative for diaphoresis, fever and weight loss. HENT: Positive for sore throat (started after albuterol use. She can no longer sing. She was a foremer smoker. ). Eyes: Negative for blurred vision and pain. Respiratory: Negative for shortness of breath. Cardiovascular: Negative for chest pain, orthopnea and leg swelling. Neurological: Negative for focal weakness and headaches. Psychiatric/Behavioral: Negative for depression.      Patient Active Problem List    Diagnosis Date Noted    Chronic respiratory failure with hypoxia and hypercapnia (HCC) 08/05/2017    Class 3 obesity with serious comorbidity and body mass index (BMI) 45.0 to 49.9 in adult 08/05/2017    Rheumatoid arthritis involving multiple sites ([facility] [address] 08/05/2017    Pulmonary hypertension 08/05/2017    Dyslipidemia, goal LDL below 70 08/05/2017    Coronary artery disease involving native heart 08/05/2017    Memory loss 08/05/2017    Brain hypoxic injury ([facility] 08/05/2017    Edema 03/24/2017    Acute encephalopathy 03/04/2017    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  acetaminophen (TYLENOL) 325 mg tablet Take  by mouth every four (4) hours as needed for Pain.  fluticasone-salmeterol (ADVAIR HFA) 115-21 mcg/actuation inhaler Take 2 Puffs by inhalation two (2) times a day.  furosemide (LASIX) 40 mg tablet Take 2 tabs once a day in the morning  Indications: Edema 30 Tab 0    potassium chloride SA (MICRO-K) 10 mEq capsule Take 1 cap daily 90 Cap 3    metoprolol succinate (TOPROL-XL) 25 mg XL tablet Take 1 Tab by mouth daily. 90 Tab 3    ergocalciferol (VITAMIN D2) 50,000 unit capsule Take 50,000 Units by mouth every seven (7) days. On Friday   Indications: VITAMIN D DEFICIENCY      vitamins  A,C,E-zinc-copper (OCUVITE PRESERVISION) [phone] unit-mg-unit tab tablet Take 1 Tab by mouth two (2) times a day.  EPINEPHrine (EPIPEN) 0.3 mg/0.3 mL (1:1,000) injection 0.3 mL by IntraMUSCular route once as needed. 2 pens 0.6 mL 0    ORENCIA 125 mg/mL syrg 125 mg by SubCUTAneous route every seven (7) days. On Saturday      cholecalciferol (VITAMIN D3) 1,000 unit tablet Take 2,000 Units by mouth daily.  Except on Friday         Allergies   Allergen Reactions    Bee Sting [Sting, Bee] Anaphylaxis    Codeine Hives and Shortness of Breath     bp drop     Methotrexate Nausea and Vomiting    Penicillins Hives and Shortness of Breath     bp drop      Visit Vitals    /90 (BP 1 Location: Right arm, BP Patient Position: Sitting)    Pulse 82    Temp 98.3 °F (36.8 °C) (Oral)    Resp 18    Ht 5' 4.25\" (1.632 m)    Wt 283 lb (128.4 kg)    SpO2 94%    BMI 48.2 kg/m2       Physical Exam   Constitutional: She is oriented to person, place, and time. She appears well-developed. No distress. Eyes: Conjunctivae are normal. Pupils are equal, round, and reactive to light. Neck: Neck supple. No thyromegaly present. Cardiovascular: Normal rate, regular rhythm and normal heart sounds. Pulmonary/Chest: Effort normal and breath sounds normal. No respiratory distress. She has no wheezes. She has no rales. She exhibits no tenderness. Nasal Cannula in place    Musculoskeletal: She exhibits edema (1+ BLE ). Lymphadenopathy:     She has no cervical adenopathy. Neurological: She is alert and oriented to person, place, and time. She displays tremor (BUE: Coarse tremor ). Skin: Skin is warm and dry. Psychiatric: She has a normal mood and affect. ASSESSMENT and PLAN  Diagnoses and all orders for this visit:    1. Well woman exam (no gynecological exam)- Health Maintenance reviewed and addressed as ordered below     Health Maintenance   Topic Date Due    BREAST CANCER SCRN MAMMOGRAM  02/16/2018 (Originally 11/10/2005)    FOBT Q 1 YEAR AGE 50-75  03/16/2018 (Originally 11/10/2005)    PAP AKA CERVICAL CYTOLOGY  03/01/2020    DTaP/Tdap/Td series (2 - Td) 12/08/2027    Hepatitis C Screening  Completed    ZOSTER VACCINE AGE 60>  Addressed    Influenza Age 5 to Adult  Addressed       -     CBC WITH AUTOMATED DIFF  -     LIPID PANEL  -     METABOLIC PANEL, COMPREHENSIVE    2. HTN (hypertension), benign- . sBP elevated. Known white coat BP elevation. Closely followed by cardiology.      3. SOB (shortness of breath)- being evaluated by Cardiology and Pulmonary. 4. Rheumatoid arthritis involving multiple sites with positive rheumatoid factor ([facility] [address]- per rheumatology     5. Chronic respiratory failure with hypoxia and hypercapnia (HCC)    6. Need for Tdap vaccination- received in clinic without complication  -     TETANUS, DIPHTHERIA TOXOIDS AND ACELLULAR PERTUSSIS VACCINE (TDAP), IN INDIVIDS. >=7, IM  -     ME IMMUNIZ ADMIN,1 SINGLE/COMB VAC/TOXOID    7. Screening for breast cancer  -     [facility] MAMMO BI SCREENING INCL CAD; Future    8. Colon cancer screening-  Needs colon cancer screening risks and benefits of various modalities reviewed. Pt opted for annual stool testing which medically appropriate  with the understanding that she will need a colonoscopy if the FOBT is positive. -     OCCULT BLOOD, IMMUNOASSAY (FIT)      Follow-up Disposition:  Return in about 6 months (around 6/8/2018) for Follow-up. Medication risks/benefits/costs/interactions/alternatives discussed with patient. Lilly Smith  was given an after visit summary which includes diagnoses, current medications, & vitals. she expressed understanding with the diagnosis and plan.

## 2017-12-08 NOTE — PROGRESS NOTES
Chief Complaint   Patient presents with    Complete Physical     1. Have you been to the ER, urgent care clinic since your last visit? Hospitalized since your last visit? No    2. Have you seen or consulted any other health care providers outside of the [address] [place] [address] since your last visit? Include any pap smears or colon screening.  Yes Where: Pulmonary  Reason for visit: Follow up/Rheumatologist-follow up

## 2017-12-08 NOTE — PATIENT INSTRUCTIONS
It was a pleasure to see you! As discussed:    I have ordered your age appropriate labs please complete them. You will need to fast 10-12hrs before your lab appointment. Complete labs two weeks before your next appointment. Well Visit, Women 48 to 72: Care Instructions  Your Care Instructions  Physical exams can help you stay healthy. Your doctor has checked your overall health and may have suggested ways to take good care of yourself. He or she also may have recommended tests. At home, you can help prevent illness with healthy eating, regular exercise, and other steps. Follow-up care is a key part of your treatment and safety. Be sure to make and go to all appointments, and call your doctor if you are having problems. It's also a good idea to know your test results and keep a list of the medicines you take. How can you care for yourself at home? · Reach and stay at a healthy weight. This will lower your risk for many problems, such as obesity, diabetes, heart disease, and high blood pressure. · Get at least 30 minutes of exercise on most days of the week. Walking is a good choice. You also may want to do other activities, such as running, swimming, cycling, or playing tennis or team sports. · Do not smoke. Smoking can make health problems worse. If you need help quitting, talk to your doctor about stop-smoking programs and medicines. These can increase your chances of quitting for good. · Protect your skin from too much sun. When you're outdoors from 10 a.m. to 4 p.m., stay in the shade or cover up with clothing and a hat with a wide brim. Wear sunglasses that block UV rays. Even when it's cloudy, put broad-spectrum sunscreen (SPF 30 or higher) on any exposed skin. · See a dentist one or two times a year for checkups and to have your teeth cleaned. · Wear a seat belt in the car. · Limit alcohol to 1 drink a day. Too much alcohol can cause health problems.   Follow your doctor's advice about when to have certain tests. These tests can spot problems early. · Cholesterol. Your doctor will tell you how often to have this done based on your age, family history, or other things that can increase your risk for heart attack and stroke. · Blood pressure. Have your blood pressure checked during a routine doctor visit. Your doctor will tell you how often to check your blood pressure based on your age, your blood pressure results, and other factors. · Mammogram. Ask your doctor how often you should have a mammogram, which is an X-ray of your breasts. A mammogram can spot breast cancer before it can be felt and when it is easiest to treat. · Pap test and pelvic exam. Ask your doctor how often you should have a Pap test. You may not need to have a Pap test as often as you used to. · Vision. Have your eyes checked every year or two or as often as your doctor suggests. Some experts recommend that you have yearly exams for glaucoma and other age-related eye problems starting at age 48. · Hearing. Tell your doctor if you notice any change in your hearing. You can have tests to find out how well you hear. · Diabetes. Ask your doctor whether you should have tests for diabetes. · Colon cancer. You should begin tests for colon cancer at age 48. You may have one of several tests. Your doctor will tell you how often to have tests based on your age and risk. Risks include whether you already had a precancerous polyp removed from your colon or whether your parents, sisters and brothers, or children have had colon cancer. · Thyroid disease. Talk to your doctor about whether to have your thyroid checked as part of a regular physical exam. Women have an increased chance of a thyroid problem. · Osteoporosis. You should begin tests for bone density at age 72. If you are younger than 72, ask your doctor whether you have factors that may increase your risk for this disease. You may want to have this test before age 72.   · Heart attack and stroke risk. At least every 4 to 6 years, you should have your risk for heart attack and stroke assessed. Your doctor uses factors such as your age, blood pressure, cholesterol, and whether you smoke or have diabetes to show what your risk for a heart attack or stroke is over the next 10 years. When should you call for help? Watch closely for changes in your health, and be sure to contact your doctor if you have any problems or symptoms that concern you. Where can you learn more? Go to http://jose-munira.info/. Enter S198 in the search box to learn more about \"Well Visit, Women 50 to 72: Care Instructions. \"  Current as of: May 12, 2017  Content Version: 11.4  © 3833-6829 Healthwise[employer]. Care instructions adapted under license by [employer] (which disclaims liability or warranty for this information). If you have questions about a medical condition or this instruction, always ask your healthcare professional. [place] [address] any warranty or liability for your use of this information.

## 2017-12-08 NOTE — MR AVS SNAPSHOT
Visit Information Date & Time Provider Department Dept. Phone Encounter #  
 12/8/2017  2:00 PM Alise Guzman MD [address] [id number] Internal Medicine [phone] [id number] Follow-up Instructions Return in about 6 months (around 6/8/2018) for Follow-up. Upcoming Health Maintenance Date Due  
 BREAST CANCER SCRN MAMMOGRAM 2/16/2018* FOBT Q 1 YEAR AGE 50-75 3/16/2018* PAP AKA CERVICAL CYTOLOGY 3/1/2020 DTaP/Tdap/Td series (2 - Td) 12/8/2027 *Topic was postponed. The date shown is not the original due date. Allergies as of 12/8/2017  Review Complete On: 12/8/2017 By: Alise Guzman MD  
  
 Severity Noted Reaction Type Reactions Bee Sting [Sting, Bee] High 03/23/2016    Anaphylaxis Codeine High 03/23/2016    Hives, Shortness of Breath  
 bp drop Methotrexate  03/23/2016    Nausea and Vomiting Penicillins  03/23/2016    Hives, Shortness of Breath  
 bp drop Current Immunizations  Never Reviewed Name Date Tdap  Incomplete Not reviewed this visit You Were Diagnosed With   
  
 Codes Comments Well woman exam (no gynecological exam)    -  Primary ICD-10-CM: Z00.00 ICD-9-CM: V70.0   
 HTN (hypertension), benign     ICD-10-CM: I10 
ICD-9-CM: 401.1 SOB (shortness of breath)     ICD-10-CM: R06.02 
ICD-9-CM: 786.05 Rheumatoid arthritis involving multiple sites with positive rheumatoid factor (HCC)     ICD-10-CM: M05.79 ICD-9-CM: 714.0 Chronic respiratory failure with hypoxia and hypercapnia (HCC)     ICD-10-CM: J96.11, J96.12 
ICD-9-CM: 518.83, 799.02, 786.09 Need for Tdap vaccination     ICD-10-CM: R28 ICD-9-CM: V06.1 Screening for breast cancer     ICD-10-CM: Z12.31 
ICD-9-CM: V76.10 Colon cancer screening     ICD-10-CM: Z12.11 ICD-9-CM: V76.51 Vitals BP Pulse Temp Resp Height(growth percentile) Weight(growth percentile)  160/90 (BP 1 Location: Right arm, BP Patient Position: Sitting) 82 98.3 °F (36.8 °C) (Oral) 18 5' 4.25\" (1.632 m) 283 lb (128.4 kg) LMP SpO2 BMI OB Status Smoking Status 02/23/2013 94% 48.2 kg/m2 Postmenopausal Former Smoker Vitals History BMI and BSA Data Body Mass Index Body Surface Area  
 48.2 kg/m 2 2.41 m 2 Preferred Pharmacy Pharmacy Name [address] [facility] [phone] Your Updated Medication List  
  
   
This list is accurate as of: 12/8/17  2:53 PM.  Always use your most recent med list.  
  
  
  
  
 Brenna Sat -21 mcg/actuation inhaler Generic drug:  fluticasone-salmeterol Take 2 Puffs by inhalation two (2) times a day. aspirin delayed-release 81 mg tablet Take  by mouth daily. cholecalciferol 1,000 unit tablet Commonly known as:  VITAMIN D3 Take 2,000 Units by mouth daily. Except on Friday EPINEPHrine 0.3 mg/0.3 mL injection Commonly known as:  EPIPEN  
0.3 mL by IntraMUSCular route once as needed. 2 pens  
  
 furosemide 40 mg tablet Commonly known as:  LASIX Take 2 tabs once a day in the morning  Indications: Edema  
  
 metoprolol succinate 25 mg XL tablet Commonly known as:  TOPROL-XL Take 1 Tab by mouth daily. Ocuvite PreserVision [phone] unit-mg-unit Tab tablet Generic drug:  vitamins  A,C,E-zinc-copper Take 1 Tab by mouth two (2) times a day. ORENCIA 125 mg/mL Syrg Generic drug:  abatacept 125 mg by SubCUTAneous route every seven (7) days. On Saturday  
  
 potassium chloride SA 10 mEq capsule Commonly known as:  Mick Duane Take 1 cap daily  
  
 predniSONE 5 mg tablet Commonly known as:  DELTASONE  
TK 1 TO 2 TS PO QD PRN  
  
 TYLENOL 325 mg tablet Generic drug:  acetaminophen Take  by mouth every four (4) hours as needed for Pain. VITAMIN D2 50,000 unit capsule Generic drug:  ergocalciferol Take 50,000 Units by mouth every seven (7) days.  On Friday   Indications: VITAMIN D DEFICIENCY We Performed the Following CBC WITH AUTOMATED DIFF [68264 CPT(R)] LIPID PANEL [09770 CPT(R)] METABOLIC PANEL, COMPREHENSIVE [26248 CPT(R)] OCCULT BLOOD, IMMUNOASSAY (FIT) [id number] CPT(R)] TX IMMUNIZ ADMIN,1 SINGLE/COMB VAC/TOXOID [id number] CPT(R)] TETANUS, DIPHTHERIA TOXOIDS AND ACELLULAR PERTUSSIS VACCINE (TDAP), IN INDIVIDS. >=7, IM [id number] CPT(R)] Follow-up Instructions Return in about 6 months (around 6/8/2018) for Follow-up. To-Do List   
 06/10/2018 Imaging:  [facility] MAMMO BI SCREENING INCL CAD Patient Instructions It was a pleasure to see you! As discussed: 
 
I have ordered your age appropriate labs please complete them. You will need to fast 10-12hrs before your lab appointment. Complete labs two weeks before your next appointment. Well Visit, Women 48 to 72: Care Instructions Your Care Instructions Physical exams can help you stay healthy. Your doctor has checked your overall health and may have suggested ways to take good care of yourself. He or she also may have recommended tests. At home, you can help prevent illness with healthy eating, regular exercise, and other steps. Follow-up care is a key part of your treatment and safety. Be sure to make and go to all appointments, and call your doctor if you are having problems. It's also a good idea to know your test results and keep a list of the medicines you take. How can you care for yourself at home? · Reach and stay at a healthy weight. This will lower your risk for many problems, such as obesity, diabetes, heart disease, and high blood pressure. · Get at least 30 minutes of exercise on most days of the week. Walking is a good choice. You also may want to do other activities, such as running, swimming, cycling, or playing tennis or team sports. · Do not smoke. Smoking can make health problems worse.  If you need help quitting, talk to your doctor about stop-smoking programs and medicines. These can increase your chances of quitting for good. · Protect your skin from too much sun. When you're outdoors from 10 a.m. to 4 p.m., stay in the shade or cover up with clothing and a hat with a wide brim. Wear sunglasses that block UV rays. Even when it's cloudy, put broad-spectrum sunscreen (SPF 30 or higher) on any exposed skin. · See a dentist one or two times a year for checkups and to have your teeth cleaned. · Wear a seat belt in the car. · Limit alcohol to 1 drink a day. Too much alcohol can cause health problems. Follow your doctor's advice about when to have certain tests. These tests can spot problems early. · Cholesterol. Your doctor will tell you how often to have this done based on your age, family history, or other things that can increase your risk for heart attack and stroke. · Blood pressure. Have your blood pressure checked during a routine doctor visit. Your doctor will tell you how often to check your blood pressure based on your age, your blood pressure results, and other factors. · Mammogram. Ask your doctor how often you should have a mammogram, which is an X-ray of your breasts. A mammogram can spot breast cancer before it can be felt and when it is easiest to treat. · Pap test and pelvic exam. Ask your doctor how often you should have a Pap test. You may not need to have a Pap test as often as you used to. · Vision. Have your eyes checked every year or two or as often as your doctor suggests. Some experts recommend that you have yearly exams for glaucoma and other age-related eye problems starting at age 48. · Hearing. Tell your doctor if you notice any change in your hearing. You can have tests to find out how well you hear. · Diabetes. Ask your doctor whether you should have tests for diabetes. · Colon cancer. You should begin tests for colon cancer at age 48.  You may have one of several tests. Your doctor will tell you how often to have tests based on your age and risk. Risks include whether you already had a precancerous polyp removed from your colon or whether your parents, sisters and brothers, or children have had colon cancer. · Thyroid disease. Talk to your doctor about whether to have your thyroid checked as part of a regular physical exam. Women have an increased chance of a thyroid problem. · Osteoporosis. You should begin tests for bone density at age 72. If you are younger than 72, ask your doctor whether you have factors that may increase your risk for this disease. You may want to have this test before age 72. · Heart attack and stroke risk. At least every 4 to 6 years, you should have your risk for heart attack and stroke assessed. Your doctor uses factors such as your age, blood pressure, cholesterol, and whether you smoke or have diabetes to show what your risk for a heart attack or stroke is over the next 10 years. When should you call for help? Watch closely for changes in your health, and be sure to contact your doctor if you have any problems or symptoms that concern you. Where can you learn more? Go to http://jose-munira.info/. Enter T858 in the search box to learn more about \"Well Visit, Women 50 to 72: Care Instructions. \" Current as of: May 12, 2017 Content Version: 11.4 © 6150-8822 [employer]. Care instructions adapted under license by [employer] (which disclaims liability or warranty for this information). If you have questions about a medical condition or this instruction, always ask your healthcare professional. [place] [address] any warranty or liability for your use of this information. Introducing [facility]! Dear Rm Bah: Thank you for requesting a [employer] account. Our records indicate that you already have an active [employer] account.   You can access your account anytime at [email or web address] Did you know that you can access your hospital and ER discharge instructions at any time in [email or web address]? You can also review all of your test results from your hospital stay or ER visit. Additional Information If you have questions, please visit the Frequently Asked Questions section of the [email or web address] website at [email or web address] Remember, [email or web address] is NOT to be used for urgent needs. For medical emergencies, dial 911. Now available from your iPhone and Android! Please provide this summary of care documentation to your next provider. Your primary care clinician is listed as Shanell Go. If you have any questions after today's visit, please call [phone].

## 2018-03-06 ENCOUNTER — HOSPITAL ENCOUNTER (OUTPATIENT)
Dept: GENERAL RADIOLOGY | Age: 63
Discharge: HOME | End: 2018-03-06
Payer: COMMERCIAL

## 2018-03-06 DIAGNOSIS — R06.02 SOBOE (SHORTNESS OF BREATH ON EXERTION): ICD-10-CM

## 2018-03-06 PROCEDURE — 71046 X-RAY EXAM CHEST 2 VIEWS: CPT

## 2018-03-29 ENCOUNTER — HOSPITAL ENCOUNTER (INPATIENT)
Age: 63
LOS: 5 days | Discharge: HOME | DRG: 189 | End: 2018-04-03
Attending: EMERGENCY MEDICINE | Admitting: INTERNAL MEDICINE
Payer: COMMERCIAL

## 2018-03-29 ENCOUNTER — APPOINTMENT (OUTPATIENT)
Dept: GENERAL RADIOLOGY | Age: 63
DRG: 189 | End: 2018-03-29
Attending: EMERGENCY MEDICINE
Payer: COMMERCIAL

## 2018-03-29 DIAGNOSIS — R53.81 DEBILITY: ICD-10-CM

## 2018-03-29 DIAGNOSIS — Z71.89 GOALS OF CARE, COUNSELING/DISCUSSION: ICD-10-CM

## 2018-03-29 DIAGNOSIS — J96.01 ACUTE RESPIRATORY FAILURE WITH HYPOXIA AND HYPERCAPNIA (HCC): Primary | ICD-10-CM

## 2018-03-29 DIAGNOSIS — J96.02 ACUTE RESPIRATORY FAILURE WITH HYPOXIA AND HYPERCAPNIA (HCC): Primary | ICD-10-CM

## 2018-03-29 DIAGNOSIS — R06.89 CO2 NARCOSIS: ICD-10-CM

## 2018-03-29 DIAGNOSIS — Z71.89 DNR (DO NOT RESUSCITATE) DISCUSSION: ICD-10-CM

## 2018-03-29 DIAGNOSIS — R06.02 SOB (SHORTNESS OF BREATH): ICD-10-CM

## 2018-03-29 PROBLEM — J96.21 ACUTE ON CHRONIC RESPIRATORY FAILURE WITH HYPOXIA AND HYPERCAPNIA (HCC): Status: ACTIVE | Noted: 2017-08-05

## 2018-03-29 PROBLEM — G93.1 BRAIN HYPOXIC INJURY (HCC): Status: RESOLVED | Noted: 2017-08-05 | Resolved: 2018-03-29

## 2018-03-29 PROBLEM — I31.39 PERICARDIAL EFFUSION: Status: RESOLVED | Noted: 2017-03-02 | Resolved: 2018-03-29

## 2018-03-29 PROBLEM — R41.3 MEMORY LOSS: Status: RESOLVED | Noted: 2017-08-05 | Resolved: 2018-03-29

## 2018-03-29 PROBLEM — R77.8 ELEVATED TROPONIN: Status: RESOLVED | Noted: 2017-03-02 | Resolved: 2018-03-29

## 2018-03-29 PROBLEM — G25.3 MULTIFOCAL MYOCLONUS: Status: RESOLVED | Noted: 2017-03-04 | Resolved: 2018-03-29

## 2018-03-29 PROBLEM — M06.9 RHEUMATOID ARTHRITIS INVOLVING MULTIPLE SITES (HCC): Status: RESOLVED | Noted: 2017-08-05 | Resolved: 2018-03-29

## 2018-03-29 PROBLEM — I10 HTN (HYPERTENSION), BENIGN: Status: RESOLVED | Noted: 2017-03-02 | Resolved: 2018-03-29

## 2018-03-29 PROBLEM — J96.22 ACUTE ON CHRONIC RESPIRATORY FAILURE WITH HYPOXIA AND HYPERCAPNIA (HCC): Status: ACTIVE | Noted: 2017-08-05

## 2018-03-29 PROBLEM — R60.9 EDEMA: Status: RESOLVED | Noted: 2017-03-24 | Resolved: 2018-03-29

## 2018-03-29 LAB
ALBUMIN SERPL-MCNC: 2.9 G/DL (ref 3.5–5)
ALBUMIN/GLOB SERPL: 0.7 {RATIO} (ref 1.1–2.2)
ALP SERPL-CCNC: 98 U/L (ref 45–117)
ALT SERPL-CCNC: 32 U/L (ref 12–78)
ANION GAP SERPL CALC-SCNC: ABNORMAL MMOL/L (ref 5–15)
ARTERIAL PATENCY WRIST A: YES
ARTERIAL PATENCY WRIST A: YES
AST SERPL-CCNC: 19 U/L (ref 15–37)
BASE EXCESS BLDA CALC-SCNC: 16.2 MMOL/L
BASE EXCESS BLDA CALC-SCNC: 20.5 MMOL/L
BASOPHILS # BLD: 0 K/UL (ref 0–0.1)
BASOPHILS NFR BLD: 0 % (ref 0–1)
BDY SITE: ABNORMAL
BDY SITE: ABNORMAL
BILIRUB SERPL-MCNC: 0.3 MG/DL (ref 0.2–1)
BNP SERPL-MCNC: 184 PG/ML (ref 0–125)
BUN SERPL-MCNC: 9 MG/DL (ref 6–20)
BUN/CREAT SERPL: 21 (ref 12–20)
CALCIUM SERPL-MCNC: 9.1 MG/DL (ref 8.5–10.1)
CHLORIDE SERPL-SCNC: 98 MMOL/L (ref 97–108)
CK SERPL-CCNC: 107 U/L (ref 26–192)
CO2 SERPL-SCNC: >45 MMOL/L (ref 21–32)
CREAT SERPL-MCNC: 0.43 MG/DL (ref 0.55–1.02)
DIFFERENTIAL METHOD BLD: ABNORMAL
EOSINOPHIL # BLD: 0.1 K/UL (ref 0–0.4)
EOSINOPHIL NFR BLD: 2 % (ref 0–7)
EPAP/CPAP/PEEP, PAPEEP: 6
EPAP/CPAP/PEEP, PAPEEP: 6
ERYTHROCYTE [DISTWIDTH] IN BLOOD BY AUTOMATED COUNT: 14.5 % (ref 11.5–14.5)
FIO2 ON VENT: 55 %
FIO2 ON VENT: 60 %
GLOBULIN SER CALC-MCNC: 4.4 G/DL (ref 2–4)
GLUCOSE SERPL-MCNC: 94 MG/DL (ref 65–100)
HCO3 BLDA-SCNC: 52 MMOL/L (ref 22–26)
HCO3 BLDA-SCNC: 56 MMOL/L (ref 22–26)
HCT VFR BLD AUTO: 42 % (ref 35–47)
HGB BLD-MCNC: 11.7 G/DL (ref 11.5–16)
IMM GRANULOCYTES # BLD: 0.1 K/UL (ref 0–0.04)
IMM GRANULOCYTES NFR BLD AUTO: 1 % (ref 0–0.5)
IPAP/PIP, IPAPIP: 12
IPAP/PIP, IPAPIP: 14
LACTATE SERPL-SCNC: 0.4 MMOL/L (ref 0.4–2)
LYMPHOCYTES # BLD: 1.4 K/UL (ref 0.8–3.5)
LYMPHOCYTES NFR BLD: 20 % (ref 12–49)
MCH RBC QN AUTO: 28.4 PG (ref 26–34)
MCHC RBC AUTO-ENTMCNC: 27.9 G/DL (ref 30–36.5)
MCV RBC AUTO: 101.9 FL (ref 80–99)
MONOCYTES # BLD: 0.5 K/UL (ref 0–1)
MONOCYTES NFR BLD: 8 % (ref 5–13)
NEUTS SEG # BLD: 4.7 K/UL (ref 1.8–8)
NEUTS SEG NFR BLD: 69 % (ref 32–75)
NRBC # BLD: 0 K/UL (ref 0–0.01)
NRBC BLD-RTO: 0 PER 100 WBC
PCO2 BLDA: 140 MMHG (ref 35–45)
PCO2 BLDA: 140 MMHG (ref 35–45)
PH BLDA: 7.18 [PH] (ref 7.35–7.45)
PH BLDA: 7.22 [PH] (ref 7.35–7.45)
PLATELET # BLD AUTO: 198 K/UL (ref 150–400)
PMV BLD AUTO: 9.4 FL (ref 8.9–12.9)
PO2 BLDA: 66 MMHG (ref 80–100)
PO2 BLDA: 79 MMHG (ref 80–100)
POTASSIUM SERPL-SCNC: 4.4 MMOL/L (ref 3.5–5.1)
PRESSURE SUPPORT SETTING VENT: 6 CM[H2O]
PRESSURE SUPPORT SETTING VENT: 8 CM[H2O]
PROT SERPL-MCNC: 7.3 G/DL (ref 6.4–8.2)
RBC # BLD AUTO: 4.12 M/UL (ref 3.8–5.2)
RBC MORPH BLD: ABNORMAL
SAO2 % BLD: 86 % (ref 92–97)
SAO2 % BLD: 91 % (ref 92–97)
SAO2% DEVICE SAO2% SENSOR NAME: ABNORMAL
SAO2% DEVICE SAO2% SENSOR NAME: ABNORMAL
SERVICE CMNT-IMP: ABNORMAL
SERVICE CMNT-IMP: ABNORMAL
SODIUM SERPL-SCNC: 146 MMOL/L (ref 136–145)
SPECIMEN SITE: ABNORMAL
SPECIMEN SITE: ABNORMAL
TROPONIN I SERPL-MCNC: <0.04 NG/ML
VENTILATION MODE VENT: ABNORMAL
WBC # BLD AUTO: 6.8 K/UL (ref 3.6–11)

## 2018-03-29 PROCEDURE — 51701 INSERT BLADDER CATHETER: CPT

## 2018-03-29 PROCEDURE — 65660000000 HC RM CCU STEPDOWN

## 2018-03-29 PROCEDURE — 84484 ASSAY OF TROPONIN QUANT: CPT | Performed by: EMERGENCY MEDICINE

## 2018-03-29 PROCEDURE — 82803 BLOOD GASES ANY COMBINATION: CPT | Performed by: EMERGENCY MEDICINE

## 2018-03-29 PROCEDURE — 82550 ASSAY OF CK (CPK): CPT | Performed by: EMERGENCY MEDICINE

## 2018-03-29 PROCEDURE — 83880 ASSAY OF NATRIURETIC PEPTIDE: CPT | Performed by: EMERGENCY MEDICINE

## 2018-03-29 PROCEDURE — 36600 WITHDRAWAL OF ARTERIAL BLOOD: CPT | Performed by: EMERGENCY MEDICINE

## 2018-03-29 PROCEDURE — 83605 ASSAY OF LACTIC ACID: CPT | Performed by: EMERGENCY MEDICINE

## 2018-03-29 PROCEDURE — 93005 ELECTROCARDIOGRAM TRACING: CPT

## 2018-03-29 PROCEDURE — 36415 COLL VENOUS BLD VENIPUNCTURE: CPT | Performed by: EMERGENCY MEDICINE

## 2018-03-29 PROCEDURE — 94660 CPAP INITIATION&MGMT: CPT

## 2018-03-29 PROCEDURE — 94761 N-INVAS EAR/PLS OXIMETRY MLT: CPT

## 2018-03-29 PROCEDURE — 74011250636 HC RX REV CODE- 250/636: Performed by: INTERNAL MEDICINE

## 2018-03-29 PROCEDURE — 80053 COMPREHEN METABOLIC PANEL: CPT | Performed by: EMERGENCY MEDICINE

## 2018-03-29 PROCEDURE — 85025 COMPLETE CBC W/AUTO DIFF WBC: CPT | Performed by: EMERGENCY MEDICINE

## 2018-03-29 PROCEDURE — 87040 BLOOD CULTURE FOR BACTERIA: CPT | Performed by: EMERGENCY MEDICINE

## 2018-03-29 PROCEDURE — 99285 EMERGENCY DEPT VISIT HI MDM: CPT

## 2018-03-29 PROCEDURE — 71045 X-RAY EXAM CHEST 1 VIEW: CPT

## 2018-03-29 RX ORDER — SODIUM CHLORIDE 0.9 % (FLUSH) 0.9 %
5-10 SYRINGE (ML) INJECTION AS NEEDED
Status: DISCONTINUED | OUTPATIENT
Start: 2018-03-29 | End: 2018-04-03 | Stop reason: HOSPADM

## 2018-03-29 RX ORDER — ACETAMINOPHEN 325 MG/1
650 TABLET ORAL
Status: DISCONTINUED | OUTPATIENT
Start: 2018-03-29 | End: 2018-04-03 | Stop reason: HOSPADM

## 2018-03-29 RX ORDER — FLUTICASONE PROPIONATE AND SALMETEROL 250; 50 UG/1; UG/1
1 POWDER RESPIRATORY (INHALATION) EVERY 12 HOURS
COMMUNITY

## 2018-03-29 RX ORDER — ONDANSETRON 2 MG/ML
4 INJECTION INTRAMUSCULAR; INTRAVENOUS
Status: DISCONTINUED | OUTPATIENT
Start: 2018-03-29 | End: 2018-04-03 | Stop reason: HOSPADM

## 2018-03-29 RX ORDER — ENOXAPARIN SODIUM 100 MG/ML
40 INJECTION SUBCUTANEOUS EVERY 12 HOURS
Status: DISCONTINUED | OUTPATIENT
Start: 2018-03-29 | End: 2018-04-03 | Stop reason: HOSPADM

## 2018-03-29 RX ORDER — METOPROLOL SUCCINATE 25 MG/1
25 TABLET, EXTENDED RELEASE ORAL DAILY
Status: DISCONTINUED | OUTPATIENT
Start: 2018-03-30 | End: 2018-04-03 | Stop reason: HOSPADM

## 2018-03-29 RX ORDER — ASPIRIN 81 MG/1
81 TABLET ORAL DAILY
Status: DISCONTINUED | OUTPATIENT
Start: 2018-03-30 | End: 2018-03-29

## 2018-03-29 RX ORDER — FUROSEMIDE 40 MG/1
40 TABLET ORAL DAILY
Status: DISCONTINUED | OUTPATIENT
Start: 2018-03-30 | End: 2018-04-03 | Stop reason: HOSPADM

## 2018-03-29 RX ORDER — LEUCOVORIN CALCIUM 5 MG/1
5 TABLET ORAL
COMMUNITY
End: 2018-04-12

## 2018-03-29 RX ORDER — BISMUTH SUBSALICYLATE 262 MG
1 TABLET,CHEWABLE ORAL DAILY
COMMUNITY

## 2018-03-29 RX ORDER — ARFORMOTEROL TARTRATE 15 UG/2ML
15 SOLUTION RESPIRATORY (INHALATION)
Status: DISCONTINUED | OUTPATIENT
Start: 2018-03-29 | End: 2018-04-03 | Stop reason: HOSPADM

## 2018-03-29 RX ORDER — SODIUM CHLORIDE 0.9 % (FLUSH) 0.9 %
5-10 SYRINGE (ML) INJECTION EVERY 8 HOURS
Status: DISCONTINUED | OUTPATIENT
Start: 2018-03-29 | End: 2018-04-03 | Stop reason: HOSPADM

## 2018-03-29 RX ORDER — BUDESONIDE 0.5 MG/2ML
500 INHALANT ORAL
Status: DISCONTINUED | OUTPATIENT
Start: 2018-03-29 | End: 2018-04-03 | Stop reason: HOSPADM

## 2018-03-29 RX ORDER — FUROSEMIDE 40 MG/1
40 TABLET ORAL DAILY
COMMUNITY
End: 2018-05-07 | Stop reason: SDUPTHER

## 2018-03-29 RX ORDER — DIPHENHYDRAMINE HCL 25 MG
25 CAPSULE ORAL
Status: DISCONTINUED | OUTPATIENT
Start: 2018-03-29 | End: 2018-04-03 | Stop reason: HOSPADM

## 2018-03-29 RX ADMIN — Medication 10 ML: at 21:13

## 2018-03-29 RX ADMIN — ENOXAPARIN SODIUM 40 MG: 40 INJECTION SUBCUTANEOUS at 21:12

## 2018-03-29 NOTE — ED TRIAGE NOTES
Pt arrives by EMS with c/o of altered mental status per family pt has been altered for the last week with weakness, family is at home oxygen at 2L per EMS pt oxygen was 90 and dropped and was but on cpap pt was on non rebreather when arrived to ER. Respiratory called to placed pt on bipap.

## 2018-03-29 NOTE — IP AVS SNAPSHOT
[address] [place] [address] 104 1007 [facility] 
[phone] Patient: Daniella Corona MRN: [id number] OLO:37/83/0932 About your hospitalization You were admitted on:  March 29, 2018 You last received care in the:  [facility] 3 PROG CARE TELE 1 You were discharged on:  April 3, 2018 Why you were hospitalized Your primary diagnosis was:  Acute On Chronic Respiratory Failure With Hypoxia And Hypercapnia (Hcc) Your diagnoses also included:  Rheumatoid Arthritis Involving Multiple Sites With Positive Rheumatoid Factor (Hcc), Essential Hypertension, Acute Encephalopathy, Class 3 Obesity With Serious Comorbidity And Body Mass Index (Bmi) Of 45.0 To 49.9 In Adult (Hcc), Pulmonary Hypertension (Hcc), Dyslipidemia, Goal Ldl Below 70, Coronary Artery Disease Involving Native Heart Follow-up Information Follow up With Details Comments Contact Info Cathleen Pugh MD In 1 week  [address] [place] [address] Dr Andrews 2500 [facility] 57 
[phone] Patsi Hammans, DO  appointment on 4/20/18 as scheduled 3003 [facility] 200 1400 [address] [place] [address] 
[phone] Discharge Orders None A check fco indicates which time of day the medication should be taken. My Medications START taking these medications Instructions Each Dose to Equal  
 Morning Noon Evening Bedtime  
 atorvastatin 20 mg tablet Commonly known as:  LIPITOR Your last dose was:  Start tonight 4/3 Notes to Patient:  New medication to treat high cholesterol Take 1 Tab by mouth nightly. For high cholesterol 20 mg CONTINUE taking these medications Instructions Each Dose to Equal  
 Morning Noon Evening Bedtime ADVAIR DISKUS 250-50 mcg/dose diskus inhaler Generic drug:  fluticasone-salmeterol Your last dose was: You were given Brovana at 7 am 4/3. Resume  Advair  at discharge. Take 1 Puff by inhalation every twelve (12) hours. 1 Puff  
    
  
   
   
   
  
  
 furosemide 40 mg tablet Commonly known as:  LASIX Your last dose was:  Given at 9 am 4/3 Take 40 mg by mouth daily. 40 mg  
    
  
   
   
   
  
 leucovorin calcium 5 mg tablet Commonly known as:  Leldon Sep Your last dose was:  Not given while in hospital - resume at discharge Take 5 mg by mouth every seven (7) days. To be taken 24 hours after methotrexate. 5 mg  
    
   
   
   
  
 metoprolol succinate 25 mg XL tablet Commonly known as:  TOPROL-XL Your last dose was:  Given at 9 am 4/3 Take 1 Tab by mouth daily. 25 mg  
    
  
   
   
   
  
 multivitamin tablet Commonly known as:  ONE A DAY Your last dose was:  Not given while in hospital - resume at discharge Take 1 Tab by mouth daily. 1 Tab Ocuvite PreserVision [phone] unit-mg-unit Tab tablet Generic drug:  vitamins  A,C,E-zinc-copper Your last dose was:  Not given while in hospital   Resume at discharge Take 1 Tab by mouth two (2) times a day. 1 Tab ORENCIA 125 mg/mL Syrg Generic drug:  abatacept Your last dose was:  Not given while in hospital - resume at discharge Your next dose is:  Due today around 12 noon ( home medication) 125 mg by SubCUTAneous route every Sunday. On Saturday 125 mg  
    
  
   
   
   
  
 predniSONE 5 mg tablet Commonly known as:  Barbara Santana Your last dose was:  Given at 9 am 4/3 TK 1 TO 2 TS PO QD PRN  
     
  
   
   
   
  
 TYLENOL 325 mg tablet Generic drug:  acetaminophen Take 650 mg by mouth every four (4) hours as needed for Pain. 650 mg  
    
   
   
   
  
 VITAMIN D2 50,000 unit capsule Generic drug:  ergocalciferol Your last dose was:  Not given while in hospital - resume at discharge Take 50,000 Units by mouth Every Friday. On Friday   Indications: Vitamin D Deficiency 51936 Units Where to Get Your Medications Information on where to get these meds will be given to you by the nurse or doctor. ! Ask your nurse or doctor about these medications  
  atorvastatin 20 mg tablet Discharge Instructions HOSPITALIST DISCHARGE INSTRUCTIONS 
NAME: Aleksandar Stevenson :  1955 MRN:  [id number] Date/Time:  4/3/2018 11:09 AM 
 
ADMIT DATE: 3/29/2018 DISCHARGE DATE: 4/3/2018 PRINCIPAL DISCHARGE DIAGNOSES: 
Respiratory failure MEDICATIONS: 
· It is important that you take the medication exactly as they are prescribed. Note the changes and additions to your medications. Be sure you understand these changes before you are discharged today. · Keep your medication in the bottles provided by the pharmacist and keep a list of the medication names, dosages, and times to be taken in your wallet. · Do not take other medications without consulting your doctor. Pain Management: per above medications What to do at [facility] Recommended diet:  Resume previous diet Recommended activity: pulmonary rehab outpatient. The pulmonology team will arrange this for you. If you experience any of the following symptoms then please call your primary care physician or return to the emergency room if you cannot get hold of your doctor: 
Fever, chills, severe abdominal pain, nausea, vomiting, diarrhea, confusion, severe chest pain, shortness of breath, or other severe concerning symptoms that brought you to the hospital in the first place Follow Up: Follow-up Information Follow up With Details Comments Contact Info Vik Forde MD In 1 week  [address] Andrews 2500 [place] [address] 
[phone] Eleanor Laurent DO  appointment on 18 as scheduled 3003 [facility] [address] [place] [address] [phone] Information obtained by : 
I understand that if any problems occur once I am at home I am to contact my physician. I understand and acknowledge receipt of the instructions indicated above. Physician's or R.N.'s Signature                                                                  Date/Time Patient or Representative Signature                                                          Date/Time [employer] Announcement We are excited to announce that we are making your provider's discharge notes available to you in Voya[employer]ge. You will see these notes when they are completed and signed by the physician that discharged you from your recent hospital stay. If you have any questions or concerns about any information you see in Voya[employer]ge, please call the Health Information Department where you were seen or reach out to your Primary Care Provider for more information about your plan of care. Introducing [facility] SERVICES! Dear Divina Lyman: Thank you for requesting a V[employer]ge account. Our records indicate that you already have an active [employer] account. You can access your account anytime at [email or web address] Did you know that you can access your hospital and ER discharge instructions at any time in Voya.ge? You can also review all of your test results from your hospital stay or ER visit. Additional Information If you have questions, please visit the Frequently Asked Questions section of the Voya.ge website at [email or web address] Remember, Voya.ge is NOT to be used for urgent needs. For medical emergencies, dial 911. Now available from your iPhone and Android! Introducing Wolf Esquivel As a [facility] patient, I wanted to make you aware of our electronic visit tool called Wolf Esquivel. [employer] allows you to connect within minutes with a medical provider 24 hours a day, seven days a week via a mobile device or tablet or logging into a secure website from your computer. You can access Wolf Esquivel from anywhere in the [place]. A virtual visit might be right for you when you have a simple condition and feel like you just dont want to get out of bed, or cant get away from work for an appointment, when your regular [facility] provider is not available (evenings, weekends or holidays), or when youre out of town and need minor care. Electronic visits cost only $49 and if the [employer] provider determines a prescription is needed to treat your condition, one can be electronically transmitted to a nearby pharmacy*. Please take a moment to enroll today if you have not already done so. The enrollment process is free and takes just a few minutes. To enroll, please download the [employer] adriana to your tablet or phone, or visit [email or web address] to enroll on your computer. And, as an [address] [place] [address] patient with a [employer] account, the results of your visits will be scanned into your electronic medical record and your primary care provider will be able to view the scanned results. We urge you to continue to see your regular [facility] provider for your ongoing medical care. And while your primary care provider may not be the one available when you seek a Wolf Esquivel virtual visit, the peace of mind you get from getting a real diagnosis real time can be priceless. For more information on Wolf Esquivel, view our Frequently Asked Questions (FAQs) at [email or web address]. Sincerely, 
 
Tiffanie Maradiaga MD 
Chief Medical Officer Dwayne Aburto *:  certain medications cannot be prescribed via Wolf Moser [facility] Unresulted Labs-Please follow up with your PCP about these lab tests Order Current Status OVA & PARASITES, STOOL In process CULTURE, STOOL Preliminary result Last Palliative Care Discharge Note 04/03/18 3420  Version 1 of 1 Goals of Care/Treatment Preferences The Palliative Medicine team was consulted as part of your/your loved one's care in the hospital. Our team is a supportive service; we strive to relieve suffering and improve quality of life. You met with Dr. Paco Dominique and  [employer]. We reviewed advance care planning information, which includes the following: 
Patient's [address] is[de-identified] Verbal statement (Legal Next of Kin remains as decision maker) Primary Decision Maker Name: Kelly Plascencia Primary Decision Maker Phone Number: [phone] Primary Decision Maker Relationship to Patient: Spouse Confirm Advance Directive: Yes, not on file Patient/Health Care Proxy Stated Goals: Other (comment) (full restorative measures) We reviewed / discussed your code status as: Full Code Full Code means perform CPR in the event of cardiac arrest. 
    DNR means do NOT perform CPR in the event of cardiac arrest. 
    Partial Code means you have specific preferences, please discuss with your healthcare team. 
    Andre Singh means this issue was not addressed / resolved during your stay Medical Interventions: Limited additional interventions Other Instructions: You indicated that you have an Advance Medical Directive in place which appoints your  Kelly Plascencia as your medical Power of [address] [place] [address]. If available, please provide a copy to staff to be scanned into your medical record.  
  
Because of the importance of this information, we are providing you with a printed copy to share with other healthcare providers after this hospitalization is complete. Providers Seen During Your Hospitalization Provider Specialty Primary office phone Dominick Loya MD Emergency Medicine [phone] Gricelda Haas MD Internal Medicine [phone] Rose Aguilar MD Internal Medicine [phone] Safia Pelaez MD Internal Medicine [phone] Your Primary Care Physician (PCP) Primary Care Physician Office Phone Office Fax Judi Miles [phone] You are allergic to the following Allergen Reactions Bee Sting (Sting, Bee) Anaphylaxis Codeine Hives Shortness of Breath  
 bp drop Methotrexate Nausea and Vomiting Penicillins Hives Shortness of Breath  
 bp drop Recent Documentation Height Weight Breastfeeding? BMI OB Status Smoking Status 1.651 m 126.5 kg No 46.41 kg/m2 Postmenopausal Former Smoker Emergency Contacts Name Discharge Info Relation Home Work Mobile Perkins,Claude DISCHARGE CAREGIVER [3] Spouse [3] [phone] Patient Belongings The following personal items are in your possession at time of discharge: 
     Visual Aid: Glasses      Home Medications: None      Clothing: Pants, Footwear, At bedside Please provide this summary of care documentation to your next provider. Signatures-by signing, you are acknowledging that this After Visit Summary has been reviewed with you and you have received a copy. Patient Signature:  ____________________________________________________________ Date:  ____________________________________________________________  
  
Hilda Giang Provider Signature:  ____________________________________________________________ Date:  ____________________________________________________________

## 2018-03-29 NOTE — ED PROVIDER NOTES
HPI Comments: 58 y.o. female with past medical history significant for RA and HTN who presents from home via EMS with chief complaint of AMS. Per EMS, they were called in by the pt's family this evening with complaints of AMS as well as SOB. EMS report, that they were informed by the pt's  that she has appeared with AMS for the past week, along with generalized weakness. While in the ED, the pt states that she has noticed the AMS over the past few days. Per pt, her SOB appeared rather severe this evening although using 2L nasal canula at home. Upon EMS arrival, they note that the pt had saturations in the 90's which later dropped into the 70's en route to the ED. The pt was started CPAP at that time and presents to the ED on non-rebreather. Per EMS, the pt had a systolic BP in the 078'D and received an inch of nitro past en route. The pt notes that she did experience some CP earlier today, however no pain is present at this time. Pt states that she is unsure why she is on the 2L nasal canula at home. She adds that she has no known hx of COPD, emphysema or CHF. The pt is able to correctly state the current place, state, year and U.[place] president. Pt denies pain at this time, yet states she is feeling \"Jerky\". When asked if the pt has been present with a fever or chills, she states that she has been experiencing both since last April. She denies N/V/D, blood or black stool, dysuria and other urinary symptoms. There are no other acute medical concerns at this time. Full history, physical exam, and ROS unable to be obtained due to: Mental Status Change. Social hx: Former smoker, Occasional ETOH consumption    PCP: Julius Frazier MD    Note written by Sandra St, as dictated by Christofer Atkins MD 8:04 PM          The history is provided by the patient and the EMS personnel. No  was used.         Past Medical History:   Diagnosis Date    Hypertension     [facility]        Past Surgical History:   Procedure Laterality Date    HX GYN      R vulvectomy         Family History:   Problem Relation Age of Onset    Heart Disease Father     Hypertension Father     Stroke Father     Heart Disease Mother     Hypertension Mother     Hypertension Sister     Hypertension Brother        Social History     Social History    Marital status:      Spouse name: N/A    Number of children: N/A    Years of education: N/A     Occupational History    Not on file. Social History Main Topics    Smoking status: Former Smoker    Smokeless tobacco: Never Used    Alcohol use Yes      Comment: occasional    Drug use: No    Sexual activity: No     Other Topics Concern    Not on file     Social History Narrative         ALLERGIES: Bee sting [sting, bee]; Codeine; Methotrexate; and Penicillins    Review of Systems   Unable to perform ROS: Mental status change       There were no vitals filed for this visit. Physical Exam   Vital signs reviewed. Nursing notes reviewed. Const:  No acute distress, well developed, well nourished  Head:  Atraumatic, normocephalic  Eyes:  PERRL, conjunctiva normal, no scleral icterus  Neck:  Supple, trachea midline  Cardiovascular:  RRR, no murmurs, no gallops, no rubs  Resp:  No resp distress, no increased work of breathing, no wheezes, no rhonchi, no rales. Good air movement in all lung fields.    Abd:  Soft, non-tender, non-distended, no rebound, no guarding, no CVA tenderness  :  Deferred  MSK:  No pedal edema, normal ROM  Neuro:  Alert and oriented x3, no cranial nerve defect  Skin:  Warm, dry, intact  Psych: normal mood and affect, appears confused with repetitive answers, judgement appears normal.    Note written by Sandra Nava, as dictated by Katy Nguyen MD 7:43 PM      MDM  Number of Diagnoses or Management Options  Acute respiratory failure with hypoxia and hypercapnia ([facility]):   CO2 narcosis: Amount and/or Complexity of Data Reviewed  Clinical lab tests: ordered and reviewed  Tests in the radiology section of CPT®: ordered and reviewed  Review and summarize past medical records: yes    Patient Progress  Patient progress: stable      ED Course     Pt. Presents to the ER with AMS and SOB. Pt. Found to be profoundly hypoxic and hypercapnic. I have started her on BiPAP. Pt. Is awake and oriented x4 but seems somewhat drowsy at presentation. Pt. Seen and admitted by the hospitalist.    Procedures    CONSULT NOTE:  8:16 PM Yvonne Ricks MD spoke with Dr. Lamonte Bumpers, Consult for Hospitalist.  Discussed available diagnostic tests and clinical findings. Dr. Lamonte Bumpers states he will see and admit the pt. EKG interpretation: (Preliminary)  Rhythm: normal sinus rhythm; and regular . Rate (approx.): 84; Axis: normal; P wave: normal; QRS interval: normal ; ST/T wave: non-specific changes; Other findings: borderline ekg.

## 2018-03-29 NOTE — IP AVS SNAPSHOT
[address] 
[phone] Patient: Sapna Del Real MRN: [id number] [facility]:[birth year] A check fco indicates which time of day the medication should be taken. My Medications START taking these medications Instructions Each Dose to Equal  
 Morning Noon Evening Bedtime  
 atorvastatin 20 mg tablet Commonly known as:  LIPITOR Your last dose was:  Start tonight 4/3 Notes to Patient:  New medication to treat high cholesterol Take 1 Tab by mouth nightly. For high cholesterol 20 mg CONTINUE taking these medications Instructions Each Dose to Equal  
 Morning Noon Evening Bedtime ADVAIR DISKUS 250-50 mcg/dose diskus inhaler Generic drug:  fluticasone-salmeterol Your last dose was: You were given Brovana at 7 am 4/3. Resume  Advair  at discharge. Take 1 Puff by inhalation every twelve (12) hours. 1 Puff  
    
  
   
   
   
  
  
 furosemide 40 mg tablet Commonly known as:  LASIX Your last dose was:  Given at 9 am 4/3 Take 40 mg by mouth daily. 40 mg  
    
  
   
   
   
  
 leucovorin calcium 5 mg tablet Commonly known as:  Belita Cancer Your last dose was:  Not given while in hospital - resume at discharge Take 5 mg by mouth every seven (7) days. To be taken 24 hours after methotrexate. 5 mg  
    
   
   
   
  
 metoprolol succinate 25 mg XL tablet Commonly known as:  TOPROL-XL Your last dose was:  Given at 9 am 4/3 Take 1 Tab by mouth daily. 25 mg  
    
  
   
   
   
  
 multivitamin tablet Commonly known as:  ONE A DAY Your last dose was:  Not given while in hospital - resume at discharge Take 1 Tab by mouth daily. 1 Tab Ocuvite PreserVision [phone] unit-mg-unit Tab tablet Generic drug:  vitamins  A,C,E-zinc-copper Your last dose was:  Not given while in hospital   Resume at discharge Take 1 Tab by mouth two (2) times a day. 1 Tab ORENCIA 125 mg/mL Syrg Generic drug:  abatacept Your last dose was:  Not given while in hospital - resume at discharge Your next dose is:  Due today around 12 noon ( home medication) 125 mg by SubCUTAneous route every Sunday. On Saturday 125 mg  
    
  
   
   
   
  
 predniSONE 5 mg tablet Commonly known as:  Ev Brown Your last dose was:  Given at 9 am 4/3 TK 1 TO 2 TS PO QD PRN  
     
  
   
   
   
  
 TYLENOL 325 mg tablet Generic drug:  acetaminophen Take 650 mg by mouth every four (4) hours as needed for Pain. 650 mg  
    
   
   
   
  
 VITAMIN D2 50,000 unit capsule Generic drug:  ergocalciferol Your last dose was:  Not given while in hospital - resume at discharge Take 50,000 Units by mouth Every Friday. On Friday   Indications: Vitamin D Deficiency 32278 Units Where to Get Your Medications Information on where to get these meds will be given to you by the nurse or doctor. ! Ask your nurse or doctor about these medications  
  atorvastatin 20 mg tablet

## 2018-03-30 PROBLEM — I27.20 PULMONARY HYPERTENSION (HCC): Chronic | Status: ACTIVE | Noted: 2017-08-05

## 2018-03-30 PROBLEM — I25.10 CORONARY ARTERY DISEASE INVOLVING NATIVE HEART: Chronic | Status: ACTIVE | Noted: 2017-08-05

## 2018-03-30 PROBLEM — E78.5 DYSLIPIDEMIA, GOAL LDL BELOW 70: Chronic | Status: ACTIVE | Noted: 2017-08-05

## 2018-03-30 LAB
ANION GAP SERPL CALC-SCNC: ABNORMAL MMOL/L (ref 5–15)
ARTERIAL PATENCY WRIST A: YES
BASE EXCESS BLDA CALC-SCNC: 22.5 MMOL/L
BDY SITE: ABNORMAL
BUN SERPL-MCNC: 10 MG/DL (ref 6–20)
BUN/CREAT SERPL: 19 (ref 12–20)
CALCIUM SERPL-MCNC: 9.1 MG/DL (ref 8.5–10.1)
CHLORIDE SERPL-SCNC: 99 MMOL/L (ref 97–108)
CO2 SERPL-SCNC: >45 MMOL/L (ref 21–32)
CREAT SERPL-MCNC: 0.52 MG/DL (ref 0.55–1.02)
EPAP/CPAP/PEEP, PAPEEP: 8
ERYTHROCYTE [DISTWIDTH] IN BLOOD BY AUTOMATED COUNT: 14.2 % (ref 11.5–14.5)
FIO2 ON VENT: 55 %
GLUCOSE SERPL-MCNC: 148 MG/DL (ref 65–100)
HCO3 BLDA-SCNC: 54 MMOL/L (ref 22–26)
HCT VFR BLD AUTO: 40.9 % (ref 35–47)
HGB BLD-MCNC: 11.1 G/DL (ref 11.5–16)
IPAP/PIP, IPAPIP: 18
MAGNESIUM SERPL-MCNC: 2.1 MG/DL (ref 1.6–2.4)
MCH RBC QN AUTO: 27.6 PG (ref 26–34)
MCHC RBC AUTO-ENTMCNC: 27.1 G/DL (ref 30–36.5)
MCV RBC AUTO: 101.7 FL (ref 80–99)
NRBC # BLD: 0 K/UL (ref 0–0.01)
NRBC BLD-RTO: 0 PER 100 WBC
PCO2 BLDA: 96 MMHG (ref 35–45)
PH BLDA: 7.37 [PH] (ref 7.35–7.45)
PLATELET # BLD AUTO: 184 K/UL (ref 150–400)
PMV BLD AUTO: 9.2 FL (ref 8.9–12.9)
PO2 BLDA: 71 MMHG (ref 80–100)
POTASSIUM SERPL-SCNC: 4.9 MMOL/L (ref 3.5–5.1)
RBC # BLD AUTO: 4.02 M/UL (ref 3.8–5.2)
SAO2 % BLD: 92 % (ref 92–97)
SAO2% DEVICE SAO2% SENSOR NAME: ABNORMAL
SERVICE CMNT-IMP: ABNORMAL
SODIUM SERPL-SCNC: 146 MMOL/L (ref 136–145)
SPECIMEN SITE: ABNORMAL
WBC # BLD AUTO: 5.8 K/UL (ref 3.6–11)

## 2018-03-30 PROCEDURE — 80048 BASIC METABOLIC PNL TOTAL CA: CPT | Performed by: INTERNAL MEDICINE

## 2018-03-30 PROCEDURE — 51798 US URINE CAPACITY MEASURE: CPT

## 2018-03-30 PROCEDURE — 94640 AIRWAY INHALATION TREATMENT: CPT

## 2018-03-30 PROCEDURE — 77030011943

## 2018-03-30 PROCEDURE — 74011000250 HC RX REV CODE- 250: Performed by: INTERNAL MEDICINE

## 2018-03-30 PROCEDURE — 82803 BLOOD GASES ANY COMBINATION: CPT | Performed by: INTERNAL MEDICINE

## 2018-03-30 PROCEDURE — 85027 COMPLETE CBC AUTOMATED: CPT | Performed by: INTERNAL MEDICINE

## 2018-03-30 PROCEDURE — 36415 COLL VENOUS BLD VENIPUNCTURE: CPT | Performed by: INTERNAL MEDICINE

## 2018-03-30 PROCEDURE — 65660000000 HC RM CCU STEPDOWN

## 2018-03-30 PROCEDURE — 74011250636 HC RX REV CODE- 250/636: Performed by: INTERNAL MEDICINE

## 2018-03-30 PROCEDURE — 94660 CPAP INITIATION&MGMT: CPT

## 2018-03-30 PROCEDURE — 76450000000

## 2018-03-30 PROCEDURE — 83735 ASSAY OF MAGNESIUM: CPT | Performed by: INTERNAL MEDICINE

## 2018-03-30 PROCEDURE — 74011636637 HC RX REV CODE- 636/637: Performed by: INTERNAL MEDICINE

## 2018-03-30 PROCEDURE — 77030034848

## 2018-03-30 PROCEDURE — 74011250637 HC RX REV CODE- 250/637: Performed by: INTERNAL MEDICINE

## 2018-03-30 PROCEDURE — 36600 WITHDRAWAL OF ARTERIAL BLOOD: CPT | Performed by: INTERNAL MEDICINE

## 2018-03-30 RX ORDER — HYDRALAZINE HYDROCHLORIDE 20 MG/ML
20 INJECTION INTRAMUSCULAR; INTRAVENOUS ONCE
Status: COMPLETED | OUTPATIENT
Start: 2018-03-30 | End: 2018-03-30

## 2018-03-30 RX ORDER — PREDNISONE 5 MG/1
5 TABLET ORAL
Status: DISCONTINUED | OUTPATIENT
Start: 2018-03-30 | End: 2018-04-03 | Stop reason: HOSPADM

## 2018-03-30 RX ORDER — HYDRALAZINE HYDROCHLORIDE 20 MG/ML
20 INJECTION INTRAMUSCULAR; INTRAVENOUS
Status: DISCONTINUED | OUTPATIENT
Start: 2018-03-30 | End: 2018-04-03 | Stop reason: HOSPADM

## 2018-03-30 RX ORDER — HYDRALAZINE HYDROCHLORIDE 20 MG/ML
20 INJECTION INTRAMUSCULAR; INTRAVENOUS EVERY 6 HOURS
Status: DISCONTINUED | OUTPATIENT
Start: 2018-03-30 | End: 2018-03-30

## 2018-03-30 RX ADMIN — ENOXAPARIN SODIUM 40 MG: 40 INJECTION SUBCUTANEOUS at 08:40

## 2018-03-30 RX ADMIN — ACETAMINOPHEN 650 MG: 325 TABLET ORAL at 17:02

## 2018-03-30 RX ADMIN — ARFORMOTEROL TARTRATE 15 MCG: 15 SOLUTION RESPIRATORY (INHALATION) at 20:53

## 2018-03-30 RX ADMIN — BUDESONIDE 500 MCG: 0.5 INHALANT RESPIRATORY (INHALATION) at 08:20

## 2018-03-30 RX ADMIN — METOPROLOL SUCCINATE 25 MG: 25 TABLET, EXTENDED RELEASE ORAL at 08:40

## 2018-03-30 RX ADMIN — FUROSEMIDE 40 MG: 40 TABLET ORAL at 08:40

## 2018-03-30 RX ADMIN — Medication 10 ML: at 04:06

## 2018-03-30 RX ADMIN — HYDRALAZINE HYDROCHLORIDE 20 MG: 20 INJECTION INTRAMUSCULAR; INTRAVENOUS at 16:27

## 2018-03-30 RX ADMIN — ARFORMOTEROL TARTRATE 15 MCG: 15 SOLUTION RESPIRATORY (INHALATION) at 00:24

## 2018-03-30 RX ADMIN — ARFORMOTEROL TARTRATE 15 MCG: 15 SOLUTION RESPIRATORY (INHALATION) at 08:20

## 2018-03-30 RX ADMIN — Medication 10 ML: at 21:35

## 2018-03-30 RX ADMIN — Medication 10 ML: at 14:00

## 2018-03-30 RX ADMIN — BUDESONIDE 500 MCG: 0.5 INHALANT RESPIRATORY (INHALATION) at 00:24

## 2018-03-30 RX ADMIN — BUDESONIDE 500 MCG: 0.5 INHALANT RESPIRATORY (INHALATION) at 20:53

## 2018-03-30 RX ADMIN — ENOXAPARIN SODIUM 40 MG: 40 INJECTION SUBCUTANEOUS at 21:31

## 2018-03-30 RX ADMIN — PREDNISONE 5 MG: 5 TABLET ORAL at 08:40

## 2018-03-30 NOTE — PROGRESS NOTES
[facility] Dosing Services: 3/29/18    Pharmacy note for Dr Infante Listen regarding Lovenox dose adjustment for increased BMI:    Wt Readings from Last 1 Encounters:   03/29/18 128.4 kg (283 lb)       Ht Readings from Last 1 Encounters:   03/29/18 165.1 cm (65\")           Previous Dose 40mg sq q 24h   Creatinine Clearance Estimated Creatinine Clearance: 183.3 mL/min (based on Cr of 0.43). Creatinine Lab Results   Component Value Date/Time    Creatinine 0.43 (L) 03/29/2018 07:59 PM       Platelet Lab Results   Component Value Date/Time    PLATELET 215 68/02/7257 07:59 PM      H/H Lab Results   Component Value Date/Time    HGB 11.7 03/29/2018 07:59 PM          [ x ] BMI: dose changed to: 40mg sq q12h    - Pharmacy to automatically make dose adjustment for obesity (BMI greater than 40)  - Pharmacy to make dose rounding adjustments per [facility] dose adjustment scale.       Leann Kang, PHARMD . Contact information: [phone]

## 2018-03-30 NOTE — PROGRESS NOTES
Spiritual Care Assessment/Progress Note  [address]      NAME: Maureen Newell      MRN: [id number]  AGE: 58 y.o. SEX: female  Mormon Affiliation: Episcopal   Language: English     3/30/2018     Total Time (in minutes): 22     Spiritual Assessment begun in [facility] 3 INTERVNTNL CARE through conversation with:         [x]Patient        [] Family    [] Friend(s)        Reason for Consult: Initial/Spiritual assessment, critical care     Spiritual beliefs: (Please include comment if needed)     [x] Involved in a reji tradition/spiritual practice:A [employer] but I could not ascertain name at this time.      [] Supported by a reji community:      [] Claims no spiritual orientation:      [] Seeking spiritual identity:           [] Adheres to an individual form of spirituality:      [] Not able to assess:                     Identified resources for coping:      [] Prayer                  [] Devotional reading               [] Music                  [] Guided Imagery     [] Family/friends                 [] Pet visits     [] Other:        Interventions offered during this visit: (See comments for more details)    Patient Interventions: Affirmation of emotions/emotional suffering, Affirmation of reji, Initial/Spiritual assessment, Critical care           Plan of Care:     [] Discuss Spiritual/Cultural needs    [] Support AMD and/or advance care planning process      [] Support grieving process   [] Coordinate Rites/Rituals    [] Coordination with community clergy   [] No spiritual needs identified at this time   [x] Detailed Plan of Care below (See Comments)  [] Make referral to Music Therapy  [] Make referral to Pet Therapy     [] Make referral to Addiction services  [] Make referral to [facility]  [] Make referral to Spiritual Care Partner  [] No future visits requested             Comments: Initial visit with patient who was very confused and struggling with her Bipap.  Ms. Caridad Waggoner was attempting to tell me about her [employer] and initially thought I was from her [employer] though she was unable to name the [employer]. Pt struggling with her oxygen; nurses aware and attempting to help patient at this time. No family present. Pastoral care is available to follow up at a more appropriate time. Pt appeared to want/desire from her [employer] home but was unable to name [employer] for me to contact them at this time. Please page as needed/desired. [phone]. Visit by: Juan Gaona. Asaf Alvares.  Amy Romero MA, [facility]    Lead  Profession Development & Advancement

## 2018-03-30 NOTE — PROGRESS NOTES
[facility]  [address] [facility], [address] [place] [address]  [phone]      Medical Progress Note      NAME: Tara Hutson   :  1955  MRM:  [id number]    Date/Time: 3/30/2018  7:51 AM         Subjective:     Chief Complaint:  Confusion: more alert this AM but not really making any sense, wearing BiPAP, requesting to take it off, doesn't understand why it is necessary even after I carefully explain it to her, cannot restate rational, keeps requesting water    ROS:  (bold if positive, if negative)    Unable to obtain due to confusion       Objective:       Vitals:          Last 24hrs VS reviewed since prior progress note.  Most recent are:    Visit Vitals    /77    Pulse 81    Temp 98.2 °F (36.8 °C)    Resp 15    Ht 5' 5\" (1.651 m)    Wt 128.4 kg (283 lb)    SpO2 91%    BMI 47.09 kg/m2     SpO2 Readings from Last 6 Encounters:   18 91%   17 94%   17 (P) 94%   17 99%   17 95%   17 98%    O2 Flow Rate (L/min): 15 l/min     Intake/Output Summary (Last 24 hours) at 18 0751  Last data filed at 18 0620   Gross per 24 hour   Intake                0 ml   Output              600 ml   Net             -600 ml          Exam:     Physical Exam:    Gen:  Well-developed, morbidly obese, in no acute distress  HEENT:  Pink conjunctivae, PERRL, hearing intact to voice, moist mucous membranes  Neck:  Supple, without masses, thyroid non-tender  Resp:  No accessory muscle use, shallow but clear  Card:  No murmurs, normal S1, S2 without thrills, bruits or peripheral edema  Abd:  Soft, non-tender, non-distended, normoactive bowel sounds are present, no palpable organomegaly and no detectable hernias  Lymph:  No cervical or inguinal adenopathy  Musc:  No cyanosis or clubbing  Skin:  No rashes or ulcers, skin turgor is good  Neuro:  Cranial nerves are grossly intact, no focal motor weakness, follows commands appropriately  Psych:  Poor insight, oriented to person, place but not time, alert       Telemetry reviewed:   normal sinus rhythm    Medications Reviewed: (see below)    Lab Data Reviewed: (see below)    ______________________________________________________________________    Medications:     Current Facility-Administered Medications   Medication Dose Route Frequency    acetaminophen (TYLENOL) tablet 650 mg  650 mg Oral Q4H PRN    furosemide (LASIX) tablet 40 mg  40 mg Oral DAILY    metoprolol succinate (TOPROL-XL) XL tablet 25 mg  25 mg Oral DAILY    sodium chloride (NS) flush 5-10 mL  5-10 mL IntraVENous Q8H    sodium chloride (NS) flush 5-10 mL  5-10 mL IntraVENous PRN    diphenhydrAMINE (BENADRYL) capsule 25 mg  25 mg Oral Q4H PRN    ondansetron (ZOFRAN) injection 4 mg  4 mg IntraVENous Q4H PRN    enoxaparin (LOVENOX) injection 40 mg  40 mg SubCUTAneous Q12H    arformoterol (BROVANA) neb solution 15 mcg  15 mcg Nebulization BID RT    budesonide (PULMICORT) 500 mcg/2 ml nebulizer suspension  500 mcg Nebulization BID RT            Lab Review:     Recent Labs      03/30/18 0417 03/29/18 1959   WBC  5.8  6.8   HGB  11.1*  11.7   HCT  40.9  42.0   PLT  184  198     Recent Labs      03/30/18 0417 03/29/18 1959   NA  146*  146*   K  4.9  4.4   CL  99  98   CO2  >45*  >45*   GLU  148*  94   BUN  10  9   CREA  0.52*  0.43*   CA  9.1  9.1   MG  2.1   --    ALB   --   2.9*   TBILI   --   0.3   SGOT   --   19   ALT   --   32     No results found for: GLUCPOC  Recent Labs      03/29/18 2055 03/29/18 1950   PH  7.22*  7.18*   PCO2  140*  140*   PO2  66*  79*   HCO3  56*  52*   FIO2  55  60     No results for input(s): INR in the last 72 hours.     No lab exists for component: INREXT  No results found for: SDES  No results found for: CULT         Assessment:     Principal Problem:    Acute on chronic respiratory failure with hypoxia and hypercapnia ([facility] (8/5/2017)    Active Problems:    Rheumatoid arthritis involving multiple sites with positive rheumatoid factor ([facility] (10/7/2016)      Essential hypertension (10/7/2016)      Acute encephalopathy (3/4/2017)      Class 3 obesity with serious comorbidity and body mass index (BMI) of 45.0 to 49.9 in [facility]) (8/5/2017)      Pulmonary hypertension (8/5/2017)      Dyslipidemia, goal LDL below 70 (8/5/2017)      Coronary artery disease involving native heart (8/5/2017)           Plan:     Principal Problem:    Acute on chronic respiratory failure with hypoxia and hypercapnia (HCC) (8/5/2017)   - continue BiPAP   - Pulmonary to see   - will need home respiratory support arranged   - admission due to non-compliance    Active Problems:    Rheumatoid arthritis involving multiple sites with positive rheumatoid factor ([facility] (10/7/2016)   - resume usual oral prednisone unless Pulmonary feels higher dose steroids are indicated      Essential hypertension (10/7/2016)   - BP okay, follow on home meds      Acute encephalopathy (3/4/2017)   - POA, due to hypercapnea, clearing slowly      Class 3 obesity with serious comorbidity and body mass index (BMI) of 45.0 to 49.9 in [facility]) (8/5/2017)   - counseled on weight loss       Pulmonary hypertension (8/5/2017)   - likely due to obesity/hypoventilation      Dyslipidemia, goal LDL below 70 (8/5/2017)   - not on statin, outpatient follow up      Coronary artery disease involving native heart (8/5/2017)   - stable, continue cardiac meds      Total time spent with patient: 25 minutes                  Care Plan discussed with: Patient, Care Manager and Nursing Staff    Discussed:  Code Status, Care Plan and D/C Planning    Prophylaxis:  Lovenox    Disposition:   PT, OT, RN           ___________________________________________________    Attending Physician: Carol Winston MD

## 2018-03-30 NOTE — PROGRESS NOTES
Attempted to call ED nurse for report. Unable to get report at this time. Will wait for return call. Uli Chanel RN

## 2018-03-30 NOTE — H&P
[facility]    Hospitalist Admission Note      NAME:  Lito Paul   :   1955   MRN:  [id number]     PCP:  Gina Neal MD     Date/Time:  3/29/2018 8:29 PM          Subjective:     CHIEF COMPLAINT: confusion     HISTORY OF PRESENT ILLNESS:     Ms. Elsi Lemons is a 58 y.o.  female who presented to the Emergency Department complaining of confusion. She provides no reliable hs due to confusion. Her  is present to give hx. I have reviewed chart. She had a similar admission last year. Admitted with acute hypoxic and hypercapnic failure, attributed at discharge to hypoventilation, OHS, JATINDER. Trilogy was set up per notes. Per , it was taken away. In our ER her CO2 is 140 and pH 7.18. She was placed on BiPAP. We will admit her for management. Past Medical History:   Diagnosis Date    Hypertension     RA (rheumatoid arthritis) ([address]         Past Surgical History:   Procedure Laterality Date    HX GYN      R vulvectomy       Social History   Substance Use Topics    Smoking status: Former Smoker    Smokeless tobacco: Never Used    Alcohol use Yes      Comment: occasional        Family History   Problem Relation Age of Onset    Heart Disease Father     Hypertension Father     Stroke Father     Heart Disease Mother     Hypertension Mother     Hypertension Sister     Hypertension Brother         Allergies   Allergen Reactions    Bee Sting [Sting, Bee] Anaphylaxis    Codeine Hives and Shortness of Breath     bp drop     Methotrexate Nausea and Vomiting    Penicillins Hives and Shortness of Breath     bp drop        Prior to Admission medications    Medication Sig Start Date End Date Taking? Authorizing Provider   predniSONE (DELTASONE) 5 mg tablet TK 1 TO 2 TS PO QD PRN 10/27/17   Historical Provider   aspirin delayed-release 81 mg tablet Take  by mouth daily.     Historical Provider   acetaminophen (TYLENOL) 325 mg tablet Take  by mouth every four (4) hours as needed for Pain. Historical Provider   fluticasone-salmeterol (ADVAIR HFA) 115-21 mcg/actuation inhaler Take 2 Puffs by inhalation two (2) times a day. Historical Provider   furosemide (LASIX) 40 mg tablet Take 2 tabs once a day in the morning  Indications: Edema 4/21/17   Michele Aguilar MD   potassium chloride SA (MICRO-K) 10 mEq capsule Take 1 cap daily 3/24/17   David Sanders MD   metoprolol succinate (TOPROL-XL) 25 mg XL tablet Take 1 Tab by mouth daily. 3/24/17   David Sanders MD   ergocalciferol (VITAMIN D2) 50,000 unit capsule Take 50,000 Units by mouth every seven (7) days. On Friday   Indications: VITAMIN D DEFICIENCY    Historical Provider   vitamins  A,C,E-zinc-copper (OCUVITE PRESERVISION) [phone] unit-mg-unit tab tablet Take 1 Tab by mouth two (2) times a day. Historical Provider   EPINEPHrine (EPIPEN) 0.3 mg/0.3 mL (1:1,000) injection 0.3 mL by IntraMUSCular route once as needed. 2 pens 3/25/16   David Sanders MD   ORENCIA 125 mg/mL syrg 125 mg by SubCUTAneous route every seven (7) days. On Saturday 1/29/16   Historical Provider   cholecalciferol (VITAMIN D3) 1,000 unit tablet Take 2,000 Units by mouth daily.  Except on Friday    Historical Provider       Review of Systems:  (bold if positive, if negative)    Gen:  Eyes:  ENT:  CVS:  Pulm:  dyspneaGI:    :    MS:  Skin:  Psych:  Endo:    Hem:  Renal:    Neuro:  weakness      Objective:      VITALS:    Vital signs reviewed; most recent are:    Visit Vitals    BP (!) 205/98 (BP 1 Location: Left arm, BP Patient Position: At rest)    Pulse 83    Resp 28    Ht 5' 5\" (1.651 m)    Wt 128.4 kg (283 lb)    SpO2 97%    BMI 47.09 kg/m2     SpO2 Readings from Last 6 Encounters:   03/29/18 97%   12/08/17 94%   07/14/17 (P) 94%   07/07/17 99%   06/14/17 95%   05/26/17 98%    O2 Flow Rate (L/min): 15 l/min   No intake or output data in the 24 hours ending 03/29/18 2029     Exam:     Physical Exam:    Gen:  Morbid obese, in mild resp distress  HEENT:  Pink conjunctivae, PERRL, hearing intact to voice, BiPAP mask on face fitting well  Neck:  Supple, without masses, thyroid non-tender  Resp:  Mild accessory muscle use, distant breath sounds without wheezes rales or rhonchi  Card:  No murmurs, distant S1, S2 without thrills, bruits, 2+ peripheral edema  Abd:  Soft, non-tender, obese, reduced bowel sounds are present, no mass  Lymph:  No cervical or inguinal adenopathy  Musc:  No cyanosis or clubbing  Skin:  No rashes or ulcers, skin turgor is good  Neuro:  Cranial nerves are grossly intact, general motor weakness, does not follow commands well  Psych:  Poor insight, oriented to person, place     Labs:    Recent Labs      03/29/18 1959   WBC  6.8   HGB  11.7   HCT  42.0   PLT  198     No results for input(s): NA, K, CL, CO2, GLU, BUN, CREA, CA, MG, PHOS, ALB, TBIL, TBILI, SGOT, ALT in the last 72 hours. No results found for: [facility]      03/29/18 1950   PH  7.18*   PCO2  140*   PO2  79*   HCO3  52*   FIO2  60     No results for input(s): INR in the last 72 hours. No lab exists for component: INREXT  All Micro Results     Procedure Component Value Units Date/Time    CULTURE, BLOOD, PAIRED [[id number]]     Order Status:  Sent Specimen:  Blood           I have reviewed previous records       Assessment and Plan:      Acute on chronic respiratory failure with hypoxia and hypercapnia / SOB (shortness of breath) - POA to atx and OHS. A recurrent problem. Family thinks her insurance took her Trilogy away. Family is perseverating about recent anti trypsin test.  Pulm consulted. BiPAP for now. Wean O2 as able to keep sats > 90%. Will need trilogy at discharge for chronic hypercapnia; CM consulted last year to assist and order sent to [address] [place] [address]    Acute encephalopathy - POA, due to hypercapnia. Recurrent. Monitor. Hold Ultram and narcotics.     Class 3 obesity with serious comorbidity and body mass index (BMI) of 45.0 to 49.9 in adult - POA, advise weight loss. Coronary artery disease involving native heart / Essential hypertension / Pulmonary hypertension - No symptoms of CAD. Continue ASA, metoprolol and lasix. Rheumatoid arthritis involving multiple sites with positive rheumatoid factor - Last year no evidence to suggest ILD on chest CT. Usually on prednisone and orencia. Start that in AM unless pulm thinks to hold it.     Telemetry reviewed:   normal sinus rhythm    Risk of deterioration: high      Total time spent with patient: [address] [place] [address] discussed with: Patient, Family, Nursing Staff, Consultant/Specialist and >50% of time spent in counseling and coordination of care    Discussed:  Care Plan       ___________________________________________________    Attending Physician: Christina Maxwell MD

## 2018-03-30 NOTE — PROGRESS NOTES
TRANSFER - IN REPORT:    Verbal report received from [facility] RN(name) on Velasquez Round  being received from ED(unit) for routine progression of care      Report consisted of patients Situation, Background, Assessment and   Recommendations(SBAR). Information from the following report(s) SBAR, Kardex, [facility] and Recent Results and cardiac rhythm-NSR was reviewed with the receiving nurse. Opportunity for questions and clarification was provided. Assessment completed upon patients arrival to unit and care assumed. 00:40  Primary Nurse Bijal Queen RN and Jose Maria Joel RN performed a dual skin assessment on this patient No impairment noted  Augustus score is 14.    02:36  Spoke with Dr. Shahbaz Dhillon to see if he wanted to order repeat ABG's this morning. Per Dr. Shahbaz Dhillon that decision will be left up to pulmonologist when they come to see pt later this morning. No new orders received. Bijal Queen RN    06:04  Pt has not voided. Has tried bedpan twice, but unsuccessful. Bladder scan shows 339 mL. Notified Dr. Shahbaz Dhillon. Received order to straight cath x1 for retention. Bijal Queen RN    06:20 Straight cath produced 600 mL of hazy joss colored malodorous urine. U/A and UCS collected from straight cath specimen. Will not send specimen to lab, until okay with MD.    07:20  Bedside shift change report given to [facility] [address] (oncoming nurse) by Bijal Queen RN (offgoing nurse). Report included the following information SBAR, Kardex, MAR, Recent Results and Cardiac Rhythm NSR.

## 2018-03-30 NOTE — ED NOTES
Pt refusing to get on bed pan, RN suggested to use a pure wick for pt, pt refusing, pt getting agitated with RN. RN explained to pt that can not get up because oxygen saturations are to unstable and she needs to be on bipap and gets to short of breath with exertion and is unsteady, pt getting agitated and states \"just wont go\" informed pt of options again, pt stopped talking to RN.

## 2018-03-30 NOTE — CONSULTS
Palliative Medicine Consult    Patient Name: Mendez Cortes  YOB: 1955    Date of Initial Consult: 3/30/18  Reason for Consult: Care decisions  Requesting Provider: Dr. Princess Solano  Primary Care Physician: Mable Mccray MD      SUMMARY:   Mendez Cortes is a 58 y.o. with a past history of morbid obesity, hypercapnia respiratory failure, OHS, restrictive lung disease, RA, HTN, who was admitted on 3/29/2018 from home with a diagnosis of a/c hypoxic/hypercapnic respiratory failure. Current medical issues leading to Palliative Medicine involvement include: Care decisions    Chart reviewed/HPI-patient admitted with confusion/respiratory failure and found to have ABG of 7.18/140/79 and placed on BIPAP and  ABG this morning with 7.36/96/70. Patient is more awake. Patient had trilogy at home but removed because she was not using it. Her  states has 4 liters oxygen chronically. SH- and this is second marriage for her. They have been  for 2 years. Her first  passed of cancer. She has no children. PALLIATIVE DIAGNOSES:   1. GOC discussion  2. DNR discussion  3. SOB  4. Chronic respiratory failure  5. Noncompliance  6. Restrictive lung disease  7. Debility       PLAN:   1. Met with patient and her . Patient still with BIPAP in place so difficult to have full discussion with her. Introduced the role of Palliative medicine in her care. Reviewed her current medical issues and they seem to understand the medical issues, although once again difficult for patient to fully discuss as she is wearing BIPAP. Did explain the advanced nature of her lung issues with this chronically elevated CO2 and how this effects her mental status  2. GOC-for now, continue full restorative measures. Patient seems to agree that she will consider trying BIPAP/Trilogy again at home. Explained that she is at very high risk of death if she does not comply.  Her  states he will \"make her do it\". Wants to return home. She has still been working but looking to get on disability  3. AMD-none completed and she understands that her  would be medical decision maker if she is unable  4. Code status-reviewed code status-both pre-arrest and arrest. Patient is very clear on DNAR in arrest state and also states would not want intubated in a pre-arrest situation. This has been reflected in the EMR  5. Symptom management-no acute symptoms for us to manage  6. Psychosocial-appears her  is main support system. She does not have children. Joanne is part of her life and chaplains also have visited during this hospitalization  7. Reviewed my discussion with bedside nurse, Dr. Brooke Faria and Dr. Brayan William  8. Initial consult note routed to primary continuity provider  9. Communicated plan of care with: Palliative IDT       GOALS OF CARE / TREATMENT PREFERENCES:   [====Goals of Care====]  GOALS OF CARE:  Patient/Health Care Proxy Stated Goals: Other (comment) (full restorative measures)      TREATMENT PREFERENCES:   Code Status: DNR    Advance Care Planning:  Advance Care Planning 3/3/2017   Patient's Healthcare Decision Maker is: Legal Next of Al 69   Primary Decision Maker Name Javier Tsai 53.   Primary Decision Maker Relationship to Patient Spouse   Confirm Advance Directive Yes, on file   Does the patient have other document types Power of        Medical Interventions: Limited additional interventions  Other Instructions: The palliative care team has discussed with patient / health care proxy about goals of care / treatment preferences for patient.  [====Goals of Care====]         HISTORY:     History obtained from: chart, patient, spouse    CHIEF COMPLAINT: altered mental status    HPI/SUBJECTIVE:    The patient is:   [x] Verbal and participatory  [] Non-participatory due to:   Patient is verbal but BIPAP in place. Denies any pain. Feels like breathing better overall.     Clinical Pain Assessment (nonverbal scale for severity on nonverbal patients):   Clinical Pain Assessment  Severity: 0            FUNCTIONAL ASSESSMENT:     Palliative Performance Scale (PPS):  PPS: 50       PSYCHOSOCIAL/SPIRITUAL SCREENING:     Advance Care Planning:  Advance Care Planning 3/3/2017   Patient's Healthcare Decision Maker is: Legal Next of Al Hung   Primary Decision Maker Name Javier Mcmahon.   Primary Decision Maker Relationship to Patient Spouse   Confirm Advance Directive Yes, on file   Does the patient have other document types Power of         Any spiritual / Anabaptism concerns:  [] Yes /  [x] No    Caregiver Burnout:  [] Yes /  [x] No /  [] No Caregiver Present      Anticipatory grief assessment:   [x] Normal  / [] Maladaptive       ESAS Anxiety: Anxiety: 1    ESAS Depression: Depression: 0        REVIEW OF SYSTEMS:     Positive and pertinent negative findings in ROS are noted above in HPI. The following systems were [x] reviewed / [] unable to be reviewed as noted in HPI  Other findings are noted below. Systems: constitutional, ears/nose/mouth/throat, respiratory, gastrointestinal, genitourinary, musculoskeletal, integumentary, neurologic, psychiatric, endocrine. Positive findings noted below. Modified ESAS Completed by: provider   Fatigue: 2 Drowsiness: 4   Depression: 0 Pain: 0   Anxiety: 1 Nausea: 0   Anorexia: 0 Dyspnea: 5     Constipation: No              PHYSICAL EXAM:     From RN flowsheet:  Wt Readings from Last 3 Encounters:   03/29/18 283 lb (128.4 kg)   12/08/17 283 lb (128.4 kg)   08/04/17 271 lb 6.4 oz (123.1 kg)     Blood pressure (!) 188/95, pulse 74, temperature 99.5 °F (37.5 °C), resp. rate 20, height 5' 5\" (1.651 m), weight 283 lb (128.4 kg), last menstrual period 02/23/2013, SpO2 95 %.     Pain Scale 1: Numeric (0 - 10)  Pain Intensity 1: 0                 Last bowel movement, if known:     Constitutional: awake and alert, BIPAP in place  Eyes: pupils equal, anicteric  ENMT: no nasal discharge, moist mucous membranes  Cardiovascular: regular rhythm, distal pulses intact  Respiratory: breathing moderately labored, symmetric  Gastrointestinal: soft non-tender, +bowel sounds  Musculoskeletal: no deformity, no tenderness to palpation  Skin: warm, dry  Neurologic: following commands, moving all extremities  Psychiatric: full affect, no hallucinations  Other:       HISTORY:     Principal Problem:    Acute on chronic respiratory failure with hypoxia and hypercapnia (HCC) (8/5/2017)    Active Problems:    Rheumatoid arthritis involving multiple sites with positive rheumatoid factor ([facility] [address] (10/7/2016)      Essential hypertension (10/7/2016)      Acute encephalopathy (3/4/2017)      Class 3 obesity with serious comorbidity and body mass index (BMI) of 45.0 to 49.9 in adult [facility]) (8/5/2017)      Pulmonary hypertension (8/5/2017)      Dyslipidemia, goal LDL below 70 (8/5/2017)      Coronary artery disease involving native heart (8/5/2017)      Past Medical History:   Diagnosis Date    Hypertension     RA (rheumatoid arthritis) ([facility] [address]       Past Surgical History:   Procedure Laterality Date    HX GYN      R vulvectomy      Family History   Problem Relation Age of Onset    Heart Disease Father     Hypertension Father     Stroke Father     Heart Disease Mother     Hypertension Mother     Hypertension Sister     Hypertension Brother       History reviewed, no pertinent family history.   Social History   Substance Use Topics    Smoking status: Former Smoker    Smokeless tobacco: Never Used    Alcohol use Yes      Comment: occasional     Allergies   Allergen Reactions    Bee Sting [Sting, Bee] Anaphylaxis    Codeine Hives and Shortness of Breath     bp drop     Methotrexate Nausea and Vomiting    Penicillins Hives and Shortness of Breath     bp drop      Current Facility-Administered Medications   Medication Dose Route Frequency    predniSONE (DELTASONE) tablet 5 mg  5 mg Oral DAILY WITH BREAKFAST    acetaminophen (TYLENOL) tablet 650 mg  650 mg Oral Q4H PRN    furosemide (LASIX) tablet 40 mg  40 mg Oral DAILY    metoprolol succinate (TOPROL-XL) XL tablet 25 mg  25 mg Oral DAILY    sodium chloride (NS) flush 5-10 mL  5-10 mL IntraVENous Q8H    sodium chloride (NS) flush 5-10 mL  5-10 mL IntraVENous PRN    diphenhydrAMINE (BENADRYL) capsule 25 mg  25 mg Oral Q4H PRN    ondansetron (ZOFRAN) injection 4 mg  4 mg IntraVENous Q4H PRN    enoxaparin (LOVENOX) injection 40 mg  40 mg SubCUTAneous Q12H    arformoterol (BROVANA) neb solution 15 mcg  15 mcg Nebulization BID RT    budesonide (PULMICORT) 500 mcg/2 ml nebulizer suspension  500 mcg Nebulization BID RT          LAB AND IMAGING FINDINGS:     Lab Results   Component Value Date/Time    WBC 5.8 03/30/2018 04:17 AM    HGB 11.1 (L) 03/30/2018 04:17 AM    PLATELET 673 62/21/1095 04:17 AM     Lab Results   Component Value Date/Time    Sodium 146 (H) 03/30/2018 04:17 AM    Potassium 4.9 03/30/2018 04:17 AM    Chloride 99 03/30/2018 04:17 AM    CO2 >45 (HH) 03/30/2018 04:17 AM    BUN 10 03/30/2018 04:17 AM    Creatinine 0.52 (L) 03/30/2018 04:17 AM    Calcium 9.1 03/30/2018 04:17 AM    Magnesium 2.1 03/30/2018 04:17 AM      Lab Results   Component Value Date/Time    AST (SGOT) 19 03/29/2018 07:59 PM    Alk.  phosphatase 98 03/29/2018 07:59 PM    Protein, total 7.3 03/29/2018 07:59 PM    Albumin 2.9 (L) 03/29/2018 07:59 PM    Globulin 4.4 (H) 03/29/2018 07:59 PM     No results found for: INR, PTMR, PTP, PT1, PT2, APTT   No results found for: IRON, FE, TIBC, IBCT, PSAT, FERR   Lab Results   Component Value Date/Time    pH 7.37 03/30/2018 08:08 AM    PCO2 96 (H) 03/30/2018 08:08 AM    PO2 71 (L) 03/30/2018 08:08 AM     No components found for: GLPOC   No results found for: CPK, CKMB             Total time: 70  Counseling / coordination time, spent as noted above: 60  > 50% counseling / coordination?: yes    Prolonged service was provided for  []30 min   []75 min in face to face time in the presence of the patient, spent as noted above. Time Start:   Time End:   Note: this can only be billed with 73135 (initial) or 33775 (follow up). If multiple start / stop times, list each separately.

## 2018-03-30 NOTE — PROGRESS NOTES
I attempted to meet with pt who is confused . Pt  Was admitted with acute on chronic respiratory failure with hypercapnia and hypoxia. I contacted pt.'s Cindra Fraction @ [phone]. He states pt kept her last name when they were .  confirmed that they live @ the address on demographic sheet. Prior to her illness and subsequent hospitalization,pt was independent in all aspects. To his Willim Lamp has never had home health services previously. Pt.'s PCP is Dr Ovidio Zepeda. Outpatient pulmonologist is Dr Kacie Connor. Per ,pt is on continuous oxygen @ 4 liters nc. I will remind  of the need to bring a portable tank to the hospital when discharged if pt is discharged home. Pt received a home nebulizer in 2017 . Oxygen is serviced through [facility] had a trilogy set up for her in march 2017 through [facility] but pt contacted [employer] on 4/28/17 requesting [employer]  the trilogy because she did not want to use the trilogy. [employer] picked up the trilogy on 5/3/18. I discussed pt with pulmonology PA who is ordering  a Palliative Medicine consult to discuss goals of care with pt and her  as pt has severe hypercapnia with refusal to wear Trilogy/non-invasive ventilator @ home. I updated attending. Chau Javed    I discussed pt with pulmonology PA and attending. Chau Javed

## 2018-03-30 NOTE — ED NOTES
TRANSFER - OUT REPORT:    Verbal report given to Shirley Negron (name) on Arnav Craig  being transferred to [facility] ([facility]) for routine progression of care       Report consisted of patients Situation, Background, Assessment and   Recommendations(SBAR). Information from the following report(s) SBAR, ED Summary, MAR, Recent Results and Cardiac Rhythm NSR was reviewed with the receiving nurse. Lines:   Peripheral IV 03/29/18 Left Hand (Active)   Site Assessment Clean, dry, & intact 3/29/2018  7:49 PM   Phlebitis Assessment 0 3/29/2018  7:49 PM   Infiltration Assessment 0 3/29/2018  7:49 PM   Dressing Status Clean, dry, & intact 3/29/2018  7:49 PM   Dressing Type Tape;Transparent 3/29/2018  7:49 PM   Hub Color/Line Status Pink;Flushed;Patent 3/29/2018  7:49 PM   Action Taken Blood drawn 3/29/2018  7:49 PM       Peripheral IV 03/29/18 Right Antecubital (Active)   Site Assessment Clean, dry, & intact 3/29/2018  8:17 PM   Phlebitis Assessment 0 3/29/2018  8:17 PM   Infiltration Assessment 0 3/29/2018  8:17 PM   Dressing Status Clean, dry, & intact 3/29/2018  8:17 PM   Dressing Type Tape 3/29/2018  8:17 PM   Hub Color/Line Status Pink;Flushed;Patent 3/29/2018  8:17 PM   Action Taken Blood drawn 3/29/2018  8:17 PM        Opportunity for questions and clarification was provided.       Patient transported with:   Monitor  Registered Nurse

## 2018-03-30 NOTE — PROGRESS NOTES
[facility]: MED RECONCILIATION    Comments/Recommendations:    Patient unable to complete medication reconciliation due to acute state.  brought in medication bottles and thinks it is all of patient's medications but it not certain.  In medication bag is leucovorin 5mg to be taken 24 hours after MTX. There is no prescription for MTX in bag and  and patient cannot confirm if patient is taking this medication. Per Rx query MTX last filled 9/22/2017 for 0.5mL (concentration 50mg/2mL) of the IM injection to be given once weekly.  Instructed  to take medication bottles home and he agreed.  Confirmed allergies and pharmacy. Medications added:     · MVI  · leucovorin    Medications removed:    · ASA  · Vit D3  · Potassium Chloride    Medications adjusted:    · Advair Diskus 250/501 puff BID adjusted from Advair /21. Information obtained from: , Rx query, medication bottles     Allergies: Bee sting [sting, bee]; Codeine; Methotrexate; and Penicillins    Prior to Admission Medications:     Medication Documentation Review Audit       Reviewed by Darian Aragon (Pharmacist) on 03/29/18 at 2054         Medication Sig Documenting Provider Last Dose Status Taking?      acetaminophen (TYLENOL) 325 mg tablet Take 650 mg by mouth every four (4) hours as needed for Pain. Historical Provider  Active Yes    aspirin delayed-release tablet 81 mg  Aba Mendez MD  Active No    ergocalciferol (VITAMIN D2) 50,000 unit capsule Take 50,000 Units by mouth Every Friday. On Friday   Indications: Vitamin D Deficiency Historical Provider 3/23/2018 Active Yes    fluticasone-salmeterol (ADVAIR DISKUS) 250-50 mcg/dose diskus inhaler Take 1 Puff by inhalation every twelve (12) hours. Historical Provider 3/29/2018 AM Active Yes    furosemide (LASIX) 40 mg tablet Take 40 mg by mouth daily.  Historical Provider 3/29/2018 AM Active Yes    furosemide (LASIX) tablet 40 mg  Aba Mendez MD Active No    leucovorin calcium (WELLCOVORIN) 5 mg tablet Take 5 mg by mouth every seven (7) days. To be taken 24 hours after methotrexate. Historical Provider  Active No             Med Note (JUANJO COTTRELL   Thu Mar 29, 2018  8:53 PM): This medication is in patient's medication bag and is to be taken 24 hours after MTX. However, patient and  unsure if she is currently taking MTX. Neither  or patient can confirm last dose of leucovorin. metoprolol succinate (TOPROL-XL) 25 mg XL tablet Take 1 Tab by mouth daily. Royal Sapna MD 3/29/2018 AM Active Yes    multivitamin (ONE A DAY) tablet Take 1 Tab by mouth daily. Historical Provider 3/29/2018 AM Active Yes    ORENCIA 125 mg/mL syrg 125 mg by SubCUTAneous route every Sunday. On Saturday Historical Provider 3/25/2018 Active Yes             Med Note (JUANJO COTTRELL   Thu Mar 29, 2018  8:51 PM):        predniSONE (DELTASONE) 5 mg tablet TK 1 TO 2 TS PO QD PRN Historical Provider  Active Yes             Med Note (JUANJO COTTRELL   Thu Mar 29, 2018  8:54 PM):        vitamins  A,C,E-zinc-copper (OCUVITE PRESERVISION) [phone] unit-mg-unit tab tablet Take 1 Tab by mouth two (2) times a day.  Historical Provider 3/29/2018 AM Active Yes                  Thank Seema Bernal, PharmD   Contact: [phone]

## 2018-03-30 NOTE — PROGRESS NOTES
Bedside shift change report given to Jhoana Penny RN (oncoming nurse) by Summer Kim RN (offgoing nurse). Report included the following information SBAR, Kardex, ED Summary, Intake/Output, MAR and Recent Results. SHIFT SUMMARY:    0800 Initial Shift  Assessment performed           Mental Status: Oriented to person and place. Disoriented to situation and time. Respiratory: Bipap           Cardiac: Sinus with occasional PVCs           GI/: Incont  0900 Patient very agitated and pulled out her IV and pulling at bipap. 1130 Bladder scanned patient and she had 936ML in her bladder. Order placed to put in amezcua. 1  at bedside. 1200 Patient very confused requiring sitter at bedside to keep bipap machine on.  1600 Reassessment performed. Patient still confused pulling at bipap from time to time. 1705 Routine check on patient and she is complaining of being hot and looks flushed in the face. Patients temp is 99.5 axillary. 650mg tylenol given. 1750 Patients temperature is down to 99.0 axillary. 80  at bedside. 1957 Respiratory titrated FIO2 down to 45%    Bedside shift change report given to Damir Edgar (oncoming nurse) by Jhoana Penny RN (offgoing nurse). Report included the following information SBAR, Kardex, ED Summary, Intake/Output, MAR and Recent Results.

## 2018-03-30 NOTE — CONSULTS
[facility] [address]   : 1955 Admit Date: 3/29/2018   Phone: [phone]  Room: [address] [place] [address]   PCP: Olesya Mcgarry MD  MRN: [id number]   Date: 3/30/2018  Code: Full Code          Chart and notes reviewed. Data reviewed. I review the patient's current medications in the medical record at each encounter. I have evaluated and examined the patient. HPI:    8:19 AM       History was obtained from chart. I was asked by Na Pedraza MD to see Tanya Varela in consultation for a chief complaint of respiratory failure and BiPAP management. Ms. Uma Soto is a 58year old female with hypoxic hypercapnic respiratory failure, OHS, morbid obesity, and restrictive lung disease  who presented to the [address] [place] [address] ED with altered mental status. Patient still with some confusion this morning and no family present. According to her chart, family reported altered mental status for the past week with associated weakness. Family also reported worsening O2 sats. Patient on 2L at baseline. Presented with similar issues last year and was prescribed Trilogy at discharge which she has refused to use because she does not like it according to our outpatient records. It was not \"taken away\" per her DME [employer] as  reported, patient simply refused to use it any longer. After her last admission, patient was evaluated for sleep apnea, but insisted on sleeping sitting up in the recliner. She was also quite upset about her sleep study being video recorded (protocol) and had only 50% sleep efficiency. Results of sleep study limited as a result and repeat in-lab vs home study recommended but patient refused. 2017 PFTs with no evidece of obstruction, but FEV1 and FVC were reduced and lung volumes were consistent with moderate restrictive defect.      This morning, ROS difficult to obtain from patient as she remains confused and seems to zone out in the middle of her thoughts. She does report feeling SOB and mentions having pain, but can't elaborate. Of note, she also denies any history of prior use of NIV or past sleep evaluation. CXR personally visualized and compared to images from earlier this month. There are diminished lung volumes and increased bibasilar airspace disease which likely represents atelectasis.      WBC 5.8  Hgb 11.1  Na 146  Bicarb > 45  proBNP 184  Trop < 0.04  ABG 7.18/140/79/52/91 at admission; this morning, improvement after NIV with ABG 7.368/96.4/70.7/54.2    Past Medical History:   Diagnosis Date    Hypertension     RA (rheumatoid arthritis) ([facility]        Past Surgical History:   Procedure Laterality Date    HX GYN      R vulvectomy       Family History   Problem Relation Age of Onset    Heart Disease Father     Hypertension Father     Stroke Father     Heart Disease Mother     Hypertension Mother     Hypertension Sister     Hypertension Brother        Social History   Substance Use Topics    Smoking status: Former Smoker    Smokeless tobacco: Never Used    Alcohol use Yes      Comment: occasional       Allergies   Allergen Reactions    Bee Sting [Sting, Bee] Anaphylaxis    Codeine Hives and Shortness of Breath     bp drop     Methotrexate Nausea and Vomiting    Penicillins Hives and Shortness of Breath     bp drop       Current Facility-Administered Medications   Medication Dose Route Frequency    predniSONE (DELTASONE) tablet 5 mg  5 mg Oral DAILY WITH BREAKFAST    acetaminophen (TYLENOL) tablet 650 mg  650 mg Oral Q4H PRN    furosemide (LASIX) tablet 40 mg  40 mg Oral DAILY    metoprolol succinate (TOPROL-XL) XL tablet 25 mg  25 mg Oral DAILY    sodium chloride (NS) flush 5-10 mL  5-10 mL IntraVENous Q8H    sodium chloride (NS) flush 5-10 mL  5-10 mL IntraVENous PRN    diphenhydrAMINE (BENADRYL) capsule 25 mg  25 mg Oral Q4H PRN    ondansetron (ZOFRAN) injection 4 mg  4 mg IntraVENous Q4H PRN    enoxaparin (LOVENOX) injection 40 mg  40 mg SubCUTAneous Q12H    arformoterol (BROVANA) neb solution 15 mcg  15 mcg Nebulization BID RT    budesonide (PULMICORT) 500 mcg/2 ml nebulizer suspension  500 mcg Nebulization BID RT         REVIEW OF SYSTEMS   12 point ROS negative except as stated in the HPI. Physical Exam:   Visit Vitals    /77    Pulse 81    Temp 98.2 °F (36.8 °C)    Resp 15    Ht 5' 5\" (1.651 m)    Wt 128.4 kg (283 lb)    SpO2 91%    BMI 47.09 kg/m2       General:  Alert, cooperative, confusion, appears older than stated age. Head:  Normocephalic, without obvious abnormality, atraumatic. Eyes:  Conjunctivae/corneas clear. Nose: Nares normal. Septum midline. Mucosa normal.    Throat: Lips, mucosa, and tongue normal.  Class 4 airway. Neck: Thick, supple, symmetrical, trachea midline, no adenopathy. Lungs:   Diminished with shallow inspiratory effort, but clear to auscultation bilaterally. Chest wall:  No tenderness or deformity. Heart:  Regular rate and rhythm, S1, S2 normal, no murmur, click, rub or gallop. Abdomen: Morbidly obese, soft, non-tender. Bowel sounds normal. No masses,  No organomegaly. Extremities: Extremities normal, atraumatic, no cyanosis or edema. Pulses: 2+ and symmetric all extremities. Skin: Skin color, texture, turgor normal. No rashes or lesions   Lymph nodes: Cervical, supraclavicular nodes normal.   Neurologic: Grossly nonfocal, follows commands, confused, poor insight.        Lab Results   Component Value Date/Time    Sodium 146 (H) 03/30/2018 04:17 AM    Potassium 4.9 03/30/2018 04:17 AM    Chloride 99 03/30/2018 04:17 AM    CO2 >45 (HH) 03/30/2018 04:17 AM    BUN 10 03/30/2018 04:17 AM    Creatinine 0.52 (L) 03/30/2018 04:17 AM    Glucose 148 (H) 03/30/2018 04:17 AM    Calcium 9.1 03/30/2018 04:17 AM    Magnesium 2.1 03/30/2018 04:17 AM    Lactic acid 0.4 03/29/2018 07:59 PM       Lab Results   Component Value Date/Time    WBC 5.8 03/30/2018 04:17 AM HGB 11.1 (L) 03/30/2018 04:17 AM    PLATELET 034 40/36/9168 04:17 AM    .7 (H) 03/30/2018 04:17 AM       Lab Results   Component Value Date/Time    AST (SGOT) 19 03/29/2018 07:59 PM    Alk. phosphatase 98 03/29/2018 07:59 PM    Protein, total 7.3 03/29/2018 07:59 PM    Albumin 2.9 (L) 03/29/2018 07:59 PM    Globulin 4.4 (H) 03/29/2018 07:59 PM       No results found for: IRON, FE, TIBC, IBCT, PSAT, FERR    Lab Results   Component Value Date/Time    Sed rate (ESR) 42 (H) 03/24/2017 11:55 AM    TSH 0.89 03/03/2017 09:25 AM        Lab Results   Component Value Date/Time    PH 7.37 03/30/2018 08:08 AM    PCO2 96 (H) 03/30/2018 08:08 AM    PO2 71 (L) 03/30/2018 08:08 AM    HCO3 54 (H) 03/30/2018 08:08 AM    FIO2 55 03/30/2018 08:08 AM       Lab Results   Component Value Date/Time    Troponin-I, Qt. <0.04 03/29/2018 07:59 PM        No results found for: CULT    No results found for: TOXA1, RPR, HBCM, HBSAG, HAAB, HCAB1, HAAT, G6PD, CRYAC, HIVGT, HIVR, HIV1, HIV12, HIVPC, HIVRPI    No results found for: VANCT, CPK    No results found for: COLOR, APPRN, SPGRU, TABATHA, PROTU, GLUCU, KETU, BILU, BLDU, UROU, ASHKAN, LEUKU, WBCU, RBCU, UEPI, BACTU, CASTS, UCRY    IMPRESSION  · Acute/chronic hypoxic hypercapnic respiratory failure  · OHS  · Atelectasis  · Chronic hypercapnia/CO2 retention  · Restrictive lung disease  · AMS  · Dyspnea  · HTN  · Hx of RA  · Morbid obesity    PLAN  · Continuous BiPAP  · Brovana/Pulmicort nebs in substitution for home Advair  · Home prednisone for RA; no need for additional steroids at this time from a pulmonary standpoint. · Continue home Lasix and metoprolol per primary team  · Palliative Care consult to discuss goals of care given her refusal to use home NIV or have additional evaluation for sleep apnea in the setting of severe hypercapnia. Did call  and discussed with him over the phone the severity of her hypercapnia.   · DVT prophylaxis: Lovenox    Patient is critically ill requiring continuous BiPAP for severe hypercapnia and is high risk for decompensation. Discussed with RT, nursing, ad . Thank you for allowing us to participate in the care of this patient.       Joy Merino

## 2018-03-31 LAB
ANION GAP SERPL CALC-SCNC: ABNORMAL MMOL/L (ref 5–15)
ATRIAL RATE: 84 BPM
BUN SERPL-MCNC: 15 MG/DL (ref 6–20)
BUN/CREAT SERPL: 32 (ref 12–20)
CALCIUM SERPL-MCNC: 8.5 MG/DL (ref 8.5–10.1)
CALCULATED P AXIS, ECG09: 35 DEGREES
CALCULATED R AXIS, ECG10: -8 DEGREES
CALCULATED T AXIS, ECG11: 44 DEGREES
CHLORIDE SERPL-SCNC: 100 MMOL/L (ref 97–108)
CO2 SERPL-SCNC: 45 MMOL/L (ref 21–32)
CREAT SERPL-MCNC: 0.47 MG/DL (ref 0.55–1.02)
DIAGNOSIS, 93000: NORMAL
ERYTHROCYTE [DISTWIDTH] IN BLOOD BY AUTOMATED COUNT: 15.2 % (ref 11.5–14.5)
GLUCOSE SERPL-MCNC: 93 MG/DL (ref 65–100)
HCT VFR BLD AUTO: 36.1 % (ref 35–47)
HGB BLD-MCNC: 10.6 G/DL (ref 11.5–16)
MAGNESIUM SERPL-MCNC: 1.8 MG/DL (ref 1.6–2.4)
MCH RBC QN AUTO: 28 PG (ref 26–34)
MCHC RBC AUTO-ENTMCNC: 29.4 G/DL (ref 30–36.5)
MCV RBC AUTO: 95.5 FL (ref 80–99)
NRBC # BLD: 0 K/UL (ref 0–0.01)
NRBC BLD-RTO: 0 PER 100 WBC
P-R INTERVAL, ECG05: 158 MS
PHOSPHATE SERPL-MCNC: 2.8 MG/DL (ref 2.6–4.7)
PLATELET # BLD AUTO: 194 K/UL (ref 150–400)
PMV BLD AUTO: 9.3 FL (ref 8.9–12.9)
POTASSIUM SERPL-SCNC: 3.4 MMOL/L (ref 3.5–5.1)
Q-T INTERVAL, ECG07: 366 MS
QRS DURATION, ECG06: 82 MS
QTC CALCULATION (BEZET), ECG08: 432 MS
RBC # BLD AUTO: 3.78 M/UL (ref 3.8–5.2)
SODIUM SERPL-SCNC: 143 MMOL/L (ref 136–145)
VENTRICULAR RATE, ECG03: 84 BPM
WBC # BLD AUTO: 7.6 K/UL (ref 3.6–11)

## 2018-03-31 PROCEDURE — 74011250636 HC RX REV CODE- 250/636: Performed by: INTERNAL MEDICINE

## 2018-03-31 PROCEDURE — 94660 CPAP INITIATION&MGMT: CPT

## 2018-03-31 PROCEDURE — 74011250637 HC RX REV CODE- 250/637: Performed by: INTERNAL MEDICINE

## 2018-03-31 PROCEDURE — 36415 COLL VENOUS BLD VENIPUNCTURE: CPT | Performed by: INTERNAL MEDICINE

## 2018-03-31 PROCEDURE — 51798 US URINE CAPACITY MEASURE: CPT

## 2018-03-31 PROCEDURE — 65660000000 HC RM CCU STEPDOWN

## 2018-03-31 PROCEDURE — 94640 AIRWAY INHALATION TREATMENT: CPT

## 2018-03-31 PROCEDURE — 83735 ASSAY OF MAGNESIUM: CPT | Performed by: INTERNAL MEDICINE

## 2018-03-31 PROCEDURE — 74011000250 HC RX REV CODE- 250: Performed by: INTERNAL MEDICINE

## 2018-03-31 PROCEDURE — 80048 BASIC METABOLIC PNL TOTAL CA: CPT | Performed by: INTERNAL MEDICINE

## 2018-03-31 PROCEDURE — 85027 COMPLETE CBC AUTOMATED: CPT | Performed by: INTERNAL MEDICINE

## 2018-03-31 PROCEDURE — 77030027138 HC INCENT SPIROMETER -A

## 2018-03-31 PROCEDURE — 84100 ASSAY OF PHOSPHORUS: CPT | Performed by: INTERNAL MEDICINE

## 2018-03-31 PROCEDURE — 74011636637 HC RX REV CODE- 636/637: Performed by: INTERNAL MEDICINE

## 2018-03-31 RX ORDER — POTASSIUM CHLORIDE 750 MG/1
40 TABLET, FILM COATED, EXTENDED RELEASE ORAL EVERY 4 HOURS
Status: COMPLETED | OUTPATIENT
Start: 2018-03-31 | End: 2018-03-31

## 2018-03-31 RX ADMIN — ARFORMOTEROL TARTRATE 15 MCG: 15 SOLUTION RESPIRATORY (INHALATION) at 20:22

## 2018-03-31 RX ADMIN — ENOXAPARIN SODIUM 40 MG: 40 INJECTION SUBCUTANEOUS at 20:49

## 2018-03-31 RX ADMIN — PREDNISONE 5 MG: 5 TABLET ORAL at 07:27

## 2018-03-31 RX ADMIN — ARFORMOTEROL TARTRATE 15 MCG: 15 SOLUTION RESPIRATORY (INHALATION) at 08:12

## 2018-03-31 RX ADMIN — METOPROLOL SUCCINATE 25 MG: 25 TABLET, EXTENDED RELEASE ORAL at 07:28

## 2018-03-31 RX ADMIN — FUROSEMIDE 40 MG: 40 TABLET ORAL at 07:27

## 2018-03-31 RX ADMIN — POTASSIUM CHLORIDE 40 MEQ: 750 TABLET, EXTENDED RELEASE ORAL at 12:30

## 2018-03-31 RX ADMIN — Medication 10 ML: at 13:42

## 2018-03-31 RX ADMIN — Medication 10 ML: at 05:07

## 2018-03-31 RX ADMIN — BUDESONIDE 500 MCG: 0.5 INHALANT RESPIRATORY (INHALATION) at 20:22

## 2018-03-31 RX ADMIN — ENOXAPARIN SODIUM 40 MG: 40 INJECTION SUBCUTANEOUS at 09:32

## 2018-03-31 RX ADMIN — BUDESONIDE 500 MCG: 0.5 INHALANT RESPIRATORY (INHALATION) at 08:12

## 2018-03-31 RX ADMIN — Medication 10 ML: at 20:50

## 2018-03-31 RX ADMIN — POTASSIUM CHLORIDE 40 MEQ: 750 TABLET, EXTENDED RELEASE ORAL at 07:27

## 2018-03-31 NOTE — PROGRESS NOTES
[address]  [address]  [address] [address] [address]  [phone]      PROGRESS NOTE      Name: Nicky Moody MRN: [id number]   : 1955 Hospital: [facility]   Date: 3/31/2018  Admission Date: 3/29/2018     Chart and notes reviewed. Data reviewed. I have evaluated and examined the patient. Overnight events reviewed: Came off BiPAP to eat. Is about to start working on her 2nd breakfast now. Hoffman cath out. Mentation better today. Got Lasix PO today. ROS: Reviewed. None. Vital Signs:  Visit Vitals    /72 (BP 1 Location: Left arm, BP Patient Position: At rest)    Pulse 66    Temp 97.9 °F (36.6 °C)    Resp 21    Ht 5' 5\" (1.651 m)    Wt 128.4 kg (283 lb)    LMP 2013    SpO2 95%    BMI 47.09 kg/m2       O2 Device: BIPAP   O2 Flow Rate (L/min): 15 l/min   Temp (24hrs), Av.5 °F (36.9 °C), Min:97.9 °F (36.6 °C), Max:99.5 °F (37.5 °C)       Intake/Output:   Last shift:       0701 -  1900  In: -   Out: 300 [Urine:300]  Last 3 shifts:  190 -  0700  In: -   Out: 7631 [Urine:3050]    Intake/Output Summary (Last 24 hours) at 18 1227  Last data filed at 18 0800   Gross per 24 hour   Intake                0 ml   Output             1750 ml   Net            -1750 ml        Telemetry:     Physical Exam:   General:  Alert, cooperative, morbidly obese; wearing BiPAP, no distress. Head:  Normocephalic, without obvious abnormality, atraumatic. Eyes:  Conjunctivae/corneas clear. PERRL, EOMs intact. Nose: Nares normal.   Throat: Lips, mucosa, and tongue normal.   Neck: Thick. Lungs:   Decreased sounds bilaterally but no wheezing. Chest wall:  No tenderness or deformity. Heart:  Regular rate and rhythm, S1, S2 normal, no murmur, click, rub or gallop. Abdomen:   Soft, non-tender. Bowel sounds normal.   Extremities: Extremities normal, atraumatic; trace BLE edema. Skin: Warm, dry. Neurologic: Grossly nonfocal.     DATA:  MAR reviewed and pertinent medications noted or modified as needed    Labs:  Recent Labs      03/31/18 0326 03/30/18 0417 03/29/18 1959   WBC  7.6  5.8  6.8   HGB  10.6*  11.1*  11.7   HCT  36.1  40.9  42.0   PLT  194  184  198     Recent Labs      03/31/18 0326 03/30/18 0417 03/29/18 1959   NA  143  146*  146*   K  3.4*  4.9  4.4   CL  100  99  98   CO2  45*  >45*  >45*   GLU  93  148*  94   BUN  15  10  9   CREA  0.47*  0.52*  0.43*   CA  8.5  9.1  9.1   MG  1.8  2.1   --    PHOS  2.8   --    --    ALB   --    --   2.9*   SGOT   --    --   19   ALT   --    --   32       Imaging:  I have personally reviewed the patients radiographs and reports. IMPRESSION:   · Acute/chronic hypoxic hypercapnic respiratory failure  · OHS  · Atelectasis  · Chronic hypercapnia/CO2 retention--ABG better today. · Restrictive lung disease  · AMS  · Dyspnea  · HTN  · Hx of RA  · Morbid obesity      PLAN:   · Continue BiPAP--can come on and off today as needed; must wear at night and when sleeping. · Would benefit from home BiPAP or NIV/Trilogy at home for chronic respiratory failure. · Brovana/Pulmicort nebs in substitution for home Advair  · Home prednisone for RA; no need for additional steroids at this time from a pulmonary standpoint. · Continue home Lasix and metoprolol per primary team  · Appreciate palliative care consult  · DVT prophylaxis: Lovenox     Patient is critically ill requiring continuous BiPAP for severe hypercapnia and is high risk for decompensation. She is DNR. Will see again on Monday. Please notify if need us to see her Sunday.          Jamir Callahan MD

## 2018-03-31 NOTE — PROGRESS NOTES
[address]  [address] [facility], [place], [address] [place] [address]  [phone]      Medical Progress Note      NAME: Aleksandar Stevenson   :  1955  MRM:  [id number]    Date/Time: 3/31/2018  7:52 AM          Subjective:     Chief Complaint:  Confusion: again more alert this AM, off BiPAP getting ready to eat, did not do well off it yesterday but states she feels better    ROS: (bold if positive, if negative)       Cough         SOB/FOX        Tolerating Diet        Unable to obtain due to confusion       Objective:       Vitals:          Last 24hrs VS reviewed since prior progress note.  Most recent are:    Visit Vitals    /72    Pulse 73    Temp 97.9 °F (36.6 °C)    Resp 29    Ht 5' 5\" (1.651 m)    Wt 128.4 kg (283 lb)    SpO2 94%    BMI 47.09 kg/m2     SpO2 Readings from Last 6 Encounters:   18 94%   17 94%   17 (P) 94%   17 99%   17 95%   17 98%    O2 Flow Rate (L/min): 15 l/min       Intake/Output Summary (Last 24 hours) at 18 8394  Last data filed at 18 0606   Gross per 24 hour   Intake                0 ml   Output             2450 ml   Net            -2450 ml          Exam:     Physical Exam:    Gen:  Well-developed, morbidly obese, in no acute distress  HEENT:  Pink conjunctivae, PERRL, hearing intact to voice, moist mucous membranes  Neck:  Supple, without masses, thyroid non-tender  Resp:  No accessory muscle use, decreased BS RLL but improve with coughing, no wheezing, prolonged expiratory phase  Card:  No murmurs, normal S1, S2 without thrills, bruits or peripheral edema  Abd:  Soft, non-tender, non-distended, normoactive bowel sounds are present, no palpable organomegaly and no detectable hernias  Lymph:  No cervical or inguinal adenopathy  Musc:  No cyanosis or clubbing  Skin:  No rashes or ulcers, skin turgor is good  Neuro:  Cranial nerves are grossly intact, no focal motor weakness, follows commands appropriately  Psych:  Poor insight, oriented to person, place but not time, alert       Telemetry reviewed:   normal sinus rhythm    Medications Reviewed: (see below)    Lab Data Reviewed: (see below)    ______________________________________________________________________    Medications:     Current Facility-Administered Medications   Medication Dose Route Frequency    potassium chloride SR (KLOR-CON 10) tablet 40 mEq  40 mEq Oral Q4H    predniSONE (DELTASONE) tablet 5 mg  5 mg Oral DAILY WITH BREAKFAST    hydrALAZINE (APRESOLINE) 20 mg/mL injection 20 mg  20 mg IntraVENous Q6H PRN    acetaminophen (TYLENOL) tablet 650 mg  650 mg Oral Q4H PRN    furosemide (LASIX) tablet 40 mg  40 mg Oral DAILY    metoprolol succinate (TOPROL-XL) XL tablet 25 mg  25 mg Oral DAILY    sodium chloride (NS) flush 5-10 mL  5-10 mL IntraVENous Q8H    sodium chloride (NS) flush 5-10 mL  5-10 mL IntraVENous PRN    diphenhydrAMINE (BENADRYL) capsule 25 mg  25 mg Oral Q4H PRN    ondansetron (ZOFRAN) injection 4 mg  4 mg IntraVENous Q4H PRN    enoxaparin (LOVENOX) injection 40 mg  40 mg SubCUTAneous Q12H    arformoterol (BROVANA) neb solution 15 mcg  15 mcg Nebulization BID RT    budesonide (PULMICORT) 500 mcg/2 ml nebulizer suspension  500 mcg Nebulization BID RT            Lab Review:     Recent Labs      03/31/18   0326  03/30/18 0417 03/29/18 1959   WBC  7.6  5.8  6.8   HGB  10.6*  11.1*  11.7   HCT  36.1  40.9  42.0   PLT  194  184  198     Recent Labs      03/31/18   0326 03/30/18 0417 03/29/18 1959   NA  143  146*  146*   K  3.4*  4.9  4.4   CL  100  99  98   CO2  45*  >45*  >45*   GLU  93  148*  94   BUN  15  10  9   CREA  0.47*  0.52*  0.43*   CA  8.5  9.1  9.1   MG  1.8  2.1   --    PHOS  2.8   --    --    ALB   --    --   2.9*   TBILI   --    --   0.3   SGOT   --    --   19   ALT   --    --   32     No results found for: [address]  [facility]      03/30/18   0808  03/29/18 2055 03/29/18   1950   PH  7.37 7.22*  7.18*   PCO2  96*  140*  140*   PO2  71*  66*  79*   HCO3  54*  56*  52*   FIO2  55  55  60     No results for input(s): INR in the last 72 hours.     No lab exists for component: INREXT, INREXT  No results found for: SDES  Lab Results   Component Value Date/Time    Culture result: NO GROWTH AFTER 10 HOURS 03/29/2018 07:59 PM            Assessment:     Principal Problem:    Acute on chronic respiratory failure with hypoxia and hypercapnia (HCC) (8/5/2017)    Active Problems:    Rheumatoid arthritis involving multiple sites with positive rheumatoid factor ([facility] (10/7/2016)      Essential hypertension (10/7/2016)      Acute encephalopathy (3/4/2017)      Class 3 obesity with serious comorbidity and body mass index (BMI) of 45.0 to 49.9 in [facility]) (8/5/2017)      Pulmonary hypertension (8/5/2017)      Dyslipidemia, goal LDL below 70 (8/5/2017)      Coronary artery disease involving native heart (8/5/2017)           Plan:     Principal Problem:    Acute on chronic respiratory failure with hypoxia and hypercapnia (HCC) (8/5/2017)   - continue BiPAP   - Pulmonary input appreciated   - needa home respiratory support arranged   - admission due to non-compliance    Active Problems:    Rheumatoid arthritis involving multiple sites with positive rheumatoid factor ([facility] (10/7/2016)   - continue oral prednisone      Essential hypertension (10/7/2016)   - BP okay, follow on home meds      Acute encephalopathy (3/4/2017)   - POA, due to hypercapnea, clearing       Class 3 obesity with serious comorbidity and body mass index (BMI) of 45.0 to 49.9 in [facility]) (8/5/2017)   - counseled on weight loss       Pulmonary hypertension (8/5/2017)   - likely due to obesity/hypoventilation      Dyslipidemia, goal LDL below 70 (8/5/2017)   - not on statin, outpatient follow up      Coronary artery disease involving native heart (8/5/2017)   - stable, continue cardiac meds      Total time spent with patient: 25 minutes Care Plan discussed with: Patient, Care Manager and Nursing Staff    Discussed:  Code Status, Care Plan and D/C Planning    Prophylaxis:  Lovenox    Disposition:  HH PT, OT, RN           ___________________________________________________    Attending Physician: Reji Sykes MD

## 2018-03-31 NOTE — PROGRESS NOTES
SHIFT REPORT:  1900- Bedside shift change report given to Hanane Conway RN (oncoming nurse) by Vonna Severs F.,RN (offgoing nurse). Report included the following information SBAR, Kardex, Procedure Summary, Intake/Output, Recent Results and Cardiac Rhythm. SHIFT SUMMARY:  2100-back and forth between N/c for breaks and BIPAP as she rests better on Bipap  0645-up to [facility] to void; on N/C 6l; sitting on side of bed  END OF SHIFT REPORT:  0700-Bedside shift change report given to Shira Suresh RN (oncoming nurse) by Hanane Conway RN (offgoing nurse). Report included the following information SBAR, Kardex, Procedure Summary, Intake/Output, Recent Results and Cardiac Rhythm .

## 2018-03-31 NOTE — PROGRESS NOTES
Bedside and Verbal shift change report given to Vita Zheng (oncoming nurse) by Babak Bansal RN (offgoing nurse). Report included the following information SBAR, Kardex, ED Summary, Intake/Output, Recent Results and Cardiac Rhythm SR.     2345 - Assessment complete. Patient alert to self and place. No complaints of pain at this time. 0330 - Reassessment complete. Patient much more alert and oriented. Able to give month and year as well as a little more detail as to why in the hospital.     Bedside and Verbal shift change report given to Wilmer Phillips RN (oncoming nurse) by Keegan Lomas RN (offgoing nurse).  Report included the following information SBAR, Kardex, ED Summary, Intake/Output, Recent Results and Cardiac Rhythm SR.

## 2018-03-31 NOTE — PROGRESS NOTES
Shift Summary    0715 Shift report with nightshift RN. Patient alert and oriented and following commands. Patient on BiPAP and tolerating well. Amezcua patent and draining. Patient asking for water and food. Sitter at bedside placed order. Will continue to follow    0800 Tray arrives. Place patient on 6L nasal cannula during meal. MD Min Jasso at bedside to assess    0815 After assisting patient with ADL's Patient placed back on BiPAP. Will take out amezcua this AM.     1372 Removed amezcua per protocol. MD Mendoza at bedside to assess. Regular breakfast tray arrives. Patient placed on 6L. Sitter at bedside. 2727 [facility] rounds. Patient napping with sitter at bedside. 1115 Safety rounds. Patient watching television with  at bedside. 1230 Scheduled meds. Patient sitting on side of bed eating lunch tolerating 6 L of O2.     1330 Safety rounds, patient sitting on side of bed sitter and  present. Provided water pitcher per request.     1430 Safety rounds, patient sleeping soundly, no needs noted at this time. 1630 Safety rounds patient asking about her inj she takes on saturdays for RA. Will report to MD    [phone] Encouraged IS use in the setting of low grade fever, patient tolerated well with very low lung volumes.

## 2018-03-31 NOTE — ROUTINE PROCESS
0720 Bedside and Verbal shift change report given to Mark Lin RN (oncoming nurse) by Leonela Cavazos (offgoing nurse). Report included the following information SBAR, Kardex, Intake/Output, Recent Results and Cardiac Rhythm NSR. Primary Nurse Bhavik Rodriguez, MARY and Vanita Valdivia RN performed a dual skin assessment on this patient No impairment noted  Augustus score is 17    1910 Bedside and Verbal shift change report given to Pru RN (oncoming nurse) by Krystal Dang RN (offgoing nurse). Report included the following information SBAR, Kardex, Intake/Output, MAR, Accordion, Recent Results and Cardiac Rhythm NSR.

## 2018-04-01 LAB
ANION GAP SERPL CALC-SCNC: ABNORMAL MMOL/L (ref 5–15)
BUN SERPL-MCNC: 18 MG/DL (ref 6–20)
BUN/CREAT SERPL: 30 (ref 12–20)
CALCIUM SERPL-MCNC: 8.4 MG/DL (ref 8.5–10.1)
CHLORIDE SERPL-SCNC: 101 MMOL/L (ref 97–108)
CO2 SERPL-SCNC: 44 MMOL/L (ref 21–32)
CREAT SERPL-MCNC: 0.61 MG/DL (ref 0.55–1.02)
ERYTHROCYTE [DISTWIDTH] IN BLOOD BY AUTOMATED COUNT: 15.2 % (ref 11.5–14.5)
GLUCOSE SERPL-MCNC: 104 MG/DL (ref 65–100)
HCT VFR BLD AUTO: 37.8 % (ref 35–47)
HGB BLD-MCNC: 10.9 G/DL (ref 11.5–16)
MAGNESIUM SERPL-MCNC: 2 MG/DL (ref 1.6–2.4)
MCH RBC QN AUTO: 27.6 PG (ref 26–34)
MCHC RBC AUTO-ENTMCNC: 28.8 G/DL (ref 30–36.5)
MCV RBC AUTO: 95.7 FL (ref 80–99)
NRBC # BLD: 0 K/UL (ref 0–0.01)
NRBC BLD-RTO: 0 PER 100 WBC
PHOSPHATE SERPL-MCNC: 4.3 MG/DL (ref 2.6–4.7)
PLATELET # BLD AUTO: 204 K/UL (ref 150–400)
PMV BLD AUTO: 9.6 FL (ref 8.9–12.9)
POTASSIUM SERPL-SCNC: 3.6 MMOL/L (ref 3.5–5.1)
RBC # BLD AUTO: 3.95 M/UL (ref 3.8–5.2)
SODIUM SERPL-SCNC: 143 MMOL/L (ref 136–145)
WBC # BLD AUTO: 6.8 K/UL (ref 3.6–11)

## 2018-04-01 PROCEDURE — 84100 ASSAY OF PHOSPHORUS: CPT | Performed by: INTERNAL MEDICINE

## 2018-04-01 PROCEDURE — 74011250637 HC RX REV CODE- 250/637: Performed by: INTERNAL MEDICINE

## 2018-04-01 PROCEDURE — 77010033678 HC OXYGEN DAILY

## 2018-04-01 PROCEDURE — 74011250636 HC RX REV CODE- 250/636: Performed by: INTERNAL MEDICINE

## 2018-04-01 PROCEDURE — 74011636637 HC RX REV CODE- 636/637: Performed by: INTERNAL MEDICINE

## 2018-04-01 PROCEDURE — 74011000250 HC RX REV CODE- 250: Performed by: INTERNAL MEDICINE

## 2018-04-01 PROCEDURE — 80048 BASIC METABOLIC PNL TOTAL CA: CPT | Performed by: INTERNAL MEDICINE

## 2018-04-01 PROCEDURE — 85027 COMPLETE CBC AUTOMATED: CPT | Performed by: INTERNAL MEDICINE

## 2018-04-01 PROCEDURE — 94640 AIRWAY INHALATION TREATMENT: CPT

## 2018-04-01 PROCEDURE — 65660000000 HC RM CCU STEPDOWN

## 2018-04-01 PROCEDURE — 83735 ASSAY OF MAGNESIUM: CPT | Performed by: INTERNAL MEDICINE

## 2018-04-01 PROCEDURE — 36415 COLL VENOUS BLD VENIPUNCTURE: CPT | Performed by: INTERNAL MEDICINE

## 2018-04-01 RX ADMIN — METOPROLOL SUCCINATE 25 MG: 25 TABLET, EXTENDED RELEASE ORAL at 08:16

## 2018-04-01 RX ADMIN — ENOXAPARIN SODIUM 40 MG: 40 INJECTION SUBCUTANEOUS at 08:17

## 2018-04-01 RX ADMIN — BUDESONIDE 500 MCG: 0.5 INHALANT RESPIRATORY (INHALATION) at 19:47

## 2018-04-01 RX ADMIN — BUDESONIDE 500 MCG: 0.5 INHALANT RESPIRATORY (INHALATION) at 08:37

## 2018-04-01 RX ADMIN — ARFORMOTEROL TARTRATE 15 MCG: 15 SOLUTION RESPIRATORY (INHALATION) at 08:37

## 2018-04-01 RX ADMIN — PREDNISONE 5 MG: 5 TABLET ORAL at 08:16

## 2018-04-01 RX ADMIN — ARFORMOTEROL TARTRATE 15 MCG: 15 SOLUTION RESPIRATORY (INHALATION) at 19:47

## 2018-04-01 RX ADMIN — Medication 10 ML: at 21:09

## 2018-04-01 RX ADMIN — ACETAMINOPHEN 650 MG: 325 TABLET ORAL at 20:08

## 2018-04-01 RX ADMIN — ENOXAPARIN SODIUM 40 MG: 40 INJECTION SUBCUTANEOUS at 21:08

## 2018-04-01 RX ADMIN — FUROSEMIDE 40 MG: 40 TABLET ORAL at 08:16

## 2018-04-01 NOTE — PROGRESS NOTES
Primary Nurse King Bernardino RN and Daron Nunez RN performed a dual skin assessment on this patient No impairment noted  Augustus score is 19

## 2018-04-01 NOTE — PROGRESS NOTES
Chapo Park[address]un [address]  [address] [facility], [address] [place] [address]  [phone]      Medical Progress Note      NAME: Tanya Varela   :  1955  MRM:  [id number]    Date/Time: 2018  7:41 AM           Subjective:     Chief Complaint:  Confusion: resolved, back to baseline, understands importance of respiratory support while asleep, states she will be compliant at discharge once home Trilogy is arranged    ROS: (bold if positive, if negative)       Cough         SOB/FOX        Tolerating Diet        Unable to obtain due to confusion       Objective:       Vitals:          Last 24hrs VS reviewed since prior progress note.  Most recent are:    Visit Vitals    /60 (BP 1 Location: Left arm, BP Patient Position: At rest)    Pulse 64    Temp 98.4 °F (36.9 °C)    Resp 16    Ht 5' 5\" (1.651 m)    Wt 125.4 kg (276 lb 7.3 oz)    SpO2 93%    Breastfeeding No    BMI 46 kg/m2     SpO2 Readings from Last 6 Encounters:   18 93%   17 94%   17 (P) 94%   17 99%   17 95%   17 98%    O2 Flow Rate (L/min): 6 l/min       Intake/Output Summary (Last 24 hours) at 18 0741  Last data filed at 18 0651   Gross per 24 hour   Intake              300 ml   Output             1625 ml   Net            -1325 ml          Exam:     Physical Exam:    Gen:  Well-developed, morbidly obese, in no acute distress  HEENT:  Pink conjunctivae, PERRL, hearing intact to voice, moist mucous membranes  Neck:  Supple, without masses, thyroid non-tender  Resp:  No accessory muscle use, decreased BS RLL but improve with coughing, no wheezing, prolonged expiratory phase  Card:  No murmurs, normal S1, S2 without thrills, bruits or peripheral edema  Abd:  Soft, non-tender, non-distended, normoactive bowel sounds are present, no palpable organomegaly and no detectable hernias  Lymph:  No cervical or inguinal adenopathy  Musc:  No cyanosis or clubbing  Skin:  No rashes or ulcers, skin turgor is good  Neuro:  Cranial nerves are grossly intact, no focal motor weakness, follows commands appropriately  Psych:  Poor insight, oriented to person, place but not time, alert       Telemetry reviewed:   normal sinus rhythm    Medications Reviewed: (see below)    Lab Data Reviewed: (see below)    ______________________________________________________________________    Medications:     Current Facility-Administered Medications   Medication Dose Route Frequency    predniSONE (DELTASONE) tablet 5 mg  5 mg Oral DAILY WITH BREAKFAST    hydrALAZINE (APRESOLINE) 20 mg/mL injection 20 mg  20 mg IntraVENous Q6H PRN    acetaminophen (TYLENOL) tablet 650 mg  650 mg Oral Q4H PRN    furosemide (LASIX) tablet 40 mg  40 mg Oral DAILY    metoprolol succinate (TOPROL-XL) XL tablet 25 mg  25 mg Oral DAILY    sodium chloride (NS) flush 5-10 mL  5-10 mL IntraVENous Q8H    sodium chloride (NS) flush 5-10 mL  5-10 mL IntraVENous PRN    diphenhydrAMINE (BENADRYL) capsule 25 mg  25 mg Oral Q4H PRN    ondansetron (ZOFRAN) injection 4 mg  4 mg IntraVENous Q4H PRN    enoxaparin (LOVENOX) injection 40 mg  40 mg SubCUTAneous Q12H    arformoterol (BROVANA) neb solution 15 mcg  15 mcg Nebulization BID RT    budesonide (PULMICORT) 500 mcg/2 ml nebulizer suspension  500 mcg Nebulization BID RT            Lab Review:     Recent Labs      04/01/18   0331 03/31/18   0326  03/30/18 0417   WBC  6.8  7.6  5.8   HGB  10.9*  10.6*  11.1*   HCT  37.8  36.1  40.9   PLT  204  194  184     Recent Labs      04/01/18   0331 03/31/18   0326  03/30/18   0417  03/29/18   1959   NA  143  143  146*  146*   K  3.6  3.4*  4.9  4.4   CL  101  100  99  98   CO2  44*  45*  >45*  >45*   GLU  104*  93  148*  94   BUN  18  15  10  9   CREA  0.61  0.47*  0.52*  0.43*   CA  8.4*  8.5  9.1  9.1   MG  2.0  1.8  2.1   --    PHOS  4.3  2.8   --    --    ALB   --    --    --   2.9*   TBILI   --    --    --   0.3   SGOT   --    --    --   19 ALT   --    --    --   32     No results found for: [facility]  Recent Labs      03/30/18   0808  03/29/18 2055 03/29/18   1950   PH  7.37  7.22*  7.18*   PCO2  96*  140*  140*   PO2  71*  66*  79*   HCO3  54*  56*  52*   FIO2  55  55  60     No results for input(s): INR in the last 72 hours.     No lab exists for component: INREXT, INREXT  No results found for: SDES  Lab Results   Component Value Date/Time    Culture result: NO GROWTH 2 DAYS 03/29/2018 07:59 PM            Assessment:     Principal Problem:    Acute on chronic respiratory failure with hypoxia and hypercapnia (HCC) (8/5/2017)    Active Problems:    Rheumatoid arthritis involving multiple sites with positive rheumatoid factor ([facility] [address] (10/7/2016)      Essential hypertension (10/7/2016)      Acute encephalopathy (3/4/2017)      Class 3 obesity with serious comorbidity and body mass index (BMI) of 45.0 to 49.9 in [facility]) (8/5/2017)      Pulmonary hypertension (8/5/2017)      Dyslipidemia, goal LDL below 70 (8/5/2017)      Coronary artery disease involving native heart (8/5/2017)           Plan:     Principal Problem:    Acute on chronic respiratory failure with hypoxia and hypercapnia (HCC) (8/5/2017)   - doing much better overall, tolerating being off BiPAP while awake for extended periods   - continue BiPAP   - Pulmonary input appreciated   - still needs home respiratory support arranged   - admission due to non-compliance   - start PT/OT    Active Problems:    Rheumatoid arthritis involving multiple sites with positive rheumatoid factor ([facility] [address] (10/7/2016)   - continue oral prednisone      Essential hypertension (10/7/2016)   - BP okay, follow on home meds      Acute encephalopathy (3/4/2017)   - POA, due to hypercapnea, clearing       Class 3 obesity with serious comorbidity and body mass index (BMI) of 45.0 to 49.9 in [facility]) (8/5/2017)   - counseled on weight loss       Pulmonary hypertension (8/5/2017)   - likely due to obesity/hypoventilation      Dyslipidemia, goal LDL below 70 (8/5/2017)   - not on statin, outpatient follow up      Coronary artery disease involving native heart (8/5/2017)   - stable, continue cardiac meds      Total time spent with patient: 25 minutes                  Care Plan discussed with: Patient and Nursing Staff    Discussed:  Code Status, Care Plan and D/C Planning    Prophylaxis:  Lovenox    Disposition:   PT, OT, RN           ___________________________________________________    Attending Physician: Lois Youngblood MD

## 2018-04-01 NOTE — PROGRESS NOTES
TRANSFER - OUT REPORT:    Verbal report given to [employer] RN(name) on Gissell Li  being transferred to [facility] for routine progression of care       Report consisted of patients Situation, Background, Assessment and   Recommendations(SBAR). Information from the following report(s) SBAR, Kardex, Procedure Summary, Intake/Output, MAR, Accordion, Recent Results, Med Rec Status and Cardiac Rhythm sinus rhythm was reviewed with the receiving nurse. Lines:   Peripheral IV 03/29/18 Right Antecubital (Active)   Site Assessment Clean, dry, & intact 4/1/2018  8:08 AM   Phlebitis Assessment 0 4/1/2018  8:08 AM   Infiltration Assessment 0 4/1/2018  8:08 AM   Dressing Status Occlusive 4/1/2018  8:08 AM   Dressing Type Transparent 4/1/2018  8:08 AM   Hub Color/Line Status Pink 4/1/2018  8:08 AM   Action Taken Open ports on tubing capped 4/1/2018  8:08 AM   Alcohol Cap Used Yes 4/1/2018  8:08 AM        Opportunity for questions and clarification was provided.       Patient transported with:   Monitor  O2 @ 6 liters  Tech

## 2018-04-01 NOTE — PROGRESS NOTES
Bedside and Verbal shift change report given to Rogelio Colunga (oncoming nurse) by Rae Donato RN (offgoing nurse). Report included the following information SBAR, Kardex, Procedure Summary, Intake/Output, MAR, Accordion, Recent Results, Med Rec Status and Cardiac Rhythm sinus rhythm . 8:30 AM  Patient wanted to use Bipap after getting back into bed, she complained of feeling short of breath.

## 2018-04-01 NOTE — PROGRESS NOTES
Bedside and Verbal shift change report given to Jeny-RN (oncoming nurse) by Rosalina-RN (offgoing nurse). Report included the following information SBAR, Kardex, MAR and Recent Results.

## 2018-04-01 NOTE — PROGRESS NOTES
Problem: Falls - Risk of  Goal: *Absence of Falls  Document Idania Fall Risk and appropriate interventions in the flowsheet.    Outcome: Progressing Towards Goal  Fall Risk Interventions:  Mobility Interventions: Assess mobility with egress test, Communicate number of staff needed for ambulation/transfer, Patient to call before getting OOB    Mentation Interventions: Adequate sleep, hydration, pain control, Bed/chair exit alarm, Door open when patient unattended, Evaluate medications/consider consulting pharmacy    Medication Interventions: Assess postural VS orthostatic hypotension, Bed/chair exit alarm, Evaluate medications/consider consulting pharmacy, Patient to call before getting OOB    Elimination Interventions: Call light in reach, Toileting schedule/hourly rounds    History of Falls Interventions: Bed/chair exit alarm, Consult care management for discharge planning, Door open when patient unattended, Evaluate medications/consider consulting pharmacy, Room close to nurse's station

## 2018-04-02 LAB
ANION GAP SERPL CALC-SCNC: ABNORMAL MMOL/L (ref 5–15)
BUN SERPL-MCNC: 18 MG/DL (ref 6–20)
BUN/CREAT SERPL: 31 (ref 12–20)
CALCIUM SERPL-MCNC: 8.4 MG/DL (ref 8.5–10.1)
CHLORIDE SERPL-SCNC: 101 MMOL/L (ref 97–108)
CHOLEST SERPL-MCNC: 230 MG/DL
CO2 SERPL-SCNC: 41 MMOL/L (ref 21–32)
CREAT SERPL-MCNC: 0.58 MG/DL (ref 0.55–1.02)
ERYTHROCYTE [DISTWIDTH] IN BLOOD BY AUTOMATED COUNT: 15.3 % (ref 11.5–14.5)
GLUCOSE SERPL-MCNC: 94 MG/DL (ref 65–100)
HCT VFR BLD AUTO: 38.9 % (ref 35–47)
HDLC SERPL-MCNC: 67 MG/DL
HDLC SERPL: 3.4 {RATIO} (ref 0–5)
HGB BLD-MCNC: 11.4 G/DL (ref 11.5–16)
LDLC SERPL CALC-MCNC: 143.4 MG/DL (ref 0–100)
LIPID PROFILE,FLP: ABNORMAL
MCH RBC QN AUTO: 27.9 PG (ref 26–34)
MCHC RBC AUTO-ENTMCNC: 29.3 G/DL (ref 30–36.5)
MCV RBC AUTO: 95.1 FL (ref 80–99)
NRBC # BLD: 0 K/UL (ref 0–0.01)
NRBC BLD-RTO: 0 PER 100 WBC
PLATELET # BLD AUTO: 192 K/UL (ref 150–400)
PMV BLD AUTO: 9.6 FL (ref 8.9–12.9)
POTASSIUM SERPL-SCNC: 3.6 MMOL/L (ref 3.5–5.1)
RBC # BLD AUTO: 4.09 M/UL (ref 3.8–5.2)
SODIUM SERPL-SCNC: 141 MMOL/L (ref 136–145)
TRIGL SERPL-MCNC: 98 MG/DL (ref ?–150)
VLDLC SERPL CALC-MCNC: 19.6 MG/DL
WBC # BLD AUTO: 5.9 K/UL (ref 3.6–11)

## 2018-04-02 PROCEDURE — 65660000000 HC RM CCU STEPDOWN

## 2018-04-02 PROCEDURE — 80048 BASIC METABOLIC PNL TOTAL CA: CPT | Performed by: INTERNAL MEDICINE

## 2018-04-02 PROCEDURE — 94640 AIRWAY INHALATION TREATMENT: CPT

## 2018-04-02 PROCEDURE — 97116 GAIT TRAINING THERAPY: CPT

## 2018-04-02 PROCEDURE — 85027 COMPLETE CBC AUTOMATED: CPT | Performed by: INTERNAL MEDICINE

## 2018-04-02 PROCEDURE — 36415 COLL VENOUS BLD VENIPUNCTURE: CPT | Performed by: INTERNAL MEDICINE

## 2018-04-02 PROCEDURE — 97161 PT EVAL LOW COMPLEX 20 MIN: CPT

## 2018-04-02 PROCEDURE — 97165 OT EVAL LOW COMPLEX 30 MIN: CPT

## 2018-04-02 PROCEDURE — 89055 LEUKOCYTE ASSESSMENT FECAL: CPT | Performed by: INTERNAL MEDICINE

## 2018-04-02 PROCEDURE — 74011250637 HC RX REV CODE- 250/637: Performed by: INTERNAL MEDICINE

## 2018-04-02 PROCEDURE — 77010033678 HC OXYGEN DAILY

## 2018-04-02 PROCEDURE — 74011250636 HC RX REV CODE- 250/636: Performed by: INTERNAL MEDICINE

## 2018-04-02 PROCEDURE — 94660 CPAP INITIATION&MGMT: CPT

## 2018-04-02 PROCEDURE — 97535 SELF CARE MNGMENT TRAINING: CPT

## 2018-04-02 PROCEDURE — 87045 FECES CULTURE AEROBIC BACT: CPT | Performed by: INTERNAL MEDICINE

## 2018-04-02 PROCEDURE — 87177 OVA AND PARASITES SMEARS: CPT | Performed by: INTERNAL MEDICINE

## 2018-04-02 PROCEDURE — 80061 LIPID PANEL: CPT | Performed by: INTERNAL MEDICINE

## 2018-04-02 PROCEDURE — 74011636637 HC RX REV CODE- 636/637: Performed by: INTERNAL MEDICINE

## 2018-04-02 PROCEDURE — 74011000250 HC RX REV CODE- 250: Performed by: INTERNAL MEDICINE

## 2018-04-02 RX ORDER — ATORVASTATIN CALCIUM 20 MG/1
20 TABLET, FILM COATED ORAL
Status: DISCONTINUED | OUTPATIENT
Start: 2018-04-02 | End: 2018-04-03 | Stop reason: HOSPADM

## 2018-04-02 RX ORDER — DIPHENOXYLATE HCL/ATROPINE 2.5-.025/5
5 LIQUID (ML) ORAL
Status: DISCONTINUED | OUTPATIENT
Start: 2018-04-02 | End: 2018-04-02

## 2018-04-02 RX ADMIN — ARFORMOTEROL TARTRATE 15 MCG: 15 SOLUTION RESPIRATORY (INHALATION) at 07:06

## 2018-04-02 RX ADMIN — ENOXAPARIN SODIUM 40 MG: 40 INJECTION SUBCUTANEOUS at 08:42

## 2018-04-02 RX ADMIN — FUROSEMIDE 40 MG: 40 TABLET ORAL at 08:42

## 2018-04-02 RX ADMIN — ACETAMINOPHEN 650 MG: 325 TABLET ORAL at 20:39

## 2018-04-02 RX ADMIN — PREDNISONE 5 MG: 5 TABLET ORAL at 08:42

## 2018-04-02 RX ADMIN — ARFORMOTEROL TARTRATE 15 MCG: 15 SOLUTION RESPIRATORY (INHALATION) at 21:34

## 2018-04-02 RX ADMIN — METOPROLOL SUCCINATE 25 MG: 25 TABLET, EXTENDED RELEASE ORAL at 08:42

## 2018-04-02 RX ADMIN — BUDESONIDE 500 MCG: 0.5 INHALANT RESPIRATORY (INHALATION) at 07:06

## 2018-04-02 RX ADMIN — BUDESONIDE 500 MCG: 0.5 INHALANT RESPIRATORY (INHALATION) at 21:34

## 2018-04-02 RX ADMIN — Medication 10 ML: at 05:07

## 2018-04-02 RX ADMIN — ENOXAPARIN SODIUM 40 MG: 40 INJECTION SUBCUTANEOUS at 20:39

## 2018-04-02 RX ADMIN — ACETAMINOPHEN 650 MG: 325 TABLET ORAL at 02:00

## 2018-04-02 RX ADMIN — Medication 10 ML: at 21:38

## 2018-04-02 NOTE — PROGRESS NOTES
1830  Stool culture, WBC, and ova and parasite set to lab. Bedside and Verbal shift change report given to Jaylene (oncoming nurse) by Randall Carranza RN (offgoing nurse). Report included the following information SBAR, Kardex, Intake/Output, MAR, Recent Results and Cardiac Rhythm NSR.

## 2018-04-02 NOTE — PROGRESS NOTES
0710 - Bedside and Verbal shift change report given to Mauricio Almaraz (oncoming nurse) by Sarah Jay (offgoing nurse). Report included the following information SBAR, Kardex, Intake/Output, Accordion, Recent Results and Cardiac Rhythm NSR PVCs. 36 - Dr. Chad Colón notified of pt request for anti-diarrheal medicine as she feels she's going too frequently. Stools are formed. No diarrhea noticed so far this AM. Order placed by MD for Lomotil PRN. 1220 - Per tech, pt had another BM which was loose. Pt also has had mild temps all morning and was concerned of infection as she is taking Oriencia for RA. Dr. Chad Colón notified of concern. 6010 - Report given to Wendy Adkins RN. Relinquished care at this time.

## 2018-04-02 NOTE — PROGRESS NOTES
Charge nurse chart review. 1442:  Per Infection Control, pt does not meet C. Diff criteria. Removed lab order and contact order per Dr. Charlotte Joseph.

## 2018-04-02 NOTE — PROGRESS NOTES
Problem: Falls - Risk of  Goal: *Absence of Falls  Document Idania Fall Risk and appropriate interventions in the flowsheet. Outcome: Progressing Towards Goal  Fall Risk Interventions:  Mobility Interventions: Assess mobility with egress test, Bed/chair exit alarm, Communicate number of staff needed for ambulation/transfer, Mechanical lift, OT consult for ADLs, Patient to call before getting OOB, PT Consult for assist device competence, PT Consult for mobility concerns, Utilize walker, cane, or other assitive device    Mentation Interventions: Adequate sleep, hydration, pain control    Medication Interventions: Bed/chair exit alarm, Evaluate medications/consider consulting pharmacy, Patient to call before getting OOB, Teach patient to arise slowly    Elimination Interventions: Call light in reach, Bed/chair exit alarm, Elevated toilet seat, Patient to call for help with toileting needs, Toilet paper/wipes in reach, Toileting schedule/hourly rounds. ..    1900- Bedside and Verbal shift change report given to [facility]  (oncoming nurse) by Michelle Rosenbaum RN  (offgoing nurse). Report included the following information SBAR, Kardex and Recent Results. ...     0700- Bedside and Verbal shift change report given to 5151  [facility]  (oncoming nurse) by Zaki Greco RN  (offgoing nurse). Report included the following information SBAR, Kardex and Recent Results. ..

## 2018-04-02 NOTE — PROGRESS NOTES
4:01 PM  Trilogy paperwork faxed to [facility], contact is Gio Pond her phone number is  [phone]. Thanks MARTHA Stauffer       Consult noted for Trilogy. Paperwork is on the pt's chart at the nurses station.  Thanks MARTHA Stauffer

## 2018-04-02 NOTE — PROGRESS NOTES
[address]  [address]  [address] [address] [address]  [phone]      PROGRESS NOTE      Name: Ron Anaya MRN: [id number]   : 1955 Hospital: [facility]  [place]    Date: 2018  Admission Date: 3/29/2018     Chart and notes reviewed. Data reviewed. I have evaluated and examined the patient. ROS:  Feeling much better today. Much more clear. Awake and alert. She has some SOB on exertion, but improved. No wheezing or cough. Walked with PT today and doing well. Still needing about 4-5L NC with exertion. She is eating lunch now. Understands that she needs to use Trilogy nightly now. Overnight events reviewed:   Used NIV overnight  Afebrile   BP and HR stable  Oxygen saturations 93% 4L NC  WBC 5.9  HgB 11.4  Na 141  K 3.6  CO2 41 (trending down)    Imaging:    CXR 3/29/18: diminished lung volumes; increased bibasilar airspace disease may represent atelectasis or pneumonia. Vital Signs:  Visit Vitals    /81    Pulse 78    Temp 99.6 °F (37.6 °C)    Resp 24    Ht 5' 5\" (1.651 m)    Wt 126.5 kg (278 lb 14.1 oz)    LMP 2013    SpO2 93%    Breastfeeding No    BMI 46.41 kg/m2       O2 Device: Nasal cannula, Humidifier   O2 Flow Rate (L/min): 4 l/min   Temp (24hrs), Av °F (37.2 °C), Min:98.2 °F (36.8 °C), Max:99.6 °F (37.6 °C)       Intake/Output:   Last shift:       07 -  1900  In: 120 [P.O.:120]  Out: 900 [Urine:900]  Last 3 shifts: 1901 -  0700  In: 1100 [P.O.:1100]  Out: 0455 [Urine:2875]    Intake/Output Summary (Last 24 hours) at 18 1318  Last data filed at 18 1208   Gross per 24 hour   Intake              560 ml   Output             2350 ml   Net            -1790 ml        Telemetry:     Physical Exam:   General:  Alert, cooperative, morbidly obese; wearing BiPAP, no distress. Head:  Normocephalic, without obvious abnormality, atraumatic. Eyes:  Conjunctivae/corneas clear.  PERRL, EOMs intact. Nose: Nares normal.   Throat: Lips, mucosa, and tongue normal.   Neck: Thick. Lungs:   Decreased sounds bilaterally but no wheezing. Chest wall:  No tenderness or deformity. Heart:  Regular rate and rhythm, S1, S2 normal, no murmur, click, rub or gallop. Abdomen:   Soft, non-tender. Bowel sounds normal.   Extremities: Extremities normal, atraumatic; trace BLE edema. Skin: Warm, dry. Neurologic: Grossly nonfocal.     DATA:  MAR reviewed and pertinent medications noted or modified as needed    Labs:  Recent Labs      04/02/18 0337 04/01/18 0331 03/31/18   0326   WBC  5.9  6.8  7.6   HGB  11.4*  10.9*  10.6*   HCT  38.9  37.8  36.1   PLT  192  204  194     Recent Labs      04/02/18 0337 04/01/18 0331 03/31/18   0326   NA  141  143  143   K  3.6  3.6  3.4*   CL  101  101  100   CO2  41*  44*  45*   GLU  94  104*  93   BUN  18  18  15   CREA  0.58  0.61  0.47*   CA  8.4*  8.4*  8.5   MG   --   2.0  1.8   PHOS   --   4.3  2.8       Imaging:  I have personally reviewed the patients radiographs and reports.       IMPRESSION:   · Acute/chronic hypoxic hypercapnic respiratory failure  · OHS  · Atelectasis  · Chronic hypercapnia/CO2 retention--ABG better today  · Restrictive lung disease  · AMS  · Dyspnea  · HTN  · Hx of RA  · Morbid obesity      PLAN:   - Maintain oxygen saturations > 90%  - Continue NIV at night and PRN during the day  · Brovana/Pulmicort nebs in substitution for home Advair  · Will order for home NIV/Trilogy for chronic respiratory failure today  · Home prednisone for RA; no need for additional steroids at this time from a pulmonary standpoint  · Continue home Lasix and metoprolol per primary team  · Appreciate palliative care consult  · PT/OT  · DVT prophylaxis: Lovenox  · GI Prophylaxis: not indicated  · Already has follow up scheduled with Dr. Christiano Merlos on 4/20/18       ABG 7.18/140/79/52/91 at admission (3/29 1950); improvement on NIV (3/30 0800) with ABG 7. 368/96.4/70.7/54.2    A noninvasive ventilator is being ordered for the patient because CPAP and BiPAP will not provide the necessary ventilatory support or settings needed to treat this patient who has respiratory failure caused by COPD.          Lovely Trevizo, NP

## 2018-04-02 NOTE — PROGRESS NOTES
Chapo Dominguez [facility]  [address]  [phone]      Medical Progress Note      NAME: Shakir Farah   :  1955  MRM:  [id number]    Date/Time: 2018          Subjective:     Chief Complaint:  F/u resp failure    Chart/notes/labs/studies reviewed, patient examined at bedside. Feels a little better. +SOB. No CP. No fevers. Having loose stools          Objective:       Vitals:          Last 24hrs VS reviewed since prior progress note. Most recent are:    Visit Vitals    /81    Pulse 78    Temp 99.6 °F (37.6 °C)    Resp 24    Ht 5' 5\" (1.651 m)    Wt 126.5 kg (278 lb 14.1 oz)    SpO2 93%    Breastfeeding No    BMI 46.41 kg/m2     SpO2 Readings from Last 6 Encounters:   18 93%   17 94%   17 (P) 94%   17 99%   17 95%   17 98%    O2 Flow Rate (L/min): 4 l/min     Intake/Output Summary (Last 24 hours) at 18 1348  Last data filed at 18 1322   Gross per 24 hour   Intake              680 ml   Output             2350 ml   Net            -1670 ml          Exam:     Physical Exam:    Gen: morbidly obese. Well-developed, well-nourished, in no acute distress  HEENT:  Sclerae nonicteric, hearing intact to voice, mucous membranes moist  Neck:  Supple, without masses. Resp:  No accessory muscle use, diminished breath sounds without wheezes, rales, or rhonchi  Card: RRR, without m/r/g. No LE edema. Abd:  +bowel sounds, soft, NTTP, obese  Neuro: Face symmetric, tongue midline, speech fluent, follows commands appropriately  Psych:  Alert, oriented x 3.  Good insight     Medications Reviewed: (see below)    Lab Data Reviewed: (see below)    ______________________________________________________________________    Medications:     Current Facility-Administered Medications   Medication Dose Route Frequency    atorvastatin (LIPITOR) tablet 20 mg  20 mg Oral QHS    predniSONE (DELTASONE) tablet 5 mg  5 mg Oral DAILY WITH BREAKFAST    hydrALAZINE (APRESOLINE) 20 mg/mL injection 20 mg  20 mg IntraVENous Q6H PRN    acetaminophen (TYLENOL) tablet 650 mg  650 mg Oral Q4H PRN    furosemide (LASIX) tablet 40 mg  40 mg Oral DAILY    metoprolol succinate (TOPROL-XL) XL tablet 25 mg  25 mg Oral DAILY    sodium chloride (NS) flush 5-10 mL  5-10 mL IntraVENous Q8H    sodium chloride (NS) flush 5-10 mL  5-10 mL IntraVENous PRN    diphenhydrAMINE (BENADRYL) capsule 25 mg  25 mg Oral Q4H PRN    ondansetron (ZOFRAN) injection 4 mg  4 mg IntraVENous Q4H PRN    enoxaparin (LOVENOX) injection 40 mg  40 mg SubCUTAneous Q12H    arformoterol (BROVANA) neb solution 15 mcg  15 mcg Nebulization BID RT    budesonide (PULMICORT) 500 mcg/2 ml nebulizer suspension  500 mcg Nebulization BID RT            Lab Review:     Recent Labs      04/02/18 0337 04/01/18 0331 03/31/18   0326   WBC  5.9  6.8  7.6   HGB  11.4*  10.9*  10.6*   HCT  38.9  37.8  36.1   PLT  192  204  194     Recent Labs      04/02/18 0337 04/01/18 0331 03/31/18   0326   NA  141  143  143   K  3.6  3.6  3.4*   CL  101  101  100   CO2  41*  44*  45*   GLU  94  104*  93   BUN  18  18  15   CREA  0.58  0.61  0.47*   CA  8.4*  8.4*  8.5   MG   --   2.0  1.8   PHOS   --   4.3  2.8     No components found for: GLPOC  No results for input(s): PH, PCO2, PO2, HCO3, FIO2 in the last 72 hours. No results for input(s): INR in the last 72 hours. No lab exists for component: INREXT  No results found for: NURIA  Lab Results   Component Value Date/Time    Culture result: NO GROWTH 4 DAYS 03/29/2018 07:59 PM            Assessment / Plan:       Acute on chronic respiratory failure with hypoxia and hypercapnia: admission due to non-compliance and removal of Trilogy from home. Improving, tolerating being off BiPAP while awake for extended periods. Continue BiPAP when sleeping and PRN. Pulmonology following and appreciate assistance in arranging Trilogy at home with CM. Continue LABA/ICS. PT / OT to see       Rheumatoid arthritis involving multiple sites with positive rheumatoid factor: on immunosuppressive / DMARDs at home. Continue prednisone      Loose stools / diarrhea: with low grade temps. Will check C. Diff and stool studies given immunosuppression      Essential hypertension (): BP controlled, continue metoprolol; on Lasix      Acute encephalopathy: due to hypercapnia. Back to baseline. Class 3 obesity with serious comorbidity and body mass index (BMI) of 45.0 to 49.9 in adult [facility]): cont supplemental O2 to keep SpO2 >90      Pulmonary hypertension: likely from OHS and restrictive lung disease. Mgmt as above      Dyslipidemia, goal LDL below 70: , HDL 67, TG 98. Start atorvastatin      Coronary artery disease involving native heart: not on ASA; unclear why, will have to clarify.  Continue statin and BB      Total time spent with patient: 35 minutes                  Care Plan discussed with: Patient, Nursing Staff and >50% of time spent in counseling and coordination of care    Discussed:  Care Plan and D/C Planning    Prophylaxis:  Lovenox    Disposition:  Home with family           ___________________________________________________    Attending Physician: Alphia Nissen, MD

## 2018-04-02 NOTE — PROGRESS NOTES
Problem: Mobility Impaired (Adult and Pediatric)  Goal: *Acute Goals and Plan of Care (Insert Text)  physical Therapy EVALUATION/DISCHARGE  Patient: Alfred Arias (22 y.o. female)  Date: 4/2/2018  Primary Diagnosis: Acute on chronic respiratory failure with hypoxia and hypercapnia (HCC)        Precautions:      ASSESSMENT :  Based on the objective data described below, the patient presents with admission due to Ac Resp Failure with hypoxia and hypercapnia. Elevated CO2 levels with AMS resolved at this time. Received sitting up in recliner on 4LO2 with sats in low 90's. Pt is up ad liliana in room without need of asst device. Gait of 100' desaturating to 86% with HR of 87. Pt required 6L to achieve 91% O2 sats. No noted SOB. Placed in chair back on 4LO2 with sats in low 90's. Functionally pt is independent. Respiratory Therapy can perform 6MWT if needed prior to discharge since pt is independent and safe. .  Otherwise,. ... Skilled physical therapy is not indicated at this time. PLAN :  Discharge Recommendations: Outpatient Pulmonary rehab  Further Equipment Recommendations for Discharge: none     SUBJECTIVE:   Patient stated I am feeling much better now.     OBJECTIVE DATA SUMMARY:   HISTORY:    Past Medical History:   Diagnosis Date    Hypertension     RA (rheumatoid arthritis) ([address]      Past Surgical History:   Procedure Laterality Date    HX GYN      R vulvectomy     Prior Level of Function/Home Situation: lives with ; independent and working  Personal factors and/or comorbidities impacting plan of care:  Ac Resp Failure; RA; AMS    Home Situation  Home Environment: Private residence  # Steps to Enter: 1  One/Two Story Residence: Two story  Lift Chair Available: Yes (for stairs)  Living Alone: No  Support Systems: Spouse/Significant Other/Partner  Patient Expects to be Discharged to[de-identified] Private residence  Current DME Used/Available at Home: Oxygen, portable, Grab bars  Tub or Shower Type: Tub/Shower combination    EXAMINATION/PRESENTATION/DECISION MAKING:   Critical Behavior:  Neurologic State: Alert  Orientation Level: Oriented X4  Cognition: Follows commands  Safety/Judgement: Awareness of environment, Fall prevention, Insight into deficits  Hearing: Auditory  Auditory Impairment: None  Skin:  IV  Edema: WFL  Range Of Motion:  AROM: Within functional limits                       Strength:    Strength: Within functional limits                    Tone & Sensation:   Tone: Normal              Sensation: Intact               Coordination:  Coordination: Generally decreased, functional  Vision:      Functional Mobility:  Bed Mobility:  Rolling: Independent  Supine to Sit: Independent  Sit to Supine: Independent  Scooting: Independent  Transfers:  Sit to Stand: Independent  Stand to Sit: Independent                       Balance:   Sitting: Intact  Standing: Intact  Ambulation/Gait Training:  Distance (ft): 100 Feet (ft)  Assistive Device: Gait belt  Ambulation - Level of Assistance: Independent                                                   Functional Measure:  Barthel Index:    Bathin  Bladder: 10  Bowels: 10  Groomin  Dressing: 10  Feeding: 10  Mobility: 15  Stairs: 0  Toilet Use: 10  Transfer (Bed to Chair and Back): 15  Total: 85       Barthel and G-code impairment scale:  Percentage of impairment CH  0% CI  1-19% CJ  20-39% CK  40-59% CL  60-79% CM  80-99% CN  100%   Barthel Score 0-100 100 99-80 79-60 59-40 20-39 1-19   0   Barthel Score 0-20 20 17-19 13-16 9-12 5-8 1-4 0      The Barthel ADL Index: Guidelines  1. The index should be used as a record of what a patient does, not as a record of what a patient could do. 2. The main aim is to establish degree of independence from any help, physical or verbal, however minor and for whatever reason. 3. The need for supervision renders the patient not independent. 4. A patient's performance should be established using the best available evidence. Asking the patient, friends/relatives and nurses are the usual sources, but direct observation and common sense are also important. However direct testing is not needed. 5. Usually the patient's performance over the preceding 24-48 hours is important, but occasionally longer periods will be relevant. 6. Middle categories imply that the patient supplies over 50 per cent of the effort. 7. Use of aids to be independent is allowed. Karis Walton., Barthel, D.W. ([id number]). Functional evaluation: the Barthel Index. [facility] (14)2. Jenny Pritchard vandana REJI Garcia, Xuan Mcpherson., Myriam Little., [place], [facility] (1999). Measuring the change indisability after inpatient rehabilitation; comparison of the responsiveness of the Barthel Index and Functional [place] Measure. Journal of Neurology, Neurosurgery, and Psychiatry, 66(4), 647-348. ADRIAN Velasco, BIB Sosa, & Juan Bence, MJuan RamonA. (2004.) Assessment of post-stroke quality of life in cost-effectiveness studies: The usefulness of the Barthel Index and the EuroQoL-5D. Quality of Life Research, 13, 235-82       G codes: In compliance with CMSs Claims Based Outcome Reporting, the following G-code set was chosen for this patient based on their primary functional limitation being treated: The outcome measure chosen to determine the severity of the functional limitation was the barthel with a score of 85/100 which was correlated with the impairment scale.     ? Mobility - Walking and Moving Around:     - CURRENT STATUS: CI - 1%-19% impaired, limited or restricted    - GOAL STATUS: CI - 1%-19% impaired, limited or restricted    - D/C STATUS:  CI - 1%-19% impaired, limited or restricted        Physical Therapy Evaluation Charge Determination   History Examination Presentation Decision-Making   HIGH Complexity :3+ comorbidities / personal factors will impact the outcome/ POC  LOW Complexity : 1-2 Standardized tests and measures addressing body structure, function, activity limitation and / or participation in recreation  LOW Complexity : Stable, uncomplicated  Other outcome measures barthel  LOW       Based on the above components, the patient evaluation is determined to be of the following complexity level: LOW     Pain:  Pain Scale 1: Numeric (0 - 10)  Pain Intensity 1: 0     Activity Tolerance:   fair  Please refer to the flowsheet for vital signs taken during this treatment. After treatment:   [x]   Patient left in no apparent distress sitting up in chair  []   Patient left in no apparent distress in bed  [x]   Call bell left within reach  [x]   Nursing notified  [x]   Caregiver present  []   Bed alarm activated    COMMUNICATION/EDUCATION:   Communication/Collaboration:  [x]   Fall prevention education was provided and the patient/caregiver indicated understanding. [x]   Patient/family have participated as able and agree with findings and recommendations. []   Patient is unable to participate in plan of care at this time.   Findings and recommendations were discussed with: Registered Nurse    Thank you for this referral.  Harvey Harris, PT   Time Calculation: 20 mins

## 2018-04-02 NOTE — PROGRESS NOTES
Occupational Therapy EVALUATION/discharge  Patient: Laverne Boateng (19 y.o. female)  Date: 4/2/2018  Primary Diagnosis: Acute on chronic respiratory failure with hypoxia and hypercapnia (HCC)        Precautions: universal       ASSESSMENT:   Based on the objective data described below, the patient presents with good overall activity tolerance following admission on 3/29 for acute on chronic respiratory failure. Patient with AMS on admission and noted to have CO2 retention. Today, she was alert, oriented x4, and following 100% of commands. Patient reports she lives with her , was independent with all ADLs, working as a book keeper full time (4 days/wk), and on 4L of home O2. Patient was independent/mod I for completion of all ADLs and functional mobility. Patient educated on adaptive ADL techniques and energy conservation techniques. Patient has no further skilled OT needs and is cleared from OT perspective. Patient encouraged to be OOB to chair at minimum of 3x/day and complete simple exercises as often as tolerated to maintain independence while at the hospital.      Discharge Recommendations: Outpatient pulmonary rehab- patient requesting   Further Equipment Recommendations for Discharge: none       SUBJECTIVE:   Patient stated [place] will stay up as much as I can.     OBJECTIVE DATA SUMMARY:   HISTORY:   Past Medical History:   Diagnosis Date    Hypertension     RA (rheumatoid arthritis) ([address]      Past Surgical History:   Procedure Laterality Date    HX GYN      R vulvectomy       Prior Level of Function/Environment/Context: Patient lives with her . See assessment section for PLOF information.       Home Situation  Home Environment: Private residence  # Steps to Enter: 1  One/Two Story Residence: Two story  Lift Chair Available: Yes  Living Alone: No  Support Systems: Spouse/Significant Other/Partner  Patient Expects to be Discharged to[de-identified] Private residence  Current DME Used/Available at Home: Oxygen, portable, Grab bars (4L O2 at all times)  Tub or Shower Type: Tub/Shower combination  [x]  Right hand dominant   []  Left hand dominant    EXAMINATION OF PERFORMANCE DEFICITS:  Cognitive/Behavioral Status:  Neurologic State: Alert  Orientation Level: Oriented X4  Cognition: Follows commands  Perception: Appears intact  Perseveration: No perseveration noted  Safety/Judgement: Awareness of environment    Skin: Intact in the uppers    Edema: None noted in the uppers    Hearing: Auditory  Auditory Impairment: None    Vision/Perceptual:    Tracking: Able to track stimulus in all quadrants w/o difficulty    Diplopia: No    Acuity: Within Defined Limits    Corrective Lenses: Glasses    Range of Motion:  WDL in the uppers    Strength:  WDL in the uppers    Coordination:  Fine Motor Skills-Upper: Left Intact; Right Intact    Gross Motor Skills-Upper: Left Intact; Right Intact    Tone & Sensation:  Tone: Normal  Sensation: Intact    Balance:  Sitting: Intact  Standing: Intact    Functional Mobility and Transfers for ADLs:  Bed Mobility:  Rolling: Independent  Supine to Sit: Independent  Sit to Supine:  (pt remained up at end of tx )  Scooting: Independent    Transfers:  Sit to Stand: Independent  Stand to Sit: Independent  Bed to Chair: Independent  Toilet Transfer : Independent    ADL Assessment:  Feeding: Independent    Oral Facial Hygiene/Grooming: Independent    Bathing: Independent    Upper Body Dressing: Independent    Lower Body Dressing: Independent    Toileting: Modified independence; Education provided for safe weight shifting for seated hygiene to conserve energy and ensure adequate reach; Patient able to reach but reports previously being limited only at the hospital d/t IV and multiple lines interfering with access     Provided verbal cuing for education for breathing techniques including pursed lip breathing techniques (PLB) and circulatory breathing.   Additionally, patient was educated on energy conservation techniques, including how they specifically apply to functional activities; This includes pacing, rest breaks, and planning. Patient with good verbal understanding however needing additional cuing through out tasks to use techniques. Additional time needed during tasks. Cognitive Retraining  Safety/Judgement: Awareness of environment    Functional Measure:  Barthel Index:    Bathin  Bladder: 10  Bowels: 10  Groomin  Dressing: 10  Feeding: 10  Mobility: 15  Stairs: 0  Toilet Use: 10  Transfer (Bed to Chair and Back): 15  Total: 90       Barthel and G-code impairment scale:  Percentage of impairment CH  0% CI  1-19% CJ  20-39% CK  40-59% CL  60-79% CM  80-99% CN  100%   Barthel Score 0-100 100 99-80 79-60 59-40 20-39 1-19   0   Barthel Score 0-20 20 17-19 13-16 9-12 5-8 1-4 0      The Barthel ADL Index: Guidelines  1. The index should be used as a record of what a patient does, not as a record of what a patient could do. 2. The main aim is to establish degree of independence from any help, physical or verbal, however minor and for whatever reason. 3. The need for supervision renders the patient not independent. 4. A patient's performance should be established using the best available evidence. Asking the patient, friends/relatives and nurses are the usual sources, but direct observation and common sense are also important. However direct testing is not needed. 5. Usually the patient's performance over the preceding 24-48 hours is important, but occasionally longer periods will be relevant. 6. Middle categories imply that the patient supplies over 50 per cent of the effort. 7. Use of aids to be independent is allowed. Yvonne Oh., Barthel, D.W. (8953). Functional evaluation: the Barthel Index. [facility] (14)2. REJI Ray, Paco Montiel., Richie Esparza, Mike, [address] [place] [address] ().  Measuring the change indisability after inpatient rehabilitation; comparison of the responsiveness of the Barthel Index and Functional [place] Measure. Journal of Neurology, Neurosurgery, and Psychiatry, 66(4), 277-693. ADRIAN Brown, BIB Sosa, & Suzan Medeiros M.A. (2004.) Assessment of post-stroke quality of life in cost-effectiveness studies: The usefulness of the Barthel Index and the EuroQoL-5D. Quality of Life Research, 13, 934-38       G codes: In compliance with CMSs Claims Based Outcome Reporting, the following G-code set was chosen for this patient based on their primary functional limitation being treated: The outcome measure chosen to determine the severity of the functional limitation was the Barthel Index with a score of 90/100 which was correlated with the impairment scale. ? Self Care:     - CURRENT STATUS: CI - 1%-19% impaired, limited or restricted    - GOAL STATUS: CI - 1%-19% impaired, limited or restricted    - D/C STATUS:  CI - 1%-19% impaired, limited or restricted     Occupational Therapy Evaluation Charge Determination   History Examination Decision-Making   LOW Complexity : Brief history review  LOW Complexity : 1-3 performance deficits relating to physical, cognitive , or psychosocial skils that result in activity limitations and / or participation restrictions  LOW Complexity : No comorbidities that affect functional and no verbal or physical assistance needed to complete eval tasks       Based on the above components, the patient evaluation is determined to be of the following complexity level: LOW   Pain:  Pain Scale 1: Numeric (0 - 10)  Pain Intensity 1: 0              Activity Tolerance:   Patient tolerated eval well. Please refer to the flowsheet for vital signs taken during this treatment.   After treatment:   [x]  Patient left in no apparent distress sitting up in chair  []  Patient left in no apparent distress in bed  [x]  Call bell left within reach  [x]  Nursing notified  []  Caregiver present  []  Bed alarm activated    COMMUNICATION/EDUCATION:   Communication/Collaboration:  [x]      Home safety education was provided and the patient/caregiver indicated understanding. [x]      Patient/family have participated as able and agree with findings and recommendations. []      Patient is unable to participate in plan of care at this time. Findings and recommendations were discussed with: Physical Therapist, Registered Nurse and patient.     Juliet Taylor OTR/L  Time Calculation: 27 mins

## 2018-04-03 VITALS
TEMPERATURE: 99.3 F | RESPIRATION RATE: 16 BRPM | HEART RATE: 63 BPM | SYSTOLIC BLOOD PRESSURE: 133 MMHG | WEIGHT: 278.88 LBS | DIASTOLIC BLOOD PRESSURE: 69 MMHG | HEIGHT: 65 IN | OXYGEN SATURATION: 92 % | BODY MASS INDEX: 46.46 KG/M2

## 2018-04-03 LAB
ALBUMIN SERPL-MCNC: 2.3 G/DL (ref 3.5–5)
ALBUMIN/GLOB SERPL: 0.6 {RATIO} (ref 1.1–2.2)
ALP SERPL-CCNC: 79 U/L (ref 45–117)
ALT SERPL-CCNC: 63 U/L (ref 12–78)
ANION GAP SERPL CALC-SCNC: 3 MMOL/L (ref 5–15)
AST SERPL-CCNC: 25 U/L (ref 15–37)
BACTERIA SPEC CULT: NORMAL
BASOPHILS # BLD: 0 K/UL (ref 0–0.1)
BASOPHILS NFR BLD: 0 % (ref 0–1)
BILIRUB SERPL-MCNC: 0.2 MG/DL (ref 0.2–1)
BUN SERPL-MCNC: 18 MG/DL (ref 6–20)
BUN/CREAT SERPL: 33 (ref 12–20)
CALCIUM SERPL-MCNC: 8.3 MG/DL (ref 8.5–10.1)
CHLORIDE SERPL-SCNC: 101 MMOL/L (ref 97–108)
CO2 SERPL-SCNC: 39 MMOL/L (ref 21–32)
CREAT SERPL-MCNC: 0.55 MG/DL (ref 0.55–1.02)
DIFFERENTIAL METHOD BLD: ABNORMAL
EOSINOPHIL # BLD: 0.2 K/UL (ref 0–0.4)
EOSINOPHIL NFR BLD: 3 % (ref 0–7)
ERYTHROCYTE [DISTWIDTH] IN BLOOD BY AUTOMATED COUNT: 15 % (ref 11.5–14.5)
GLOBULIN SER CALC-MCNC: 3.8 G/DL (ref 2–4)
GLUCOSE SERPL-MCNC: 96 MG/DL (ref 65–100)
HCT VFR BLD AUTO: 37 % (ref 35–47)
HGB BLD-MCNC: 10.6 G/DL (ref 11.5–16)
IMM GRANULOCYTES # BLD: 0 K/UL (ref 0–0.04)
IMM GRANULOCYTES NFR BLD AUTO: 0 % (ref 0–0.5)
LYMPHOCYTES # BLD: 2.4 K/UL (ref 0.8–3.5)
LYMPHOCYTES NFR BLD: 38 % (ref 12–49)
MAGNESIUM SERPL-MCNC: 2.1 MG/DL (ref 1.6–2.4)
MCH RBC QN AUTO: 27.7 PG (ref 26–34)
MCHC RBC AUTO-ENTMCNC: 28.6 G/DL (ref 30–36.5)
MCV RBC AUTO: 96.6 FL (ref 80–99)
MONOCYTES # BLD: 0.6 K/UL (ref 0–1)
MONOCYTES NFR BLD: 9 % (ref 5–13)
NEUTS SEG # BLD: 3.2 K/UL (ref 1.8–8)
NEUTS SEG NFR BLD: 50 % (ref 32–75)
NRBC # BLD: 0 K/UL (ref 0–0.01)
NRBC BLD-RTO: 0 PER 100 WBC
PHOSPHATE SERPL-MCNC: 5 MG/DL (ref 2.6–4.7)
PLATELET # BLD AUTO: 204 K/UL (ref 150–400)
PMV BLD AUTO: 9.6 FL (ref 8.9–12.9)
POTASSIUM SERPL-SCNC: 3.8 MMOL/L (ref 3.5–5.1)
PROT SERPL-MCNC: 6.1 G/DL (ref 6.4–8.2)
RBC # BLD AUTO: 3.83 M/UL (ref 3.8–5.2)
RBC MORPH BLD: ABNORMAL
SERVICE CMNT-IMP: NORMAL
SODIUM SERPL-SCNC: 143 MMOL/L (ref 136–145)
WBC # BLD AUTO: 6.4 K/UL (ref 3.6–11)
WBC #/AREA STL HPF: NORMAL /HPF (ref 0–4)

## 2018-04-03 PROCEDURE — 84100 ASSAY OF PHOSPHORUS: CPT | Performed by: INTERNAL MEDICINE

## 2018-04-03 PROCEDURE — 74011636637 HC RX REV CODE- 636/637: Performed by: INTERNAL MEDICINE

## 2018-04-03 PROCEDURE — 74011000250 HC RX REV CODE- 250: Performed by: INTERNAL MEDICINE

## 2018-04-03 PROCEDURE — 94640 AIRWAY INHALATION TREATMENT: CPT

## 2018-04-03 PROCEDURE — 36415 COLL VENOUS BLD VENIPUNCTURE: CPT | Performed by: INTERNAL MEDICINE

## 2018-04-03 PROCEDURE — 94660 CPAP INITIATION&MGMT: CPT

## 2018-04-03 PROCEDURE — 74011250636 HC RX REV CODE- 250/636: Performed by: INTERNAL MEDICINE

## 2018-04-03 PROCEDURE — 83735 ASSAY OF MAGNESIUM: CPT | Performed by: INTERNAL MEDICINE

## 2018-04-03 PROCEDURE — 85025 COMPLETE CBC W/AUTO DIFF WBC: CPT | Performed by: INTERNAL MEDICINE

## 2018-04-03 PROCEDURE — 77010033678 HC OXYGEN DAILY

## 2018-04-03 PROCEDURE — 74011250637 HC RX REV CODE- 250/637: Performed by: INTERNAL MEDICINE

## 2018-04-03 PROCEDURE — 80053 COMPREHEN METABOLIC PANEL: CPT | Performed by: INTERNAL MEDICINE

## 2018-04-03 RX ORDER — ATORVASTATIN CALCIUM 20 MG/1
20 TABLET, FILM COATED ORAL
Qty: 30 TAB | Refills: 0 | Status: SHIPPED | OUTPATIENT
Start: 2018-04-03 | End: 2018-05-03 | Stop reason: SDUPTHER

## 2018-04-03 RX ADMIN — ACETAMINOPHEN 650 MG: 325 TABLET ORAL at 02:00

## 2018-04-03 RX ADMIN — PREDNISONE 5 MG: 5 TABLET ORAL at 09:30

## 2018-04-03 RX ADMIN — ARFORMOTEROL TARTRATE 15 MCG: 15 SOLUTION RESPIRATORY (INHALATION) at 07:21

## 2018-04-03 RX ADMIN — ACETAMINOPHEN 650 MG: 325 TABLET ORAL at 05:42

## 2018-04-03 RX ADMIN — FUROSEMIDE 40 MG: 40 TABLET ORAL at 09:29

## 2018-04-03 RX ADMIN — Medication 10 ML: at 05:42

## 2018-04-03 RX ADMIN — METOPROLOL SUCCINATE 25 MG: 25 TABLET, EXTENDED RELEASE ORAL at 09:30

## 2018-04-03 RX ADMIN — Medication 10 ML: at 05:39

## 2018-04-03 RX ADMIN — BUDESONIDE 500 MCG: 0.5 INHALANT RESPIRATORY (INHALATION) at 07:21

## 2018-04-03 RX ADMIN — ENOXAPARIN SODIUM 40 MG: 40 INJECTION SUBCUTANEOUS at 09:29

## 2018-04-03 NOTE — PROGRESS NOTES
[address]  [address]  [address] [address] [address]  [phone]      PROGRESS NOTE      Name: Melo Cortez MRN: [id number]   : 1955 Hospital: [facility]   Date: 4/3/2018  Admission Date: 3/29/2018     Chart and notes reviewed. Data reviewed. I have evaluated and examined the patient. ROS:  Almost back to baseline today. She has no cough or wheezing. Some SOB on exertion. On 4L NC which is baseline for her at home. She is ready to leave hospital.  Did not sleep that well last night, but used NIV all night. Mentation back to baseline as well. She was inquiring about a POC with [employer] as with Nolberto Lempster only lasts about 2 hours and too loud for her to bring to work. Overnight events reviewed:   Used NIV overnight  Afebrile   BP and HR stable  Oxygen saturations 93% 4L NC  WBC 6.4  HgB 10.6  Na 143  K 3.8  CO2 39 (trending down)    Imaging:    CXR 3/29/18: diminished lung volumes; increased bibasilar airspace disease may represent atelectasis or pneumonia. Vital Signs:  Visit Vitals    /70 (BP 1 Location: Left arm)    Pulse 69    Temp 98.2 °F (36.8 °C)    Resp 18    Ht 5' 5\" (1.651 m)    Wt 126.5 kg (278 lb 14.1 oz)    LMP 2013    SpO2 93%    Breastfeeding No    BMI 46.41 kg/m2       O2 Device: Nasal cannula   O2 Flow Rate (L/min): 4 l/min   Temp (24hrs), Av.8 °F (37.1 °C), Min:98.2 °F (36.8 °C), Max:99.6 °F (37.6 °C)       Intake/Output:   Last shift:         Last 3 shifts:  1901 -  0700  In: 1520 [P.O.:1520]  Out: 3250 [Urine:3250]    Intake/Output Summary (Last 24 hours) at 18 0918  Last data filed at 18 6367   Gross per 24 hour   Intake             1200 ml   Output             2200 ml   Net            -1000 ml        Physical Exam:   General:  Alert, cooperative, morbidly obese; wearing BiPAP, no distress. Head:  Normocephalic, without obvious abnormality, atraumatic.    Eyes: Conjunctivae/corneas clear. PERRL, EOMs intact. Nose: Nares normal.   Throat: Lips, mucosa, and tongue normal.   Neck: Thick. Lungs:   Decreased sounds bilaterally but no wheezing. Chest wall:  No tenderness or deformity. Heart:  Regular rate and rhythm, S1, S2 normal, no murmur, click, rub or gallop. Abdomen:   Soft, non-tender. Bowel sounds normal.   Extremities: Extremities normal, atraumatic; trace BLE edema. Skin: Warm, dry. Neurologic: Grossly nonfocal.     DATA:  MAR reviewed and pertinent medications noted or modified as needed    Labs:  Recent Labs      04/03/18 0109 04/02/18 0337 04/01/18   0331   WBC  6.4  5.9  6.8   HGB  10.6*  11.4*  10.9*   HCT  37.0  38.9  37.8   PLT  204  192  204     Recent Labs      04/03/18 0109 04/02/18 0337 04/01/18   0331   NA  143  141  143   K  3.8  3.6  3.6   CL  101  101  101   CO2  39*  41*  44*   GLU  96  94  104*   BUN  18 18 18   CREA  0.55  0.58  0.61   CA  8.3*  8.4*  8.4*   MG  2.1   --   2.0   PHOS  5.0*   --   4.3   ALB  2.3*   --    --    SGOT  25   --    --    ALT  63   --    --        Imaging:  I have personally reviewed the patients radiographs and reports.       IMPRESSION:   · Acute/chronic hypoxic hypercapnic respiratory failure  · OHS  · Atelectasis  · Chronic hypercapnia/CO2 retention--ABG better today  · Restrictive lung disease  · AMS  · Dyspnea  · HTN  · Hx of RA  · Morbid obesity      PLAN:   - Maintain oxygen saturations > 90%  - Continue NIV at night and PRN during the day  · Brovana/Pulmicort nebs in substitution for home Advair  · Home prednisone for RA; no need for additional steroids at this time from a pulmonary standpoint; Shannan Sales at home,  will bring today  · Continue home Lasix and metoprolol per primary team  · Appreciate palliative care consult  · PT/OT  · Would like to switch DME companies for oxygen as she would like to try and get a POC that is not loud and lasts more than 2 hours so she can try and work; will try for [employer]  · DVT prophylaxis: Lovenox  · GI Prophylaxis: not indicated    Patient stable from a pulmonary standpoint. Will sign off. Already has follow up scheduled in pulm clinic with Dr. Pierce Smith on 4/20/18. Needs to have Trilogy before going home. ABG 7.18/140/79/52/91 at admission (3/29 1950); improvement on NIV (3/30 0800) with ABG 7.368/96.4/70.7/54.2    A noninvasive ventilator is being ordered for the patient because CPAP and BiPAP will not provide the necessary ventilatory support or settings needed to treat this patient who has respiratory failure caused by COPD.          Lovely Trevizo, NP

## 2018-04-03 NOTE — PROGRESS NOTES
4/3/2018   12:30 PM  CM spoke w/ Mignon Walker from [email or web address], they are unable to accept Pt's insurance as they are out of network w/ her insurance and Pt has $3000.00/yr DME deductible, Mignon Walker has contacted pt to explain. Pt wants to attend outpatient pulmonary rehab at [address] [place] [address], CM faxed [phone] referral to Rene Guillen at [address] [place] [address], noted on AVS.  CM notified Rani Flores at [facility] re: d/c and Trilogy set up. Jose R Redding    10:08 AM  CM spoke w/ Jess Abdul NP pulmonary re: [employer] portable O2 concentrator for d/c, I met w/ Pt she told me her employer has asked her not to return to work w/ her previous concentrator as it is noisy, she also told me they are concerned over liability, she would like me to check coverage w/ [employer]. I spoke w/ Mignon Aaron from [email or web address] [phone] faxed Pt's insurance information [phone] , [email or web address] will rent or sell Beckie Givens will check coverage. CM spoke w/ Rani Flores from MED, Pt is approved for Trilogy, CM will notify MED at Pt's d/c. CM will follow.   Jose R Redding

## 2018-04-03 NOTE — ROUTINE PROCESS
Bedside and Verbal shift change report given to Temi Kirk (oncoming nurse) by Panchito Kirk RN (offgoing nurse). Report included the following information SBAR, Kardex, Intake/Output, MAR, Accordion and Recent Results. Pt refuses care from nurse, stating that she demands to only receive care from a female nurse at this time. Will discuss care with charge nurse to further discuss options related to current patient care. Bedside and Verbal shift change report given to Dora Hudson RN (oncoming nurse) by Michael Antony RN (offgoing nurse). Report included the following information SBAR, Kardex, Intake/Output, MAR, Accordion and Recent Results.

## 2018-04-03 NOTE — DISCHARGE INSTRUCTIONS
HOSPITALIST DISCHARGE INSTRUCTIONS  NAME: Jessica Huff   :  1955   MRN:  [id number]     Date/Time:  4/3/2018 11:09 AM    ADMIT DATE: 3/29/2018     DISCHARGE DATE: 4/3/2018     PRINCIPAL DISCHARGE DIAGNOSES:  Respiratory failure    MEDICATIONS:  · It is important that you take the medication exactly as they are prescribed. Note the changes and additions to your medications. Be sure you understand these changes before you are discharged today. · Keep your medication in the bottles provided by the pharmacist and keep a list of the medication names, dosages, and times to be taken in your wallet. · Do not take other medications without consulting your doctor. Pain Management: per above medications    What to do at Home    Recommended diet:  Resume previous diet    Recommended activity: pulmonary rehab outpatient. The pulmonology team will arrange this for you. If you experience any of the following symptoms then please call your primary care physician or return to the emergency room if you cannot get hold of your doctor:  Fever, chills, severe abdominal pain, nausea, vomiting, diarrhea, confusion, severe chest pain, shortness of breath, or other severe concerning symptoms that brought you to the hospital in the first place    Follow Up: Follow-up Information     Follow up With Details Comments Contact Info    Earl Bowman MD In 1 week  [address]   [address]      Calixto Arita DO  appointment on 18 as scheduled [address]  [phone]              Information obtained by :  I understand that if any problems occur once I am at home I am to contact my physician. I understand and acknowledge receipt of the instructions indicated above.                                                                                                                                            Physician's or R.N.'s Signature Date/Time                                                                                                                                              Patient or Representative Signature                                                          Date/Time

## 2018-04-03 NOTE — PALLIATIVE CARE DISCHARGE
Goals of Care/Treatment Preferences    The Palliative Medicine team was consulted as part of your/your loved one's care in the hospital. Our team is a supportive service; we strive to relieve suffering and improve quality of life. You met with Dr. Kaitlin Ji and  Samantha Lockwood. We reviewed advance care planning information, which includes the following:  Patient's [address] is[de-identified] Verbal statement (Legal Next of Kin remains as decision maker)  Primary Decision Maker Name: Radha Rushing  Primary Decision Maker Phone Number: [phone]  Primary Decision Maker Relationship to Patient: Spouse  Confirm Advance Directive: Yes, not on file    Patient/Health Care Proxy Stated Goals: Other (comment) (full restorative measures)    We reviewed / discussed your code status as: Full Code     Full Code means perform CPR in the event of cardiac arrest.      DNR means do NOT perform CPR in the event of cardiac arrest.      Partial Code means you have specific preferences, please discuss with your healthcare team.      Terryl Viral means this issue was not addressed / resolved during your stay    Medical Interventions: Limited additional interventions  Other Instructions: You indicated that you have an Advance Medical Directive in place which appoints your  Radha Rushing as your medical Power of [address] [place] [address]. If available, please provide a copy to staff to be scanned into your medical record. Because of the importance of this information, we are providing you with a printed copy to share with other healthcare providers after this hospitalization is complete.

## 2018-04-03 NOTE — ACP (ADVANCE CARE PLANNING)
Pt reports she does have AMD in place which appoints  Elida Alves as primary medical POA, copy was requested for chart.   Pt stated she wants to \"die with dignity,\" does not want life sustained for prolonged period on machines though would want attempts at resuscitation in the event of cardiac/respiratory arrest.

## 2018-04-03 NOTE — DISCHARGE SUMMARY
Physician Discharge Summary     Patient ID:  Shakir Farah  [id number]  58 y.o.  1955    Admit date: 3/29/2018    Discharge date: 4/3/2018    Admission Diagnoses: Acute on chronic respiratory failure with hypoxia and hypercapnia [facility])    Principal Discharge Diagnoses:    Acute on chronic resp failure     OTHER PROBLEMS ADDRESSEDS  Principal Diagnosis Acute on chronic respiratory failure with hypoxia and hypercapnia (HCC)                                            Principal Problem:    Acute on chronic respiratory failure with hypoxia and hypercapnia (HCC) (8/5/2017)    Active Problems:    Rheumatoid arthritis involving multiple sites with positive rheumatoid factor ([facility] (10/7/2016)      Essential hypertension (10/7/2016)      Acute encephalopathy (3/4/2017)      Class 3 obesity with serious comorbidity and body mass index (BMI) of 45.0 to 49.9 in adult [facility]) (8/5/2017)      Pulmonary hypertension (8/5/2017)      Dyslipidemia, goal LDL below 70 (8/5/2017)      Coronary artery disease involving native heart (8/5/2017)       Patient Active Problem List   Diagnosis Code    Rheumatoid arthritis involving multiple sites with positive rheumatoid factor (HCC) M05.79    Essential hypertension I10    Acute encephalopathy G93.40    Acute on chronic respiratory failure with hypoxia and hypercapnia (HCC) J96.21, J96.22    Class 3 obesity with serious comorbidity and body mass index (BMI) of 45.0 to 49.9 in adult ([facility] E66.9, Z68.42    Pulmonary hypertension I27.20    Dyslipidemia, goal LDL below 70 E78.5    Coronary artery disease involving native heart I25.10         Hospital Course:     Acute on chronic respiratory failure with hypoxia and hypercapnia: admission due to non-compliance and removal of Trilogy from home. Improved and doing well. Trilogy arranged for home. Appreciate pulmonology evaluation, cleared for discharge, discussed with pulm NP. Continue LABA/ICS.  PT / OT recommend outpatient pulm follow up. [facility] states they will arrange       Rheumatoid arthritis involving multiple sites with positive rheumatoid factor: on immunosuppressive / DMARDs at home. Continue home regimen.        Loose stools / diarrhea: remains afebrile. Stools now formed. No need to check for C. diff       Essential hypertension (): BP controlled, continue metoprolol; on Lasix       Acute encephalopathy: due to hypercapnia. Back to baseline.        Class 3 obesity with serious comorbidity and body mass index (BMI) of 45.0 to 49.9 in adult [facility]): would benefit from weight loss       Pulmonary hypertension: likely from OHS and restrictive lung disease. Mgmt as above       Dyslipidemia, goal LDL below 70: , HDL 67, TG 98. Cont atorvastatin (new)       Coronary artery disease involving native heart: Continue statin and BB. Confirmed with pt that she is on ASA 81mg daily although not listed on home med. Advised to continue       Pt discharged in improved and stable condition. Procedures performed: see above    Imaging studies:   CXR 3/29  Diminished lung volumes. Increased bibasilar airspace disease may  represent atelectasis or pneumonia. Radiographic follow-up is recommended to  assure resolution.       PCP: Mable Mccray MD    Consults: Pulmonary/Intensive care    Discharge Exam:  Patient Vitals for the past 12 hrs:   Temp Pulse Resp BP SpO2   04/03/18 1135 99.3 °F (37.4 °C) 63 16 133/69 92 %     Gen: NAD  Resp:  No accessory muscle use, mildly diminished breath sounds without wheezes, rales, or rhonchi  Card: RRR, without m/r/g. No LE edema. Abd:  +bowel sounds, soft, NTTP, obese      Disposition: home    Patient Instructions:   Current Discharge Medication List      START taking these medications    Details   atorvastatin (LIPITOR) 20 mg tablet Take 1 Tab by mouth nightly.  For high cholesterol  Qty: 30 Tab, Refills: 0         CONTINUE these medications which have NOT CHANGED    Details   fluticasone-salmeterol (ADVAIR DISKUS) 250-50 mcg/dose diskus inhaler Take 1 Puff by inhalation every twelve (12) hours. furosemide (LASIX) 40 mg tablet Take 40 mg by mouth daily. multivitamin (ONE A DAY) tablet Take 1 Tab by mouth daily. predniSONE (DELTASONE) 5 mg tablet TK 1 TO 2 TS PO QD PRN  Refills: 0      acetaminophen (TYLENOL) 325 mg tablet Take 650 mg by mouth every four (4) hours as needed for Pain.      metoprolol succinate (TOPROL-XL) 25 mg XL tablet Take 1 Tab by mouth daily. Qty: 90 Tab, Refills: 3      ergocalciferol (VITAMIN D2) 50,000 unit capsule Take 50,000 Units by mouth Every Friday. On Friday   Indications: Vitamin D Deficiency      vitamins  A,C,E-zinc-copper (OCUVITE PRESERVISION) 9,885- unit-mg-unit tab tablet Take 1 Tab by mouth two (2) times a day. ORENCIA 125 mg/mL syrg 125 mg by SubCUTAneous route every Sunday. On Saturday      leucovorin calcium (WELLCOVORIN) 5 mg tablet Take 5 mg by mouth every seven (7) days. To be taken 24 hours after methotrexate. Activity: See discharge instructions  Diet: See discharge instructions  Wound Care: See discharge instructions    Follow-up Information     Follow up With Details Comments Contact Info    Werner Stanley MD In 1 week  [address] [place] [address] Dr Hollins [address]      Christofer Vallejo DO  appointment on 4/20/18 as scheduled [address]  [phone]            I spent 35 minutes on this discharge.     Signed:  Valerie Grimaldo MD  4/3/2018  11:11 AM

## 2018-04-03 NOTE — PROGRESS NOTES
Palliative Medicine  [place]: [phone] ([phone])  [facility]: [phone] ([phone])      Resuscitation Status: Full Code   Durable DNR addressed? [] Yes   [] No    [x] Not Applicable    Advance Care Planning 4/3/2018   Patient's Healthcare Decision Maker is: Verbal statement (Legal Next of Kin remains as decision maker)   Primary Decision Maker Name Melvin Meraz   Primary Decision Maker Phone Number [phone]   Primary Decision Maker Relationship to Patient Spouse   Confirm Advance Directive Yes, not on file   Does the patient have other document types -           Patient / Family Encounter Documentation    Participants (names): Pt, Palliative Medicine (Dr. Kimberley Villa, Ten Liu)    Narrative:  Pt was up in chair, no family currently present. Pt has discharge order in place; made visit to review DDNR, as pt had previously stated that she would not want attempts at resuscitation in the event of cardiac/respiratory arrest.  Pt today stated she has AMD in place which appoints  Kezia Feldman as her medical POA, reports document states she would not want life prolonged by machines without hope for meaningful recovery. Pt expressed wish to \"die with dignity\" though would like attempts at resuscitation in the event of cardiac/respiratory arrest.        Psychosocial Issues Identified: Pt spoke of the challenges in trying to work while on O2, reports employer has not been pleased with pt's need to leave early at times (to ensure O2 supply would last during drive home), expressed intent to file for disability. Goals of Care / Plan: Discharge home today. Code status changed back to Full Code, per pt wishes. Copy of AMD requested for chart. Pt is looking forward to seeing her dog, Scrappy, when she returns home. SW will be available for support as needed. Thank you for including Palliative Medicine in the care of Ms. Claudene Bon.     Raghav Bruno LCSW, [facility]-  [phone] ([phone])

## 2018-04-03 NOTE — PROGRESS NOTES
Discharge instructions, including information on new medication, were reviewed with patient. All questions were answered, IV and heart monitor were removed. Patient received a copy of her discharge paper and 1 prescriptions and will be discharged home with her . Case Management provided her with information on how to get set up with pulmonary rehab.

## 2018-04-03 NOTE — PROGRESS NOTES
0715- Bedside and Verbal shift change report given to Beth Angela RN (oncoming nurse) by Tiffanie Jacobs RN (offgoing nurse). Report included the following information SBAR, Kardex, Intake/Output, Recent Results, Med Rec Status and Cardiac Rhythm NSR.

## 2018-04-03 NOTE — PROGRESS NOTES
2030: Bedside and Verbal shift change report given to Edgar James RN (oncoming nurse) by Kavon Malone rn (offgoing nurse). Report included the following information SBAR, Kardex, Intake/Output, MAR, Accordion and Cardiac Rhythm nsr. 0730: Bedside and Verbal shift change report given to Mei Melchor rn (oncoming nurse) by Edgar James rn (offgoing nurse). Report included the following information SBAR, Kardex, Procedure Summary, Intake/Output, MAR, Accordion and Cardiac Rhythm NSR.       0745: patient asking about being able to take her home weekly RA injetion. Spoke to Dr. Payam Brown and pharmacy, received order that patient may bring in her home medication to send to pharmacy for adminstration here today and weekly on tuesdays. Received order that pt may be off monitor to shower. Care Plan Summary  Problem: Falls - Risk of  Goal: *Absence of Falls  Document Idania Fall Risk and appropriate interventions in the flowsheet.    Outcome: Progressing Towards Goal  Fall Risk Interventions:  Mobility Interventions: Assess mobility with egress test, Patient to call before getting OOB, PT Consult for mobility concerns, Utilize walker, cane, or other assitive device    Mentation Interventions: Adequate sleep, hydration, pain control, Bed/chair exit alarm, Door open when patient unattended, Familiar objects from home, Family/sitter at bedside, Evaluate medications/consider consulting pharmacy, Eyeglasses and hearing aids, Gait belt with transfers/ambulation, HELP ([address]) if available, Increase mobility, More frequent rounding, Toileting rounds, Update white board    Medication Interventions: Patient to call before getting OOB, Evaluate medications/consider consulting pharmacy    Elimination Interventions: Call light in reach    History of Falls Interventions: Door open when patient unattended, Room close to nurse's station

## 2018-04-04 LAB
BACTERIA SPEC CULT: NORMAL
C JEJUNI+C COLI AG STL QL: NEGATIVE
E COLI SXT1+2 STL IA: NEGATIVE
SERVICE CMNT-IMP: NORMAL

## 2018-04-09 LAB
O+P SPEC MICRO: NORMAL
O+P STL CONC: NORMAL
SPECIMEN SOURCE: NORMAL

## 2018-04-12 ENCOUNTER — OFFICE VISIT (OUTPATIENT)
Dept: INTERNAL MEDICINE CLINIC | Age: 63
End: 2018-04-12

## 2018-04-12 VITALS
HEIGHT: 65 IN | WEIGHT: 293 LBS | RESPIRATION RATE: 20 BRPM | TEMPERATURE: 98.8 F | SYSTOLIC BLOOD PRESSURE: 148 MMHG | BODY MASS INDEX: 48.82 KG/M2 | OXYGEN SATURATION: 94 % | DIASTOLIC BLOOD PRESSURE: 72 MMHG | HEART RATE: 72 BPM

## 2018-04-12 DIAGNOSIS — J96.22 ACUTE ON CHRONIC RESPIRATORY FAILURE WITH HYPOXIA AND HYPERCAPNIA (HCC): Primary | ICD-10-CM

## 2018-04-12 DIAGNOSIS — E66.01 CLASS 3 SEVERE OBESITY DUE TO EXCESS CALORIES WITH SERIOUS COMORBIDITY AND BODY MASS INDEX (BMI) OF 45.0 TO 49.9 IN ADULT (HCC): Chronic | ICD-10-CM

## 2018-04-12 DIAGNOSIS — M05.79 RHEUMATOID ARTHRITIS INVOLVING MULTIPLE SITES WITH POSITIVE RHEUMATOID FACTOR (HCC): Chronic | ICD-10-CM

## 2018-04-12 DIAGNOSIS — I10 ESSENTIAL HYPERTENSION: Chronic | ICD-10-CM

## 2018-04-12 DIAGNOSIS — E78.49 OTHER HYPERLIPIDEMIA: ICD-10-CM

## 2018-04-12 DIAGNOSIS — J96.21 ACUTE ON CHRONIC RESPIRATORY FAILURE WITH HYPOXIA AND HYPERCAPNIA (HCC): Primary | ICD-10-CM

## 2018-04-12 DIAGNOSIS — I25.10 CORONARY ARTERY DISEASE INVOLVING NATIVE HEART, ANGINA PRESENCE UNSPECIFIED, UNSPECIFIED VESSEL OR LESION TYPE: Chronic | ICD-10-CM

## 2018-04-12 PROBLEM — G93.40 ACUTE ENCEPHALOPATHY: Status: RESOLVED | Noted: 2017-03-04 | Resolved: 2018-04-12

## 2018-04-12 NOTE — MR AVS SNAPSHOT
[address] [place] [address] 
[phone] Patient: Tisha Tapia MRN: [id number] Visit Information Date & Time Provider Department Dept. Phone Encounter #  
 4/12/2018 10:30 AM Pattie Alvarado MD [facility] Internal Medicine [phone] [id number] Follow-up Instructions Return in about 8 weeks (around 6/7/2018) for Follow-up. Your Appointments 4/20/2018 11:40 AM  
ESTABLISHED PATIENT with Dana Mohan MD  
[facility] Appt Note: Est pt of [place]-per Dr. Wu first available-Dx dyspnea on exertion, ankle swelling, Acute on chronic respiratory failure with hypoxia and hypercapnia-Records in [place] [address] [address] [facility] [address]  
[address] [facility]  
  
    
 6/8/2018  1:15 PM  
ROUTINE CARE with Pattie Alvarado MD  
[facility] Internal Medicine [facility]) Appt Note: [address] [facility] [address]  
[address] [address] [address] [place] [address] Upcoming Health Maintenance Date Due  
 BREAST CANCER SCRN MAMMOGRAM 11/10/2005 FOBT Q 1 YEAR AGE 50-75 11/10/2005 PAP AKA CERVICAL CYTOLOGY 3/1/2020 DTaP/Tdap/Td series (2 - Td) 12/8/2027 Allergies as of 4/12/2018  Review Complete On: 4/12/2018 By: Pattie Alvarado MD  
  
 Severity Noted Reaction Type Reactions Bee Sting [Sting, Bee] High 03/23/2016    Anaphylaxis Codeine High 03/23/2016    Hives, Shortness of Breath  
 bp drop Methotrexate  03/23/2016    Nausea and Vomiting Penicillins  03/23/2016    Hives, Shortness of Breath  
 bp drop Current Immunizations  Reviewed on 12/8/2017 Name Date Tdap 12/8/2017 Not reviewed this visit You Were Diagnosed With   
  
 Codes Comments Acute on chronic respiratory failure with hypoxia and hypercapnia (HCC)    -  Primary ICD-10-CM: J96.21, J96.22 
ICD-9-CM: 518.84, 786.09, 799.02 Rheumatoid arthritis involving multiple sites with positive rheumatoid factor (HCC)     ICD-10-CM: M05.79 ICD-9-CM: 714.0 Class 3 severe obesity due to excess calories with serious comorbidity and body mass index (BMI) of 45.0 to 49.9 in adult [facility])     ICD-10-CM: E66.01, Z68.42 
ICD-9-CM: 278.01, V85.42 Other hyperlipidemia     ICD-10-CM: E78.4 ICD-9-CM: 272.4 Essential hypertension     ICD-10-CM: I10 
ICD-9-CM: 401.9 Coronary artery disease involving native heart, angina presence unspecified, unspecified vessel or lesion type     ICD-10-CM: I25.10 ICD-9-CM: 414.01 Vitals BP Pulse Temp Resp Height(growth percentile) Weight(growth percentile) 148/72 (BP 1 Location: Right arm, BP Patient Position: Sitting) 72 98.8 °F (37.1 °C) (Oral) 20 5' 5\" (1.651 m) 293 lb 6.4 oz (133.1 kg) LMP SpO2 BMI OB Status Smoking Status 02/23/2013 94% 48.82 kg/m2 Postmenopausal Former Smoker Vitals History BMI and BSA Data Body Mass Index Body Surface Area  
 48.82 kg/m 2 2.47 m 2 Preferred Pharmacy Pharmacy Name Phone [address] [phone] Your Updated Medication List  
  
   
This list is accurate as of 4/12/18 11:33 AM.  Always use your most recent med list.  
  
  
  
  
 David Bayron 250-50 mcg/dose diskus inhaler Generic drug:  fluticasone-salmeterol Take 1 Puff by inhalation every twelve (12) hours. atorvastatin 20 mg tablet Commonly known as:  LIPITOR Take 1 Tab by mouth nightly. For high cholesterol  
  
 furosemide 40 mg tablet Commonly known as:  LASIX Take 40 mg by mouth daily. metoprolol succinate 25 mg XL tablet Commonly known as:  TOPROL-XL  
 Take 1 Tab by mouth daily. multivitamin tablet Commonly known as:  ONE A DAY Take 1 Tab by mouth daily. Ocuvite PreserVision 1,[phone] unit-mg-unit Tab tablet Generic drug:  vitamins  A,C,E-zinc-copper Take 1 Tab by mouth two (2) times a day. ORENCIA 125 mg/mL Syrg Generic drug:  abatacept 125 mg by SubCUTAneous route every Sunday. On Saturday  
  
 predniSONE 5 mg tablet Commonly known as:  DELTASONE  
TK 1 TO 2 TS PO QD PRN  
  
 TYLENOL 325 mg tablet Generic drug:  acetaminophen Take 650 mg by mouth every four (4) hours as needed for Pain. VITAMIN D2 50,000 unit capsule Generic drug:  ergocalciferol Take 50,000 Units by mouth Every Friday. On Friday   Indications: Vitamin D Deficiency We Performed the Following CK [id number] CPT(R)] METABOLIC PANEL, COMPREHENSIVE [32436 CPT(R)] Follow-up Instructions Return in about 8 weeks (around 6/7/2018) for Follow-up. Patient Instructions It was a pleasure to see you! As discussed: 
 
Schedule with Pulmonary Rehab Call our office with the times & dates so we can write your return to work note Have rehab fax info to our office Increase Lasix to 80mg for 3 days then return to 40mg Dose. We have scheduled you with Cardiology. Call rheumatologist regarding your joint pain. Complete labs before next appointment Learning About Saving Energy When You Have a Chronic Condition Introduction Everyday tasks can be tiring when you have COPD, heart failure, or another long-term (chronic) condition. You may feel at times that you've lost your ability to live your life. But learning to conserve, or save, your energy can help you be less tired. Conserving your energy means finding ways of doing daily activities with as little effort as possible. With some small changes in the way you do things, you can get your tasks done more easily. Some treatments are available that might help. Pulmonary rehabilitation can teach you ways to breathe easier. Cardiac rehabilitation can help make your heart stronger. You also may want to see an occupational or physical therapist. The therapist can give you more tips on building strength and moving with less effort. What can you do to conserve your energy? Planning · Make a list of what you have to do every day. Group the tasks by location. · Do all the chores in one part of your house around the same time. · Go out for errands or do chores at the time of day when you have the most energy. · Plan rest periods into your day. Getting things done · Sit down as often as you can when you get dressed, do chores, or cook. · Use a cart with wheels to roll items, such as laundry, from one room to another. · Push or slide boxes or other large items instead of lifting them. Reaching and bending · Put things you use the most on shelves that are at the level of your waist or shoulder. · Use long-handled grabbers or other tools to reach items on a high shelf or to  things off the floor. Use long-handled dusters when you clean the house. · Use a raised toilet seat to avoid bending too far to sit or stand up. Eating · Eat several small meals instead of three larger meals. · If you get too tired to eat much, try to choose healthy foods that have more calories. Have a yogurt-and-fruit smoothie for breakfast. Put avocado on a sandwich. Or add cheese or peanut butter to snacks. · If you don't feel very hungry, try to eat first and drink water or other fluids later, after a meal. This can help keep you from losing weight. Sip small amounts of fluids if you need to drink while you eat. Having sex · Choose the time of day when you have more energy. · A wbdw-ct-aoah position for sex can be less tiring. Sometimes you may want to focus more on caressing. Watch closely for changes in your health, and be sure to contact your doctor if you have any problems. Where can you learn more? Go to http://jose-munira.info/. Enter H190 in the search box to learn more about \"Learning About Saving Energy When You Have a Chronic Condition. \" Current as of: May 12, 2017 Content Version: 11.4 © 9720-6048 [employer]. Care instructions adapted under license by [employer] (which disclaims liability or warranty for this information). If you have questions about a medical condition or this instruction, always ask your healthcare professional. [address] any warranty or liability for your use of this information. Introducing [facility]! Dear Madonna Mclain: Thank you for requesting a SeeSpace account. Our records indicate that you already have an active SeeSpace account. You can access your account anytime at [email or web address] Did you know that you can access your hospital and ER discharge instructions at any time in SeeSpace? You can also review all of your test results from your hospital stay or ER visit. Additional Information If you have questions, please visit the Frequently Asked Questions section of the SeeSpace website at [email or web address] Remember, SeeSpace is NOT to be used for urgent needs. For medical emergencies, dial 911. Now available from your iPhone and Android! Please provide this summary of care documentation to your next provider. Your primary care clinician is listed as [place] [facility]. If you have any questions after today's visit, please call [phone].

## 2018-04-12 NOTE — PATIENT INSTRUCTIONS
It was a pleasure to see you! As discussed:    Schedule with Pulmonary Rehab  Call our office with the times & dates so we can write your return to work note  Have rehab fax info to our office   Increase Lasix to 80mg for 3 days then return to 40mg Dose. We have scheduled you with Cardiology. Call rheumatologist regarding your joint pain. Complete labs before next appointment     Learning About Saving Energy When You Have a Chronic Condition  Introduction    Everyday tasks can be tiring when you have COPD, heart failure, or another long-term (chronic) condition. You may feel at times that you've lost your ability to live your life. But learning to conserve, or save, your energy can help you be less tired. Conserving your energy means finding ways of doing daily activities with as little effort as possible. With some small changes in the way you do things, you can get your tasks done more easily. Some treatments are available that might help. Pulmonary rehabilitation can teach you ways to breathe easier. Cardiac rehabilitation can help make your heart stronger. You also may want to see an occupational or physical therapist. The therapist can give you more tips on building strength and moving with less effort. What can you do to conserve your energy? Planning  · Make a list of what you have to do every day. Group the tasks by location. · Do all the chores in one part of your house around the same time. · Go out for errands or do chores at the time of day when you have the most energy. · Plan rest periods into your day. Getting things done  · Sit down as often as you can when you get dressed, do chores, or cook. · Use a cart with wheels to roll items, such as laundry, from one room to another. · Push or slide boxes or other large items instead of lifting them. Reaching and bending  · Put things you use the most on shelves that are at the level of your waist or shoulder.   · Use long-handled grabbers or other tools to reach items on a high shelf or to  things off the floor. Use long-handled dusters when you clean the house. · Use a raised toilet seat to avoid bending too far to sit or stand up. Eating  · Eat several small meals instead of three larger meals. · If you get too tired to eat much, try to choose healthy foods that have more calories. Have a yogurt-and-fruit smoothie for breakfast. Put avocado on a sandwich. Or add cheese or peanut butter to snacks. · If you don't feel very hungry, try to eat first and drink water or other fluids later, after a meal. This can help keep you from losing weight. Sip small amounts of fluids if you need to drink while you eat. Having sex  · Choose the time of day when you have more energy. · A [id number] position for sex can be less tiring. Sometimes you may want to focus more on caressing. Watch closely for changes in your health, and be sure to contact your doctor if you have any problems. Where can you learn more? Go to http://jose-munira.info/. Enter H190 in the search box to learn more about \"Learning About Saving Energy When You Have a Chronic Condition. \"  Current as of: May 12, 2017  Content Version: 11.4  © 0943-9234 [employer], [employer]. Care instructions adapted under license by [employer] (which disclaims liability or warranty for this information). If you have questions about a medical condition or this instruction, always ask your healthcare professional. [place] [address] any warranty or liability for your use of this information.

## 2018-04-12 NOTE — PROGRESS NOTES
HISTORY OF PRESENT ILLNESS  Leo Correia is a 58 y.o. female. HPI  Transition Care Management:    Admission: 3/29/18  Discharge: 4/3/18  Location: [address] [place] [address]  Diagnosis:  Acute on Chronic Respiratory Failure   Discharge note: Reviewed       Acute on Chronic Respiratory Failure   She has seen pulmonary 1 week. It was recommended she attend pulmonary rehab. However she needs to return to work   Her employer requires a note to return to work. Cardiovascular Review:  The patient has hypertension, coronary artery disease and obesity. Diet and Lifestyle: generally follows a low sodium diet, nonsmoker  Home BP Monitoring: is not measured at home. Pertinent ROS: taking medications as instructed, no medication side effects noted, no TIA's, no chest pain on exertion, +dyspnea on exertion, + swelling of ankles. Review of Systems   Constitutional: Negative for diaphoresis, fever and weight loss. Eyes: Positive for pain. Negative for blurred vision. Respiratory: Positive for shortness of breath (at baseline ). Cardiovascular: Positive for leg swelling. Negative for chest pain and orthopnea. Neurological: Negative for focal weakness and headaches. Psychiatric/Behavioral: Negative for depression. Patient Active Problem List    Diagnosis Date Noted    Acute on chronic respiratory failure with hypoxia and hypercapnia (HCC) 08/05/2017    Class 3 obesity with serious comorbidity and body mass index (BMI) of 45.0 to 49.9 in adult [facility]) 08/05/2017    Pulmonary hypertension 08/05/2017    Dyslipidemia, goal LDL below 70 08/05/2017    Coronary artery disease involving native heart 08/05/2017    Rheumatoid arthritis involving multiple sites with positive rheumatoid factor ([facility] [address] 10/07/2016    Essential hypertension 10/07/2016       Current Outpatient Prescriptions   Medication Sig Dispense Refill    atorvastatin (LIPITOR) 20 mg tablet Take 1 Tab by mouth nightly.  For high cholesterol 30 Tab 0  fluticasone-salmeterol (ADVAIR DISKUS) 250-50 mcg/dose diskus inhaler Take 1 Puff by inhalation every twelve (12) hours.  furosemide (LASIX) 40 mg tablet Take 40 mg by mouth daily.  multivitamin (ONE A DAY) tablet Take 1 Tab by mouth daily.  predniSONE (DELTASONE) 5 mg tablet TK 1 TO 2 TS PO QD PRN  0    acetaminophen (TYLENOL) 325 mg tablet Take 650 mg by mouth every four (4) hours as needed for Pain.  metoprolol succinate (TOPROL-XL) 25 mg XL tablet Take 1 Tab by mouth daily. 90 Tab 3    ergocalciferol (VITAMIN D2) 50,000 unit capsule Take 50,000 Units by mouth Every Friday. On Friday   Indications: Vitamin D Deficiency      vitamins  A,C,E-zinc-copper (OCUVITE PRESERVISION) [phone] unit-mg-unit tab tablet Take 1 Tab by mouth two (2) times a day.  ORENCIA 125 mg/mL syrg 125 mg by SubCUTAneous route every Sunday. On Saturday         Allergies   Allergen Reactions    Bee Sting [Sting, Bee] Anaphylaxis    Codeine Hives and Shortness of Breath     bp drop     Methotrexate Nausea and Vomiting    Penicillins Hives and Shortness of Breath     bp drop      Visit Vitals    /72 (BP 1 Location: Right arm, BP Patient Position: Sitting)    Pulse 72    Temp 98.8 °F (37.1 °C) (Oral)    Resp 20    Ht 5' 5\" (1.651 m)    Wt 293 lb 6.4 oz (133.1 kg)    SpO2 94%    BMI 48.82 kg/m2         Physical Exam   Constitutional: She is oriented to person, place, and time. She appears well-developed. No distress.  present    Eyes: Conjunctivae are normal.   Cardiovascular: Normal rate, regular rhythm and normal heart sounds. Pulmonary/Chest: Effort normal. No respiratory distress. She has no wheezes. She has no rales. She exhibits no tenderness. Decreased at bases  On nasal cannula     Musculoskeletal: She exhibits edema (BLE 2+ to knee ). Neurological: She is alert and oriented to person, place, and time. Skin: Skin is warm.    Psychiatric: She has a normal mood and affect. Lab Results  Component Value Date/Time   WBC 6.4 04/03/2018 01:09 AM   HGB 10.6 (L) 04/03/2018 01:09 AM   HCT 37.0 04/03/2018 01:09 AM   PLATELET 864 72/22/1810 01:09 AM   MCV 96.6 04/03/2018 01:09 AM     Lab Results  Component Value Date/Time   Glucose 96 04/03/2018 01:09 AM   LDL, calculated 143.4 (H) 04/02/2018 05:00 AM   Creatinine 0.55 04/03/2018 01:09 AM      Lab Results  Component Value Date/Time   Cholesterol, total 230 (H) 04/02/2018 05:00 AM   HDL Cholesterol 67 04/02/2018 05:00 AM   LDL, calculated 143.4 (H) 04/02/2018 05:00 AM   Triglyceride 98 04/02/2018 05:00 AM   CHOL/HDL Ratio 3.4 04/02/2018 05:00 AM     Lab Results  Component Value Date/Time   ALT (SGPT) 63 04/03/2018 01:09 AM   AST (SGOT) 25 04/03/2018 01:09 AM   Alk.  phosphatase 79 04/03/2018 01:09 AM   Bilirubin, direct 0.1 03/06/2017 05:07 AM   Bilirubin, total 0.2 04/03/2018 01:09 AM   Albumin 2.3 (L) 04/03/2018 01:09 AM   Protein, total 6.1 (L) 04/03/2018 01:09 AM   Ammonia 18 03/04/2017 11:00 AM   PLATELET 207 52/74/8061 01:09 AM       Lab Results  Component Value Date/Time   GFR est non-AA >60 04/03/2018 01:09 AM   GFR est AA >60 04/03/2018 01:09 AM   Creatinine 0.55 04/03/2018 01:09 AM   BUN 18 04/03/2018 01:09 AM   Sodium 143 04/03/2018 01:09 AM   Potassium 3.8 04/03/2018 01:09 AM   Chloride 101 04/03/2018 01:09 AM   CO2 39 (H) 04/03/2018 01:09 AM   Magnesium 2.1 04/03/2018 01:09 AM   Phosphorus 5.0 (H) 04/03/2018 01:09 AM     Lab Results  Component Value Date/Time   TSH 0.89 03/03/2017 09:25 AM   T4, Free 1.2 03/04/2017 11:00 AM      Lab Results   Component Value Date/Time    Glucose 96 04/03/2018 01:09 AM       Study date: 03-Mar-2017  Status: Routine  Location: Bedside  [facility] ACC #: [id number]    Allergies: STING, BEE, CODEINE, METHOTREXATE, PENICILLINS    Ordering Physician: Radha Garcia MD  Reading [facility]  Technologist: Rvier Li Echo Tech  Reading Physician: Ave Briceño MD    SUMMARY:  Left ventricle: Size was normal. Systolic function was normal. Ejection  fraction was estimated to be 65 %. There were no regional wall motion  abnormalities. Pericardium: There was no pericardial effusion. INDICATIONS: SOB, Acute respiratory failure with hypoxia    HISTORY: Prior history: Rheumatoid arthritis, HTN, Elevated troponin,  Cardiomegaly and pericardial effusion on CT 3/2/17    PROCEDURE: This was a routine study. The study included complete 2D  imaging, M-mode, complete spectral Doppler, and color Doppler. The heart  rate was 82 bpm, at the start of the study. Systolic blood pressure was  139 mmHg, at the start of the study. Diastolic blood pressure was 76 mmHg,  at the start of the study. Images were obtained from the parasternal,  apical, subcostal, and suprasternal notch acoustic windows. Image quality  was adequate. LEFT VENTRICLE: Size was normal. Systolic function was normal. Ejection  fraction was estimated to be 65 %. There were no regional wall motion  abnormalities. Wall thickness was normal.    RIGHT VENTRICLE: The size was normal. Systolic function was normal.    LEFT ATRIUM: Size was normal.    ATRIAL SEPTUM: The atrial septum appeared intact. RIGHT ATRIUM: Size was normal.    MITRAL VALVE: Normal valve structure. DOPPLER: There was no regurgitation. AORTIC VALVE: Normal valve structure. DOPPLER: Transaortic velocity was  within the normal range. There was no stenosis. There was no regurgitation. TRICUSPID VALVE: Normal valve structure. DOPPLER: There was trivial  regurgitation. PULMONIC VALVE: Not well visualized, but normal Doppler findings. AORTA: The root exhibited normal size. PERICARDIUM: There was no pericardial effusion. ASSESSMENT and PLAN  Diagnoses and all orders for this visit:    1. Acute on chronic respiratory failure with hypoxia and hypercapnia ([address]- at baseline. Has seen Pulmonary. Will get repeat CXR if none completed by next visit.      2. Rheumatoid arthritis involving multiple sites with positive rheumatoid factor ([facility] [address]- see #4  -     METABOLIC PANEL, COMPREHENSIVE  -     CK    3. Class 3 severe obesity due to excess calories with serious comorbidity and body mass index (BMI) of 45.0 to 49.9 in adult [facility])- I have reviewed/discussed the above normal BMI with the patient. I have recommended the following interventions: dietary management education, guidance, and counseling . .        4. Other hyperlipidemia- started on Lipitor in hospital. Having some myalgia but also missed her dose of Orencia in hospital. F/u with Rheumatology will check CK also  -     METABOLIC PANEL, COMPREHENSIVE  -     CK    5. Essential hypertension-sBP slightly elevated but improved. Monitor for improvement with diuresis. Lasix increased to 80mg x 3 days. Then return to 40mg. Schedule with cardiology. Counseled on diet and exercise. Continue current regimen.     -     METABOLIC PANEL, COMPREHENSIVE    6. Coronary artery disease involving native heart, angina presence unspecified, unspecified vessel or lesion type- POSSIBLE per Dr. Petty Olea last note. She still has not completed nuclear scan. Follow-up Disposition:  Return in about 8 weeks (around 6/7/2018) for Follow-up. Medication risks/benefits/costs/interactions/alternatives discussed with patient and    Yohannes Vega  was given an after visit summary which includes diagnoses, current medications, & vitals. she expressed understanding with the diagnosis and plan.

## 2018-04-12 NOTE — PROGRESS NOTES
Chief Complaint   Patient presents with   [facility] Follow Up     1. Have you been to the ER, urgent care clinic since your last visit? Hospitalized since your last visit? Yes Where: ER Reason for visit: Hypoxia    2. Have you seen or consulted any other health care providers outside of the [address] [place] [address] since your last visit? Include any pap smears or colon screening.  Yes Where: Pulmonologist Reason for visit: Week ago/follow up

## 2018-04-13 LAB
ALBUMIN SERPL-MCNC: 3.5 G/DL (ref 3.6–4.8)
ALBUMIN/GLOB SERPL: 1.4 {RATIO} (ref 1.2–2.2)
ALP SERPL-CCNC: 102 IU/L (ref 39–117)
ALT SERPL-CCNC: 17 IU/L (ref 0–32)
AST SERPL-CCNC: 15 IU/L (ref 0–40)
BILIRUB SERPL-MCNC: <0.2 MG/DL (ref 0–1.2)
BUN SERPL-MCNC: 8 MG/DL (ref 8–27)
BUN/CREAT SERPL: 17 (ref 12–28)
CALCIUM SERPL-MCNC: 8.8 MG/DL (ref 8.7–10.3)
CHLORIDE SERPL-SCNC: 94 MMOL/L (ref 96–106)
CK SERPL-CCNC: 197 U/L (ref 24–173)
CO2 SERPL-SCNC: 36 MMOL/L (ref 18–29)
CREAT SERPL-MCNC: 0.48 MG/DL (ref 0.57–1)
GFR SERPLBLD CREATININE-BSD FMLA CKD-EPI: 105 ML/MIN/1.73
GFR SERPLBLD CREATININE-BSD FMLA CKD-EPI: 122 ML/MIN/1.73
GLOBULIN SER CALC-MCNC: 2.5 G/DL (ref 1.5–4.5)
GLUCOSE SERPL-MCNC: 96 MG/DL (ref 65–99)
POTASSIUM SERPL-SCNC: 4.3 MMOL/L (ref 3.5–5.2)
PROT SERPL-MCNC: 6 G/DL (ref 6–8.5)
SODIUM SERPL-SCNC: 144 MMOL/L (ref 134–144)

## 2018-04-15 DIAGNOSIS — M05.79 RHEUMATOID ARTHRITIS INVOLVING MULTIPLE SITES WITH POSITIVE RHEUMATOID FACTOR (HCC): Chronic | ICD-10-CM

## 2018-04-15 DIAGNOSIS — R74.8 ELEVATED CK: Primary | ICD-10-CM

## 2018-04-15 NOTE — PROGRESS NOTES
Please let Arnav Craig know that her labs possibly show mild muscle inflammation. This could also be because of her recent illness. Given her medical history before we stop the statin I recommend we check her labs again to see if the level is increasing. I have ordered the labs please have her complete them asap.  ----  Office note  C/o arthalgia and myalgia. H/o RA missed Orencia during hospitalization. +Hyperlipidemia with possible CAD. Has upcoming cardiology appt. Would benefit from statin. Will check CK trend. And order RA inflammatory markers to distinguish if this is RA activity induced myositis vs rhabdomyolysis (given relatively low CK level) could also be elevated from recent respiratory failure with increase WOB. Continue for now.

## 2018-04-16 NOTE — PROGRESS NOTES
Patient has been informed per drs result notes and recs' pt verbalizes understanding .  Lab slip placed at  for patient

## 2018-04-20 LAB
CCP IGA+IGG SERPL IA-ACNC: >250 UNITS (ref 0–19)
CK SERPL-CCNC: 222 U/L (ref 24–173)
CRP SERPL-MCNC: 5.4 MG/L (ref 0–4.9)
ERYTHROCYTE [SEDIMENTATION RATE] IN BLOOD BY WESTERGREN METHOD: 76 MM/HR (ref 0–40)
RHEUMATOID FACT SERPL-ACNC: 180.5 IU/ML (ref 0–13.9)

## 2018-05-03 RX ORDER — METOPROLOL SUCCINATE 25 MG/1
25 TABLET, EXTENDED RELEASE ORAL DAILY
Qty: 90 TAB | Refills: 3 | Status: SHIPPED | OUTPATIENT
Start: 2018-05-03

## 2018-05-03 RX ORDER — METOPROLOL SUCCINATE 25 MG/1
TABLET, EXTENDED RELEASE ORAL
Qty: 90 TAB | Refills: 3 | OUTPATIENT
Start: 2018-05-03

## 2018-05-03 RX ORDER — POTASSIUM CHLORIDE 750 MG/1
CAPSULE, EXTENDED RELEASE ORAL
Qty: 90 CAP | Refills: 3 | OUTPATIENT
Start: 2018-05-03

## 2018-05-03 RX ORDER — ATORVASTATIN CALCIUM 20 MG/1
20 TABLET, FILM COATED ORAL
Qty: 30 TAB | Refills: 3 | Status: SHIPPED | OUTPATIENT
Start: 2018-05-03 | End: 2018-06-08 | Stop reason: SINTOL

## 2018-05-07 RX ORDER — POTASSIUM CHLORIDE 750 MG/1
CAPSULE, EXTENDED RELEASE ORAL
Qty: 90 CAP | Refills: 0 | Status: SHIPPED | OUTPATIENT
Start: 2018-05-07 | End: 2018-10-21 | Stop reason: SDUPTHER

## 2018-05-08 RX ORDER — FUROSEMIDE 40 MG/1
40 TABLET ORAL DAILY
Qty: 90 TAB | Refills: 3 | Status: SHIPPED | OUTPATIENT
Start: 2018-05-08 | End: 2018-06-08 | Stop reason: SDUPTHER

## 2018-05-08 NOTE — PROGRESS NOTES
Please see how Mary Parr is doing and let Mary Parr know as suspected her RA is very active and likely causing her muscle pain. Has she seen her rheumatologist since our visit?

## 2018-05-14 ENCOUNTER — HOSPITAL ENCOUNTER (EMERGENCY)
Age: 63
Discharge: HOME | End: 2018-05-14
Attending: EMERGENCY MEDICINE
Payer: COMMERCIAL

## 2018-05-14 ENCOUNTER — APPOINTMENT (OUTPATIENT)
Dept: GENERAL RADIOLOGY | Age: 63
End: 2018-05-14
Attending: EMERGENCY MEDICINE
Payer: COMMERCIAL

## 2018-05-14 VITALS
RESPIRATION RATE: 20 BRPM | HEART RATE: 79 BPM | HEIGHT: 66 IN | DIASTOLIC BLOOD PRESSURE: 125 MMHG | BODY MASS INDEX: 46.61 KG/M2 | WEIGHT: 290 LBS | TEMPERATURE: 98 F | SYSTOLIC BLOOD PRESSURE: 199 MMHG | OXYGEN SATURATION: 94 %

## 2018-05-14 DIAGNOSIS — J96.11 CHRONIC RESPIRATORY FAILURE WITH HYPOXIA (HCC): Primary | ICD-10-CM

## 2018-05-14 LAB
ANION GAP SERPL CALC-SCNC: 2 MMOL/L (ref 5–15)
ARTERIAL PATENCY WRIST A: YES
BASE EXCESS BLDA CALC-SCNC: 11.3 MMOL/L
BASOPHILS # BLD: 0 K/UL (ref 0–0.1)
BASOPHILS NFR BLD: 0 % (ref 0–1)
BDY SITE: ABNORMAL
BNP SERPL-MCNC: 84 PG/ML (ref 0–125)
BUN SERPL-MCNC: 8 MG/DL (ref 6–20)
BUN/CREAT SERPL: 15 (ref 12–20)
CALCIUM SERPL-MCNC: 9 MG/DL (ref 8.5–10.1)
CHLORIDE SERPL-SCNC: 101 MMOL/L (ref 97–108)
CO2 SERPL-SCNC: 39 MMOL/L (ref 21–32)
CREAT SERPL-MCNC: 0.52 MG/DL (ref 0.55–1.02)
DIFFERENTIAL METHOD BLD: ABNORMAL
EOSINOPHIL # BLD: 0.3 K/UL (ref 0–0.4)
EOSINOPHIL NFR BLD: 5 % (ref 0–7)
ERYTHROCYTE [DISTWIDTH] IN BLOOD BY AUTOMATED COUNT: 14.4 % (ref 11.5–14.5)
GAS FLOW.O2 O2 DELIVERY SYS: 4 L/MIN
GLUCOSE SERPL-MCNC: 96 MG/DL (ref 65–100)
HCO3 BLDA-SCNC: 38 MMOL/L (ref 22–26)
HCT VFR BLD AUTO: 41.5 % (ref 35–47)
HGB BLD-MCNC: 12 G/DL (ref 11.5–16)
IMM GRANULOCYTES # BLD: 0 K/UL (ref 0–0.04)
IMM GRANULOCYTES NFR BLD AUTO: 0 % (ref 0–0.5)
LYMPHOCYTES # BLD: 2.4 K/UL (ref 0.8–3.5)
LYMPHOCYTES NFR BLD: 35 % (ref 12–49)
MCH RBC QN AUTO: 27 PG (ref 26–34)
MCHC RBC AUTO-ENTMCNC: 28.9 G/DL (ref 30–36.5)
MCV RBC AUTO: 93.3 FL (ref 80–99)
MONOCYTES # BLD: 0.6 K/UL (ref 0–1)
MONOCYTES NFR BLD: 8 % (ref 5–13)
NEUTS SEG # BLD: 3.6 K/UL (ref 1.8–8)
NEUTS SEG NFR BLD: 52 % (ref 32–75)
NRBC # BLD: 0 K/UL (ref 0–0.01)
NRBC BLD-RTO: 0 PER 100 WBC
PCO2 BLDA: 56 MMHG (ref 35–45)
PH BLDA: 7.45 [PH] (ref 7.35–7.45)
PLATELET # BLD AUTO: 279 K/UL (ref 150–400)
PMV BLD AUTO: 9.6 FL (ref 8.9–12.9)
PO2 BLDA: 75 MMHG (ref 80–100)
POTASSIUM SERPL-SCNC: 3.8 MMOL/L (ref 3.5–5.1)
RBC # BLD AUTO: 4.45 M/UL (ref 3.8–5.2)
RBC MORPH BLD: ABNORMAL
SAO2 % BLD: 95 % (ref 92–97)
SAO2% DEVICE SAO2% SENSOR NAME: ABNORMAL
SODIUM SERPL-SCNC: 142 MMOL/L (ref 136–145)
SPECIMEN SITE: ABNORMAL
TROPONIN I SERPL-MCNC: 0.04 NG/ML
WBC # BLD AUTO: 6.9 K/UL (ref 3.6–11)

## 2018-05-14 PROCEDURE — 85025 COMPLETE CBC W/AUTO DIFF WBC: CPT | Performed by: EMERGENCY MEDICINE

## 2018-05-14 PROCEDURE — 83880 ASSAY OF NATRIURETIC PEPTIDE: CPT | Performed by: EMERGENCY MEDICINE

## 2018-05-14 PROCEDURE — 36415 COLL VENOUS BLD VENIPUNCTURE: CPT | Performed by: EMERGENCY MEDICINE

## 2018-05-14 PROCEDURE — 99284 EMERGENCY DEPT VISIT MOD MDM: CPT

## 2018-05-14 PROCEDURE — 71046 X-RAY EXAM CHEST 2 VIEWS: CPT

## 2018-05-14 PROCEDURE — 80048 BASIC METABOLIC PNL TOTAL CA: CPT | Performed by: EMERGENCY MEDICINE

## 2018-05-14 PROCEDURE — 84484 ASSAY OF TROPONIN QUANT: CPT | Performed by: EMERGENCY MEDICINE

## 2018-05-14 PROCEDURE — 82803 BLOOD GASES ANY COMBINATION: CPT | Performed by: EMERGENCY MEDICINE

## 2018-05-14 PROCEDURE — 36600 WITHDRAWAL OF ARTERIAL BLOOD: CPT | Performed by: EMERGENCY MEDICINE

## 2018-05-14 RX ORDER — SODIUM CHLORIDE 0.9 % (FLUSH) 0.9 %
5-10 SYRINGE (ML) INJECTION AS NEEDED
Status: DISCONTINUED | OUTPATIENT
Start: 2018-05-14 | End: 2018-05-14 | Stop reason: HOSPADM

## 2018-05-14 RX ORDER — SODIUM CHLORIDE 0.9 % (FLUSH) 0.9 %
5-10 SYRINGE (ML) INJECTION EVERY 8 HOURS
Status: DISCONTINUED | OUTPATIENT
Start: 2018-05-14 | End: 2018-05-14 | Stop reason: HOSPADM

## 2018-05-14 NOTE — ED TRIAGE NOTES
Pt states she is normally on 4LNC. Has noted recently that her O2 level goes down with activity. States it happens sometimes during rehab, to 88% and she stops, does pursed lip breathing and level goes back up. Sees pulmonary. Pt states unsure lung disease. Pt states when her O2 level goes down she shakes when it comes back up.

## 2018-05-14 NOTE — ED NOTES
Pt requesting to walk to restroom, pt asked if she wanted to use bedside commode pt states \"no wants to walk to restroom, is fine\".

## 2018-05-14 NOTE — DISCHARGE INSTRUCTIONS
We hope that we have addressed all of your medical concerns. The examination and treatment you received in the Emergency Department were for an emergent problem and were not intended as complete care. It is important that you follow up with your healthcare provider(s) for ongoing care. If your symptoms worsen or do not improve as expected, and you are unable to reach your usual health care provider(s), you should return to the Emergency Department. Today's healthcare is undergoing tremendous change, and patient satisfaction surveys are one of the many tools to assess the quality of medical care. You may receive a survey from the [employer] regarding your experience in the Emergency Department. I hope that your experience has been completely positive, particularly the medical care that I provided. As such, please participate in the survey; anything less than excellent does not meet my expectations or intentions. [address] [place] [address] participate in nationally recognized quality of care measures. If your blood pressure is greater than 120/80, as reported below, we urge that you seek medical care to address the potential of high blood pressure, commonly known as hypertension. Hypertension can be hereditary or can be caused by certain medical conditions, pain, stress, or \"white coat syndrome. \"       Please make an appointment with your health care provider(s) for follow up of your Emergency Department visit. VITALS:   Patient Vitals for the past 8 hrs:   Temp Pulse Resp BP SpO2   05/14/18 1215 - - - - 94 %   05/14/18 1214 - - - (!) 199/125 90 %   05/14/18 1213 - - - - 92 %   05/14/18 1212 - - - - 92 %   05/14/18 1109 98 °F (36.7 °C) 79 20 (!) 188/118 92 %          Thank you for allowing us to provide you with medical care today. We realize that you have many choices for your emergency care needs.   Please choose us in the future for any continued health care needs. Regards,           Fei BLACK Misha Bray, [address]   Office: [phone]            Recent Results (from the past 24 hour(s))   METABOLIC PANEL, BASIC    Collection Time: 05/14/18 11:21 AM   Result Value Ref Range    Sodium 142 136 - 145 mmol/L    Potassium 3.8 3.5 - 5.1 mmol/L    Chloride 101 97 - 108 mmol/L    CO2 39 (H) 21 - 32 mmol/L    Anion gap 2 (L) 5 - 15 mmol/L    Glucose 96 65 - 100 mg/dL    BUN 8 6 - 20 MG/DL    Creatinine 0.52 (L) 0.55 - 1.02 MG/DL    BUN/Creatinine ratio 15 12 - 20      GFR est AA >60 >60 ml/min/1.73m2    GFR est non-AA >60 >60 ml/min/1.73m2    Calcium 9.0 8.5 - 10.1 MG/DL   CBC WITH AUTOMATED DIFF    Collection Time: 05/14/18 11:21 AM   Result Value Ref Range    WBC 6.9 3.6 - 11.0 K/uL    RBC 4.45 3.80 - 5.20 M/uL    HGB 12.0 11.5 - 16.0 g/dL    HCT 41.5 35.0 - 47.0 %    MCV 93.3 80.0 - 99.0 FL    MCH 27.0 26.0 - 34.0 PG    MCHC 28.9 (L) 30.0 - 36.5 g/dL    RDW 14.4 11.5 - 14.5 %    PLATELET 117 304 - 400 K/uL    MPV 9.6 8.9 - 12.9 FL    NRBC 0.0 0  WBC    ABSOLUTE NRBC 0.00 0.00 - 0.01 K/uL    NEUTROPHILS 52 32 - 75 %    LYMPHOCYTES 35 12 - 49 %    MONOCYTES 8 5 - 13 %    EOSINOPHILS 5 0 - 7 %    BASOPHILS 0 0 - 1 %    IMMATURE GRANULOCYTES 0 0.0 - 0.5 %    ABS. NEUTROPHILS 3.6 1.8 - 8.0 K/UL    ABS. LYMPHOCYTES 2.4 0.8 - 3.5 K/UL    ABS. MONOCYTES 0.6 0.0 - 1.0 K/UL    ABS. EOSINOPHILS 0.3 0.0 - 0.4 K/UL    ABS. BASOPHILS 0.0 0.0 - 0.1 K/UL    ABS. IMM.  GRANS. 0.0 0.00 - 0.04 K/UL    DF AUTOMATED      RBC COMMENTS MACROCYTOSIS  PRESENT       NT-PRO BNP    Collection Time: 05/14/18 11:21 AM   Result Value Ref Range    NT pro-BNP 84 0 - 125 PG/ML   TROPONIN I    Collection Time: 05/14/18 11:21 AM   Result Value Ref Range    Troponin-I, Qt. 0.04 <0.05 ng/mL   BLOOD GAS, ARTERIAL    Collection Time: 05/14/18 11:50 AM   Result Value Ref Range    pH 7.45 7.35 - 7.45      PCO2 56 (H) 35.0 - 45.0 mmHg    PO2 75 (L) 80 - 100 mmHg    O2 SAT 95 92 - 97 %    BICARBONATE 38 (H) 22 - 26 mmol/L    BASE EXCESS 11.3 mmol/L    O2 METHOD NASAL O2      O2 FLOW RATE 4.00 L/min    Sample source ARTERIAL      SITE RIGHT RADIAL      PAUL'S TEST YES         Xr Chest Pa Lat    Result Date: 5/14/2018  History: Hypoxia. 2 views of the chest demonstrate atherosclerosis of the aorta. Heart size is unremarkable. There is subsegmental bibasilar atelectasis. The osseous structures appear normal.     IMPRESSION: Bibasilar atelectasis.

## 2018-05-14 NOTE — ED NOTES
Monitor in pt room spo2 showing system malfunction RN tried to reset machine no success, charge RN to bedside to look at monitor. Pt moved to room 8 after xray was completed.

## 2018-05-14 NOTE — ED PROVIDER NOTES
HPI Comments: 58 y.o. female with past medical history significant for RA and hypertension who presents from home with chief complaint of shortness of breath. Patient states she has been increasingly short of breath upon exertion such as walking over the last few days. Patient notes her oxygen saturations have been dropping down to 88%, and she has been experiencing some tremors as a result. Patient reports her oxygen saturations get better when she stops and takes some deep breaths. Patient states she is chronically on 4 L of oxygen via NC 24 hours a day over the last 1.5 years suspected from excess weight; however, she denies any known lung disease. Patient states she currently follows Dr. Danna Kelly for her lungs. Patient notes she called Dr. Crystal Sow office this morning, and the patient was referred to the ED for further evaluation. Patient denies using any nebulizer treatments at home, and she notes using Advair some time ago with adverse effects. Patient denies any recent illness requiring antibiotics or any recent travels. Patient also denies chest pain, abdominal pain, cough, fever, chills, fatigue, leg swelling, dizziness, light-headedness, and vision changes. There are no other acute medical concerns at this time. Old Chart Review: Per note, patient was admitted here from 03/29/18 through 04/03/18 for acute chronic respiratory failure with hypoxia and hypercapnia. Patient was apparently noncompliant with her Trilogy treatment at home. She was started on Lipitor at the time. Social hx: Tobacco Use: No (former smoker), Alcohol Use: Yes (occasionally), Drug Use: No    PCP: Earl Bowman MD  Pulmonologist: Mehran Sauceda DO    Note written by Sandra Alvarado, as dictated by Danita Carson MD 11:07 AM      The history is provided by the patient and medical records.         Past Medical History:   Diagnosis Date    Hypertension     RA (rheumatoid arthritis) ([facility] [address]        Past Surgical History:   Procedure Laterality Date    HX GYN      R vulvectomy         Family History:   Problem Relation Age of Onset    Heart Disease Father     Hypertension Father     Stroke Father     Heart Disease Mother     Hypertension Mother     Hypertension Sister     Hypertension Brother        Social History     Social History    Marital status:      Spouse name: N/A    Number of children: N/A    Years of education: N/A     Occupational History    Not on file. Social History Main Topics    Smoking status: Former Smoker    Smokeless tobacco: Never Used    Alcohol use Yes      Comment: occasional    Drug use: No    Sexual activity: No     Other Topics Concern    Not on file     Social History Narrative         ALLERGIES: Bee sting [sting, bee]; Codeine; Methotrexate; and Penicillins    Review of Systems   Constitutional: Negative. Negative for appetite change, fever and unexpected weight change. HENT: Negative. Negative for ear pain, hearing loss, nosebleeds, rhinorrhea, sore throat and trouble swallowing. Respiratory: Positive for shortness of breath. Negative for cough and chest tightness. Cardiovascular: Negative. Negative for chest pain and palpitations. Gastrointestinal: Negative. Negative for abdominal distention, abdominal pain, blood in stool and vomiting. Endocrine: Negative. Genitourinary: Negative for dysuria and hematuria. Musculoskeletal: Negative. Negative for back pain and myalgias. Skin: Negative. Negative for rash. Allergic/Immunologic: Negative. Neurological: Negative. Negative for dizziness, syncope, weakness and numbness. Hematological: Negative. Psychiatric/Behavioral: Negative. All other systems reviewed and are negative.       Vitals:    05/14/18 1109   BP: (!) 188/118   Pulse: 79   Resp: 20   Temp: 98 °F (36.7 °C)   SpO2: 92%   Weight: 131.5 kg (290 lb)   Height: 5' 6\" (1.676 m)            Physical Exam   Constitutional: She is oriented to person, place, and time. She appears well-developed and well-nourished. No distress. HENT:   Head: Normocephalic and atraumatic. Right Ear: External ear normal.   Left Ear: External ear normal.   Nose: Nose normal.   Mouth/Throat: Oropharynx is clear and moist.   Eyes: Conjunctivae and EOM are normal. Pupils are equal, round, and reactive to light. Neck: Normal range of motion. Neck supple. No JVD present. No thyromegaly present. Cardiovascular: Normal rate, regular rhythm, normal heart sounds and intact distal pulses. No murmur heard. Pulmonary/Chest: Effort normal and breath sounds normal. No respiratory distress. She has no wheezes. She has no rales. Breath sounds are clear. Abdominal: Soft. Bowel sounds are normal. She exhibits no distension. There is no tenderness. Musculoskeletal: Normal range of motion. She exhibits edema. Chronic 2+ bipedal edema. No calf tenderness. Neurological: She is alert and oriented to person, place, and time. No cranial nerve deficit. Skin: Skin is warm and dry. No rash noted. Psychiatric: She has a normal mood and affect. Her behavior is normal. Thought content normal.      Note written by Sandra Dunne, as dictated by Joo Prado MD 11:07 AM    [facility]      ED Course       Procedures    12:37 PM  Blood gas reveals pH of 7.5, pCO2 of 56, and pO2 of 75, which are all improved from prior blood gas. Chemistry, CBC, BNP, and trop are all unremarkable. Chest x-ray reveals bibasilar atelectasis. 1:04 PM  Paged Dr. Jodi Sauceda 5 times over the last 2 hours with no response. She responded while provider was in a room and was told to [place] the problem. \" Hemanth [place] is apparently coming to the ED but no timeline was given. In light of no acute process, we will discharge the patient home. Patient is comfortable with this plan.

## 2018-05-16 NOTE — PROGRESS NOTES
Made aware of ED provider frustration with myself on Monday based on notes forwarded to our office. Ms. Margarette Hooper is a patient of mine and has been for some time. I was notified by my office on Monday morning that she would be coming to the ER (declined office visit). The only page I received from the ER was at 12:38 stating that the consult was now stat (never received initial consult page but was aware of the patient). Called the ER and asked why it was stat; best I could discern was that the patient was tired of waiting to be seen. Explained that I was extremely busy in the ICU and someone would be down soon. When the pulmonary NP went down there a few minutes later she was told the patient was leaving AMA. We followed up with her and she was seen in the office the next day.

## 2018-06-08 ENCOUNTER — OFFICE VISIT (OUTPATIENT)
Dept: INTERNAL MEDICINE CLINIC | Age: 63
End: 2018-06-08

## 2018-06-08 VITALS
TEMPERATURE: 98.8 F | SYSTOLIC BLOOD PRESSURE: 178 MMHG | HEART RATE: 90 BPM | HEIGHT: 66 IN | DIASTOLIC BLOOD PRESSURE: 100 MMHG | RESPIRATION RATE: 16 BRPM | OXYGEN SATURATION: 96 % | BODY MASS INDEX: 47.09 KG/M2 | WEIGHT: 293 LBS

## 2018-06-08 DIAGNOSIS — I87.2 STASIS DERMATITIS OF BOTH LEGS: ICD-10-CM

## 2018-06-08 DIAGNOSIS — R60.0 BILATERAL EDEMA OF LOWER EXTREMITY: ICD-10-CM

## 2018-06-08 DIAGNOSIS — L03.115 CELLULITIS OF RIGHT LOWER EXTREMITY: ICD-10-CM

## 2018-06-08 DIAGNOSIS — M05.79 RHEUMATOID ARTHRITIS INVOLVING MULTIPLE SITES WITH POSITIVE RHEUMATOID FACTOR (HCC): Chronic | ICD-10-CM

## 2018-06-08 DIAGNOSIS — Z12.39 BREAST CANCER SCREENING: ICD-10-CM

## 2018-06-08 DIAGNOSIS — E78.49 OTHER HYPERLIPIDEMIA: ICD-10-CM

## 2018-06-08 DIAGNOSIS — I10 ESSENTIAL HYPERTENSION: Primary | ICD-10-CM

## 2018-06-08 RX ORDER — DOXYCYCLINE 100 MG/1
100 TABLET ORAL 2 TIMES DAILY
Qty: 14 TAB | Refills: 0 | Status: SHIPPED | OUTPATIENT
Start: 2018-06-08 | End: 2018-06-15

## 2018-06-08 RX ORDER — MUPIROCIN 20 MG/G
OINTMENT TOPICAL DAILY
Qty: 22 G | Refills: 0 | Status: SHIPPED | OUTPATIENT
Start: 2018-06-08 | End: 2018-06-15

## 2018-06-08 RX ORDER — PRAVASTATIN SODIUM 20 MG/1
20 TABLET ORAL
Qty: 30 TAB | Refills: 0 | Status: SHIPPED | OUTPATIENT
Start: 2018-06-08 | End: 2018-08-03

## 2018-06-08 RX ORDER — FUROSEMIDE 40 MG/1
40 TABLET ORAL 2 TIMES DAILY
Qty: 90 TAB | Refills: 3 | COMMUNITY
Start: 2018-06-08

## 2018-06-08 NOTE — MR AVS SNAPSHOT
[address] [facility] [address] [facility] 
[phone] Patient: Vik Batista MRN: [id number] RQK:[id number] Visit Information Date & Time Provider Department Dept. Phone Encounter #  
 6/8/2018  3:15 PM Chyna Marroquin MD [address] [place] [address] Internal Medicine [phone] [id number] Follow-up Instructions Return in about 3 months (around 9/8/2018) for Physical - 30 minute appointment. Your Appointments 6/20/2018 11:00 AM  
Follow Up with Marvin Stratton DO [facility] Appt Note: f/u 5/25/2018 Dimas Hart [address] [place] [id number]  
[address]  [address]  
  
    
 7/3/2018 11:40 AM  
ESTABLISHED PATIENT with Tete Swanson MD  
[facility] ([facility]) Appt Note: Est pt of Rafi-per Dr. Chyna Marroquin first available-Dx dyspnea on exertion, ankle swelling, Acute on chronic respiratory failure with hypoxia and hypercapnia-Records in [facility]; Est pt of Rafi-zbigniew Marroquin first available-Dx dyspnea on exertion, ankle swelling, Acute on chronic respiratory failure with hypoxia and hypercapnia-Records in CC r.s from 4/20  
 [address] [facility]  
[address] [place] [id number] Upcoming Health Maintenance Date Due  
 BREAST CANCER SCRN MAMMOGRAM 11/10/2005 FOBT Q 1 YEAR AGE 50-75 11/10/2005 Influenza Age 5 to Adult 8/1/2018 PAP AKA CERVICAL CYTOLOGY 3/1/2020 DTaP/Tdap/Td series (2 - Td) 12/8/2027 Allergies as of 6/8/2018  Review Complete On: 6/8/2018 By: Chyna Marroquin MD  
  
 Severity Noted Reaction Type Reactions Bee Sting [Sting, Bee] High 03/23/2016    Anaphylaxis Codeine High 03/23/2016    Hives, Shortness of Breath bp drop Methotrexate  03/23/2016    Nausea and Vomiting Penicillins  03/23/2016    Hives, Shortness of Breath  
 bp drop Current Immunizations  Reviewed on 12/8/2017 Name Date Tdap 12/8/2017 Not reviewed this visit You Were Diagnosed With   
  
 Codes Comments Other hyperlipidemia    -  Primary ICD-10-CM: E78.4 ICD-9-CM: 272.4 Rheumatoid arthritis involving multiple sites with positive rheumatoid factor (HCC)     ICD-10-CM: M05.79 ICD-9-CM: 714.0 Stasis dermatitis of both legs     ICD-10-CM: I87.2 ICD-9-CM: 454.1 Cellulitis of right lower extremity     ICD-10-CM: L03.115 ICD-9-CM: 682.6 Breast cancer screening     ICD-10-CM: Z12.31 
ICD-9-CM: V76.10 Bilateral edema of lower extremity     ICD-10-CM: R60.0 ICD-9-CM: 405. 3 Vitals BP Pulse Temp Resp Height(growth percentile) Weight(growth percentile) (!) 178/100 (BP 1 Location: Left arm, BP Patient Position: Sitting) 90 98.8 °F (37.1 °C) (Oral) 16 5' 6\" (1.676 m) 300 lb 9.6 oz (136.4 kg) LMP SpO2 BMI OB Status Smoking Status 02/23/2013 96% 48.52 kg/m2 Postmenopausal Former Smoker Vitals History BMI and BSA Data Body Mass Index Body Surface Area 48.52 kg/m 2 2.52 m 2 Preferred Pharmacy Pharmacy Name Phone [address] [phone] Your Updated Medication List  
  
   
This list is accurate as of 6/8/18  4:08 PM.  Always use your most recent med list.  
  
  
  
  
 Veryl Light 250-50 mcg/dose diskus inhaler Generic drug:  fluticasone-salmeterol Take 1 Puff by inhalation every twelve (12) hours. doxycycline 100 mg tablet Commonly known as:  ADOXA Take 1 Tab by mouth two (2) times a day for 7 days. furosemide 40 mg tablet Commonly known as:  LASIX Take 1 Tab by mouth two (2) times a day. metoprolol succinate 25 mg XL tablet Commonly known as:  TOPROL-XL Take 1 Tab by mouth daily. multivitamin tablet Commonly known as:  ONE A DAY Take 1 Tab by mouth daily. mupirocin 2 % ointment Commonly known as:  [facility] Apply  to affected area daily for 7 days. (open area on right leg) Ocuvite PreserVision [phone] unit-mg-unit Tab tablet Generic drug:  vitamins  A,C,E-zinc-copper Take 1 Tab by mouth two (2) times a day. ORENCIA 125 mg/mL Syrg Generic drug:  abatacept 125 mg by SubCUTAneous route every Sunday. On Saturday  
  
 potassium chloride SA 10 mEq capsule Commonly known as:  Salvadore Honey Take 1 cap daily  
  
 pravastatin 20 mg tablet Commonly known as:  PRAVACHOL Take 1 Tab by mouth nightly. predniSONE 5 mg tablet Commonly known as:  DELTASONE  
TK 1 TO 2 TS PO QD PRN  
  
 TYLENOL 325 mg tablet Generic drug:  acetaminophen Take 650 mg by mouth every four (4) hours as needed for Pain. VITAMIN D2 50,000 unit capsule Generic drug:  ergocalciferol Take 50,000 Units by mouth Every Friday. On Friday   Indications: Vitamin D Deficiency Prescriptions Sent to Pharmacy Refills  
 doxycycline (ADOXA) 100 mg tablet 0 Sig: Take 1 Tab by mouth two (2) times a day for 7 days. Class: Normal  
 Pharmacy: [facility] [address] [facility] [employer] Ph #: [phone] Route: Oral  
 mupirocin (BACTROBAN) 2 % ointment 0 Sig: Apply  to affected area daily for 7 days. (open area on right leg) Class: Normal  
 Pharmacy: [facility] [address] [facility] [employer] Ph #: [phone] Route: Topical  
 pravastatin (PRAVACHOL) 20 mg tablet 0 Sig: Take 1 Tab by mouth nightly.   
 Class: Normal  
 Pharmacy: [facility] [address] [place] [address] #: [phone] Route: Oral  
  
Follow-up Instructions Return in about 3 months (around 9/8/2018) for Physical - 30 minute appointment. To-Do List   
 12/09/2018 Imaging:  [facility] BI SCREENING INCL CAD Patient Instructions It was a pleasure to see you! As discussed: Do not take your vitamins while on antibiotics (doxycycline) Return to clinic if the redness and pain does not improve after the antibiotics. Increase lasix to twice daily. Stop atorvastatin. Start Pravastatin Leg and Ankle Edema: Care Instructions Your Care Instructions Swelling in the legs, ankles, and feet is called edema. It is common after you sit or stand for a while. Long plane flights or car rides often cause swelling in the legs and feet. You may also have swelling if you have to stand for long periods of time at your job. Problems with the veins in the legs (varicose veins) and changes in hormones can also cause swelling. Sometimes the swelling in the ankles and feet is caused by a more serious problem, such as heart failure, infection, blood clots, or liver or kidney disease. Follow-up care is a key part of your treatment and safety. Be sure to make and go to all appointments, and call your doctor if you are having problems. It's also a good idea to know your test results and keep a list of the medicines you take. How can you care for yourself at home? · If your doctor gave you medicine, take it as prescribed. Call your doctor if you think you are having a problem with your medicine. · Whenever you are resting, raise your legs up. Try to keep the swollen area higher than the level of your heart. · Take breaks from standing or sitting in one position. ¨ Walk around to increase the blood flow in your lower legs. ¨ Move your feet and ankles often while you stand, or tighten and relax your leg muscles. · Wear support stockings. Put them on in the morning, before swelling gets worse. · Eat a balanced diet. Lose weight if you need to. · Limit the amount of salt (sodium) in your diet. Salt holds fluid in the body and may increase swelling. When should you call for help? Call 911 anytime you think you may need emergency care. For example, call if: 
? · You have symptoms of a blood clot in your lung (called a pulmonary embolism). These may include: 
¨ Sudden chest pain. ¨ Trouble breathing. ¨ Coughing up blood. ?Call your doctor now or seek immediate medical care if: 
? · You have signs of a blood clot, such as: 
¨ Pain in your calf, back of the knee, thigh, or groin. ¨ Redness and swelling in your leg or groin. ? · You have symptoms of infection, such as: 
¨ Increased pain, swelling, warmth, or redness. ¨ Red streaks or pus. ¨ A fever. ? Watch closely for changes in your health, and be sure to contact your doctor if: 
? · Your swelling is getting worse. ? · You have new or worsening pain in your legs. ? · You do not get better as expected. Where can you learn more? Go to http://jose-munira.info/. Enter F846 in the search box to learn more about \"Leg and Ankle Edema: Care Instructions. \" Current as of: March 20, 2017 Content Version: 11.4 © 4169-8483 [employer]. Care instructions adapted under license by [employer] (which disclaims liability or warranty for this information). If you have questions about a medical condition or this instruction, always ask your healthcare professional. [place] [address] any warranty or liability for your use of this information. Introducing [facility]! Dear Edgar Graft: Thank you for requesting a 8bit account. Our records indicate that you already have an active 8bit account. You can access your account anytime at [email or web address] Did you know that you can access your hospital and ER discharge instructions at any time in [facility]? You can also review all of your test results from your hospital stay or ER visit. Additional Information If you have questions, please visit the Frequently Asked Questions section of the K[employer] website at [email or web address] Remember, KLab is NOT to be used for urgent needs. For medical emergencies, dial 911. Now available from your iPhone and Android! Please provide this summary of care documentation to your next provider. Your primary care clinician is listed as Farhana Lehman. If you have any questions after today's visit, please call [phone].

## 2018-06-08 NOTE — PROGRESS NOTES
Chief Complaint   Patient presents with    Hypertension     1. Have you been to the ER, urgent care clinic since your last visit? Hospitalized since your last visit? No    2. Have you seen or consulted any other health care providers outside of the [address] [place] [address] since your last visit? Include any pap smears or colon screening.  Yes Where: [facility] Reason for visit: Routine follow up

## 2018-06-08 NOTE — PATIENT INSTRUCTIONS
It was a pleasure to see you! As discussed:    Do not take your vitamins while on antibiotics (doxycycline)   Return to clinic if the redness and pain does not improve after the antibiotics. Increase lasix to twice daily. Stop atorvastatin. Start Pravastatin       Leg and Ankle Edema: Care Instructions  Your Care Instructions  Swelling in the legs, ankles, and feet is called edema. It is common after you sit or stand for a while. Long plane flights or car rides often cause swelling in the legs and feet. You may also have swelling if you have to stand for long periods of time at your job. Problems with the veins in the legs (varicose veins) and changes in hormones can also cause swelling. Sometimes the swelling in the ankles and feet is caused by a more serious problem, such as heart failure, infection, blood clots, or liver or kidney disease. Follow-up care is a key part of your treatment and safety. Be sure to make and go to all appointments, and call your doctor if you are having problems. It's also a good idea to know your test results and keep a list of the medicines you take. How can you care for yourself at home? · If your doctor gave you medicine, take it as prescribed. Call your doctor if you think you are having a problem with your medicine. · Whenever you are resting, raise your legs up. Try to keep the swollen area higher than the level of your heart. · Take breaks from standing or sitting in one position. ¨ Walk around to increase the blood flow in your lower legs. ¨ Move your feet and ankles often while you stand, or tighten and relax your leg muscles. · Wear support stockings. Put them on in the morning, before swelling gets worse. · Eat a balanced diet. Lose weight if you need to. · Limit the amount of salt (sodium) in your diet. Salt holds fluid in the body and may increase swelling. When should you call for help? Call 911 anytime you think you may need emergency care.  For example, call if:  ? · You have symptoms of a blood clot in your lung (called a pulmonary embolism). These may include:  ¨ Sudden chest pain. ¨ Trouble breathing. ¨ Coughing up blood. ?Call your doctor now or seek immediate medical care if:  ? · You have signs of a blood clot, such as:  ¨ Pain in your calf, back of the knee, thigh, or groin. ¨ Redness and swelling in your leg or groin. ? · You have symptoms of infection, such as:  ¨ Increased pain, swelling, warmth, or redness. ¨ Red streaks or pus. ¨ A fever. ? Watch closely for changes in your health, and be sure to contact your doctor if:  ? · Your swelling is getting worse. ? · You have new or worsening pain in your legs. ? · You do not get better as expected. Where can you learn more? Go to http://jose-munira.info/. Enter Q416 in the search box to learn more about \"Leg and Ankle Edema: Care Instructions. \"  Current as of: March 20, 2017  Content Version: 11.4  © 0347-4569 [employer]wise[employer]. Care instructions adapted under license by [employer] (which disclaims liability or warranty for this information). If you have questions about a medical condition or this instruction, always ask your healthcare professional. [place] [address] any warranty or liability for your use of this information.

## 2018-06-08 NOTE — PROGRESS NOTES
HISTORY OF PRESENT ILLNESS  Cristina Tyler is a 58 y.o. female. HPI  Cardiovascular Review  The patient has hypertension, hyperlipidemia, coronary artery disease and obesity. Diet and Lifestyle: nonsmoker  Home BP Monitoring: blood pressure well controlled this AM prior to /80 per pt. Pertinent ROS: taking medications as instructed, side effects noted by patient include myalgias, Willing to try pravastatin. no TIA's, no chest pain on exertion, stable dyspnea on exertion,  Or swelling of ankles. Respiratory Review   History of pulmonary HTN and  chronic respiratory failure with hypoxia requiring supplement oxygen. She is closely followed by pulmonology. She has seen her pulmonologist within the last month. She is currently in pulmonary rehab. Cellulitis  Ms. Tracey Ramirez reports increased pain and redness in her bilateral lower extremities related to the past week that is progressively worsening. On the right leg she has noted some mild ulceration. She denies any injury. She has not tried any remedies at home. Denies fevers or  ROSper HPI   Patient Active Problem List    Diagnosis Date Noted    Acute on chronic respiratory failure with hypoxia and hypercapnia ([facility] 08/05/2017    Class 3 obesity with serious comorbidity and body mass index (BMI) of 45.0 to 49.9 in adult [facility]) 08/05/2017    Pulmonary hypertension 08/05/2017    Dyslipidemia, goal LDL below 70 08/05/2017    Coronary artery disease involving native heart 08/05/2017    Rheumatoid arthritis involving multiple sites with positive rheumatoid factor ([facility] 10/07/2016    Essential hypertension 10/07/2016       Current Outpatient Prescriptions   Medication Sig Dispense Refill    furosemide (LASIX) 40 mg tablet Take 1 Tab by mouth daily. 90 Tab 3    potassium chloride SA (MICRO-K) 10 mEq capsule Take 1 cap daily 90 Cap 0    metoprolol succinate (TOPROL-XL) 25 mg XL tablet Take 1 Tab by mouth daily.  90 Tab 3    atorvastatin (LIPITOR) 20 mg tablet Take 1 Tab by mouth nightly. For high cholesterol 30 Tab 3    fluticasone-salmeterol (ADVAIR DISKUS) 250-50 mcg/dose diskus inhaler Take 1 Puff by inhalation every twelve (12) hours.  multivitamin (ONE A DAY) tablet Take 1 Tab by mouth daily.  predniSONE (DELTASONE) 5 mg tablet TK 1 TO 2 TS PO QD PRN  0    acetaminophen (TYLENOL) 325 mg tablet Take 650 mg by mouth every four (4) hours as needed for Pain.  ergocalciferol (VITAMIN D2) 50,000 unit capsule Take 50,000 Units by mouth Every Friday. On Friday   Indications: Vitamin D Deficiency      vitamins  A,C,E-zinc-copper (OCUVITE PRESERVISION) [phone] unit-mg-unit tab tablet Take 1 Tab by mouth two (2) times a day.  ORENCIA 125 mg/mL syrg 125 mg by SubCUTAneous route every Sunday. On Saturday         Allergies   Allergen Reactions    Bee Sting [Sting, Bee] Anaphylaxis    Codeine Hives and Shortness of Breath     bp drop     Methotrexate Nausea and Vomiting    Penicillins Hives and Shortness of Breath     bp drop      Visit Vitals    BP (!) 178/100 (BP 1 Location: Left arm, BP Patient Position: Sitting)    Pulse 90    Temp 98.8 °F (37.1 °C) (Oral)    Resp 16    Ht 5' 6\" (1.676 m)    Wt 300 lb 9.6 oz (136.4 kg)    SpO2 96%    BMI 48.52 kg/m2       Physical Exam   Constitutional: She is oriented to person, place, and time. She appears well-developed. No distress. Eyes: Conjunctivae are normal.   Neck: Neck supple. Cardiovascular: Normal rate and regular rhythm. Pulmonary/Chest: Effort normal and breath sounds normal. No respiratory distress. She has no wheezes. She has no rales. She exhibits no tenderness. On nasal cannula   Musculoskeletal: She exhibits edema (BLE: 2+). Neurological: She is alert and oriented to person, place, and time. Skin: Skin is warm. Psychiatric: She has a normal mood and affect. RLE: Blanching erythema with calor. No induration.   Ulceration with mild weeping as depicted above  Lower extremity not pictured blanching erythematous without calor  Lab Results  Component Value Date/Time   WBC 6.9 05/14/2018 11:21 AM   HGB 12.0 05/14/2018 11:21 AM   HCT 41.5 05/14/2018 11:21 AM   PLATELET 177 30/57/4327 11:21 AM   MCV 93.3 05/14/2018 11:21 AM     Lab Results  Component Value Date/Time   Glucose 96 05/14/2018 11:21 AM   LDL, calculated 143.4 (H) 04/02/2018 05:00 AM   Creatinine 0.52 (L) 05/14/2018 11:21 AM      Lab Results  Component Value Date/Time   Cholesterol, total 230 (H) 04/02/2018 05:00 AM   HDL Cholesterol 67 04/02/2018 05:00 AM   LDL, calculated 143.4 (H) 04/02/2018 05:00 AM   Triglyceride 98 04/02/2018 05:00 AM   CHOL/HDL Ratio 3.4 04/02/2018 05:00 AM     Lab Results  Component Value Date/Time   ALT (SGPT) 17 04/12/2018 12:00 AM   AST (SGOT) 15 04/12/2018 12:00 AM   Alk. phosphatase 102 04/12/2018 12:00 AM   Bilirubin, direct 0.1 03/06/2017 05:07 AM   Bilirubin, total <0.2 04/12/2018 12:00 AM   Albumin 3.5 (L) 04/12/2018 12:00 AM   Protein, total 6.0 04/12/2018 12:00 AM   Ammonia 18 03/04/2017 11:00 AM   PLATELET 374 08/72/1137 11:21 AM       Lab Results  Component Value Date/Time   GFR est non-AA >60 05/14/2018 11:21 AM   GFR est AA >60 05/14/2018 11:21 AM   Creatinine 0.52 (L) 05/14/2018 11:21 AM   BUN 8 05/14/2018 11:21 AM   Sodium 142 05/14/2018 11:21 AM   Potassium 3.8 05/14/2018 11:21 AM   Chloride 101 05/14/2018 11:21 AM   CO2 39 (H) 05/14/2018 11:21 AM   Magnesium 2.1 04/03/2018 01:09 AM   Phosphorus 5.0 (H) 04/03/2018 01:09 AM     Lab Results  Component Value Date/Time   TSH 0.89 03/03/2017 09:25 AM   T4, Free 1.2 03/04/2017 11:00 AM      Lab Results   Component Value Date/Time    Glucose 96 05/14/2018 11:21 AM         ASSESSMENT and PLAN  Diagnoses and all orders for this visit:    HTN- BP  elevated today but reports lower BP at home and PT. No med changes. Has cardiology appt in <1 month. Counseled on low sodium diet and exercise.  Monitor BP at home and notify office if BP remains elevated. Parameters given. See AVS for full details of plan and patient discussion. Other hyperlipidemia- reports myalgias with Lipitor. She is willing to try pravastatin. If this does not work will try niacin. -     pravastatin (PRAVACHOL) 20 mg tablet; Take 1 Tab by mouth nightly. Rheumatoid arthritis involving multiple sites with positive rheumatoid factor ([address]- followed by rheumatology. Stasis dermatitis of both legs- treatment for cellulitis as ordered. If no improvement will need wound clinic. Cellulitis of right lower extremity- mild case treated as order.   -     doxycycline (ADOXA) 100 mg tablet; Take 1 Tab by mouth two (2) times a day for 7 days.  -     mupirocin (BACTROBAN) 2 % ointment; Apply  to affected area daily for 7 days. (open area on right leg)    Breast cancer screening  -     ROSETTA MAMMO BI SCREENING INCL CAD; Future    Bilateral edema of lower extremity- increase lasix to 40mg po BID. Monitor electrolytes. Follow-up Disposition:  Return in about 3 months (around 9/8/2018) for Physical - 30 minute appointment. Medication risks/benefits/costs/interactions/alternatives discussed with patient. Nicky Moody  was given an after visit summary which includes diagnoses, current medications, & vitals. she expressed understanding with the diagnosis and plan.

## 2018-06-19 LAB — CREATININE, EXTERNAL: 0.77

## 2018-06-20 ENCOUNTER — OFFICE VISIT (OUTPATIENT)
Dept: NEUROLOGY | Age: 63
End: 2018-06-20

## 2018-06-20 VITALS
RESPIRATION RATE: 18 BRPM | OXYGEN SATURATION: 95 % | HEART RATE: 88 BPM | DIASTOLIC BLOOD PRESSURE: 108 MMHG | BODY MASS INDEX: 48.26 KG/M2 | SYSTOLIC BLOOD PRESSURE: 166 MMHG | WEIGHT: 293 LBS

## 2018-06-20 DIAGNOSIS — J96.21 ACUTE ON CHRONIC RESPIRATORY FAILURE WITH HYPOXIA AND HYPERCAPNIA (HCC): ICD-10-CM

## 2018-06-20 DIAGNOSIS — I10 ESSENTIAL HYPERTENSION: Chronic | ICD-10-CM

## 2018-06-20 DIAGNOSIS — R41.89 OTHER SYMPTOMS AND SIGNS INVOLVING COGNITIVE FUNCTIONS AND AWARENESS: Primary | ICD-10-CM

## 2018-06-20 DIAGNOSIS — E78.5 DYSLIPIDEMIA, GOAL LDL BELOW 70: Chronic | ICD-10-CM

## 2018-06-20 DIAGNOSIS — J96.22 ACUTE ON CHRONIC RESPIRATORY FAILURE WITH HYPOXIA AND HYPERCAPNIA (HCC): ICD-10-CM

## 2018-06-20 RX ORDER — ASPIRIN 81 MG/1
TABLET ORAL DAILY
COMMUNITY

## 2018-06-20 NOTE — PATIENT INSTRUCTIONS

## 2018-06-20 NOTE — MR AVS SNAPSHOT
[facility] [address] [place] [address] 
[phone] Patient: Yves Villarreal MRN: [id number] Visit Information Date & Time Provider Department Dept. Phone Encounter #  
 6/20/2018  3:00 PM Alise Tinoco, [facility] at [address] [id number] Follow-up Instructions Return if symptoms worsen or fail to improve. Your Appointments 9/7/2018 11:00 AM  
ROUTINE CARE with Melvin Muir MD  
[facility] Internal Medicine [facility]) Appt Note: 3mo f/u  
 [address] [address] [facility]  
  
    
 10/19/2018  9:20 AM  
ESTABLISHED PATIENT with Aurora Hodgson MD  
[facility] ([facility]) Appt Note: Est pt of Rakesh Muir first available-Dx dyspnea on exertion, ankle swelling, Acute on chronic respiratory failure with hypoxia and hypercapnia-Records in [facility]; Est pt of Rakesh Muir first available-Dx dyspnea on exertion, ankle swelling, Acute on chronic respiratory failure with hypoxia and hypercapnia-Records in [facility] r.s from 4/20; Est pt of Rakesh Muir first available-Dx dyspnea on exertion, ankle swelling*TRUNCATED*  
 [address] [place] [address]  
[address] [facility] [address] Upcoming Health Maintenance Date Due  
 BREAST CANCER SCRN MAMMOGRAM 11/10/2005 FOBT Q 1 YEAR AGE 50-75 11/10/2005 Influenza Age 5 to Adult 8/1/2018 PAP AKA CERVICAL CYTOLOGY 3/1/2020 DTaP/Tdap/Td series (2 - Td) 12/8/2027 Allergies as of 6/20/2018  Review Complete On: 6/20/2018 By: Alise Tinoco DO Severity Noted Reaction Type Reactions Bee Sting [Sting, Bee] High 03/23/2016    Anaphylaxis Codeine High 03/23/2016    Hives, Shortness of Breath  
 bp drop Methotrexate  03/23/2016    Nausea and Vomiting Penicillins  03/23/2016    Hives, Shortness of Breath  
 bp drop Current Immunizations  Reviewed on 12/8/2017 Name Date Tdap 12/8/2017 Not reviewed this visit You Were Diagnosed With   
  
 Codes Comments Other symptoms and signs involving cognitive functions and awareness    -  Primary ICD-10-CM: R41.89 ICD-9-CM: 799.59 Acute on chronic respiratory failure with hypoxia and hypercapnia (HCC)     ICD-10-CM: J96.21, J96.22 
ICD-9-CM: 518.84, 786.09, 799.02 Essential hypertension     ICD-10-CM: I10 
ICD-9-CM: 401.9 Dyslipidemia, goal LDL below 70     ICD-10-CM: E78.5 ICD-9-CM: 272.4 Vitals BP Pulse Resp Weight(growth percentile) LMP SpO2  
 (!) 166/108 88 18 299 lb (135.6 kg) 02/23/2013 95% BMI OB Status Smoking Status 48.26 kg/m2 Postmenopausal Former Smoker Vitals History BMI and BSA Data Body Mass Index Body Surface Area  
 48.26 kg/m 2 2.51 m 2 Preferred Pharmacy Pharmacy Name Phone [address] [phone] Your Updated Medication List  
  
   
This list is accurate as of 6/20/18  3:29 PM.  Always use your most recent med list.  
  
  
  
  
 Dane Cousin 250-50 mcg/dose diskus inhaler Generic drug:  fluticasone-salmeterol Take 1 Puff by inhalation every twelve (12) hours. aspirin delayed-release 81 mg tablet Take  by mouth daily. furosemide 40 mg tablet Commonly known as:  LASIX Take 1 Tab by mouth two (2) times a day. metoprolol succinate 25 mg XL tablet Commonly known as:  TOPROL-XL Take 1 Tab by mouth daily. multivitamin tablet Commonly known as:  ONE A DAY Take 1 Tab by mouth daily. Ocuvite PreserVision [phone] unit-mg-unit Tab tablet Generic drug:  vitamins  A,C,E-zinc-copper Take 1 Tab by mouth two (2) times a day. ORENCIA 125 mg/mL Syrg Generic drug:  abatacept 125 mg by SubCUTAneous route every Sunday. On Saturday  
  
 potassium chloride SA 10 mEq capsule Commonly known as:  Ruby Cassis Take 1 cap daily  
  
 pravastatin 20 mg tablet Commonly known as:  PRAVACHOL Take 1 Tab by mouth nightly. predniSONE 5 mg tablet Commonly known as:  DELTASONE  
TK 1 TO 2 TS PO QD PRN  
  
 TYLENOL 325 mg tablet Generic drug:  acetaminophen Take 650 mg by mouth every four (4) hours as needed for Pain. VITAMIN D2 50,000 unit capsule Generic drug:  ergocalciferol Take 50,000 Units by mouth Every Friday. On Friday   Indications: Vitamin D Deficiency Follow-up Instructions Return if symptoms worsen or fail to improve. Patient Instructions A Healthy Lifestyle: Care Instructions Your Care Instructions A healthy lifestyle can help you feel good, stay at a healthy weight, and have plenty of energy for both work and play. A healthy lifestyle is something you can share with your whole family. A healthy lifestyle also can lower your risk for serious health problems, such as high blood pressure, heart disease, and diabetes. You can follow a few steps listed below to improve your health and the health of your family. Follow-up care is a key part of your treatment and safety. Be sure to make and go to all appointments, and call your doctor if you are having problems. It's also a good idea to know your test results and keep a list of the medicines you take. How can you care for yourself at home? · Do not eat too much sugar, fat, or fast foods. You can still have dessert and treats now and then. The goal is moderation. · Start small to improve your eating habits.  Pay attention to portion sizes, drink less juice and soda pop, and eat more fruits and vegetables. ¨ Eat a healthy amount of food. A 3-ounce serving of meat, for example, is about the size of a deck of cards. Fill the rest of your plate with vegetables and whole grains. ¨ Limit the amount of soda and sports drinks you have every day. Drink more water when you are thirsty. ¨ Eat at least 5 servings of fruits and vegetables every day. It may seem like a lot, but it is not hard to reach this goal. A serving or helping is 1 piece of fruit, 1 cup of vegetables, or 2 cups of leafy, raw vegetables. Have an apple or some carrot sticks as an afternoon snack instead of a candy bar. Try to have fruits and/or vegetables at every meal. 
· Make exercise part of your daily routine. You may want to start with simple activities, such as walking, bicycling, or slow swimming. Try to be active 30 to 60 minutes every day. You do not need to do all 30 to 60 minutes all at once. For example, you can exercise 3 times a day for 10 or 20 minutes. Moderate exercise is safe for most people, but it is always a good idea to talk to your doctor before starting an exercise program. 
· Keep moving. Johan Curls the lawn, work in the garden, or eBrevia. Take the stairs instead of the elevator at work. · If you smoke, quit. People who smoke have an increased risk for heart attack, stroke, cancer, and other lung illnesses. Quitting is hard, but there are ways to boost your chance of quitting tobacco for good. ¨ Use nicotine gum, patches, or lozenges. ¨ Ask your doctor about stop-smoking programs and medicines. ¨ Keep trying. In addition to reducing your risk of diseases in the future, you will notice some benefits soon after you stop using tobacco. If you have shortness of breath or asthma symptoms, they will likely get better within a few weeks after you quit. · Limit how much alcohol you drink.  Moderate amounts of alcohol (up to 2 drinks a day for men, 1 drink a day for women) are okay. But drinking too much can lead to liver problems, high blood pressure, and other health problems. Family health If you have a family, there are many things you can do together to improve your health. · Eat meals together as a family as often as possible. · Eat healthy foods. This includes fruits, vegetables, lean meats and dairy, and whole grains. · Include your family in your fitness plan. Most people think of activities such as jogging or tennis as the way to fitness, but there are many ways you and your family can be more active. Anything that makes you breathe hard and gets your heart pumping is exercise. Here are some tips: 
¨ Walk to do errands or to take your child to school or the bus. ¨ Go for a family bike ride after dinner instead of watching TV. Where can you learn more? Go to http://jose-munira.info/. Enter J471 in the search box to learn more about \"A Healthy Lifestyle: Care Instructions. \" Current as of: May 12, 2017 Content Version: 11.4 © 3830-1648 [employer]. Care instructions adapted under license by Rep (which disclaims liability or warranty for this information). If you have questions about a medical condition or this instruction, always ask your healthcare professional. [address] any warranty or liability for your use of this information. Introducing [facility]! Dear Lillian Rodriges: Thank you for requesting a [employer] account. Our records indicate that you already have an active [employer] account. You can access your account anytime at [email or web address] Did you know that you can access your hospital and ER discharge instructions at any time in [employer]? You can also review all of your test results from your hospital stay or ER visit. Additional Information If you have questions, please visit the Frequently Asked Questions section of the [employer] website at [email or web address] Remember, [employer] is NOT to be used for urgent needs. For medical emergencies, dial 911. Now available from your iPhone and Android! Please provide this summary of care documentation to your next provider. Your primary care clinician is listed as Julius Frazier. If you have any questions after today's visit, please call [phone].

## 2018-06-20 NOTE — PROGRESS NOTES
Neurology Clinic Follow up Note    Patient ID:  Shakir Farah  [id number]  59 y.o.  1955      Ms. Kylee Mayfield is here for follow up today of tremor      Last Appointment With Me:  7/14/17      Interval History:   Pt seen approximately one year ago for non physiologic appearing UE tremor. She states she was admitted this past April for hypercapnia. Her boss has requested a letter to return to work. She is a . She made a recent error at work prompting request for neurologic evaluation and return to work letter (added sales tax on an order that should not have been taxed). Denies recall, attentive deficits, disorientation. She is driving and performing all ADLs independently. She is paying bills on time and keeping all appointments appropriately. She denies difficulty with short/long term memory function. PMHx/ PSHx/ FHx/ SHx:  Reviewed and unchanged previous visit. Past Medical History:   Diagnosis Date    Hypertension     RA (rheumatoid arthritis) ([facility] [address]          ROS:  Comprehensive review of systems negative except for as noted above. Objective:       Meds:  Current Outpatient Prescriptions   Medication Sig Dispense Refill    aspirin delayed-release 81 mg tablet Take  by mouth daily.  pravastatin (PRAVACHOL) 20 mg tablet Take 1 Tab by mouth nightly. 30 Tab 0    furosemide (LASIX) 40 mg tablet Take 1 Tab by mouth two (2) times a day. 90 Tab 3    potassium chloride SA (MICRO-K) 10 mEq capsule Take 1 cap daily 90 Cap 0    metoprolol succinate (TOPROL-XL) 25 mg XL tablet Take 1 Tab by mouth daily. 90 Tab 3    fluticasone-salmeterol (ADVAIR DISKUS) 250-50 mcg/dose diskus inhaler Take 1 Puff by inhalation every twelve (12) hours.  multivitamin (ONE A DAY) tablet Take 1 Tab by mouth daily.  predniSONE (DELTASONE) 5 mg tablet TK 1 TO 2 TS PO QD PRN  0    acetaminophen (TYLENOL) 325 mg tablet Take 650 mg by mouth every four (4) hours as needed for Pain.       ergocalciferol (VITAMIN D2) 50,000 unit capsule Take 50,000 Units by mouth Every Friday. On Friday   Indications: Vitamin D Deficiency      vitamins  A,C,E-zinc-copper (OCUVITE PRESERVISION) [phone] unit-mg-unit tab tablet Take 1 Tab by mouth two (2) times a day.  ORENCIA 125 mg/mL syrg 125 mg by SubCUTAneous route every Sunday. On Saturday         Exam:  Visit Vitals    BP (!) 166/108    Pulse 88    Resp 18    Wt 135.6 kg (299 lb)    LMP 02/23/2013    SpO2 95%    BMI 48.26 kg/m2     NEUROLOGICAL EXAM:  General: Awake, alert, speech fluent. MOCA: 26/30 (see scanned media)  CN: PERRL, EOMI without nystagmus, VFF to confrontation, facial sensation and strength are normal and symmetric, hearing is intact to finger rub bilaterally, palate and tongue movements are intact and symmetric. Motor: Normal tone, bulk and strength bilaterally. Reflexes: 2/4 and symmetric, plantar stimulation is flexor. Coordination: No dysmetria. Normal rapid alternating movements; finger-to-nose and heel-to- shin testing are within normal limits. No bradykinesia, rigidity. +Variable, intermittent non-physiologic tremor distal RUE which attenuates with distraction. Sensation: LT intact throughout.   Gait: Normal-based and steady, uses portable O2    LABS  Results for orders placed or performed during the hospital encounter of 05/14/18   BLOOD GAS, ARTERIAL   Result Value Ref Range    pH 7.45 7.35 - 7.45      PCO2 56 (H) 35.0 - 45.0 mmHg    PO2 75 (L) 80 - 100 mmHg    O2 SAT 95 92 - 97 %    BICARBONATE 38 (H) 22 - 26 mmol/L    BASE EXCESS 11.3 mmol/L    O2 METHOD NASAL O2      O2 FLOW RATE 4.00 L/min    Sample source ARTERIAL      SITE RIGHT RADIAL      PAUL'S TEST YES     METABOLIC PANEL, BASIC   Result Value Ref Range    Sodium 142 136 - 145 mmol/L    Potassium 3.8 3.5 - 5.1 mmol/L    Chloride 101 97 - 108 mmol/L    CO2 39 (H) 21 - 32 mmol/L    Anion gap 2 (L) 5 - 15 mmol/L    Glucose 96 65 - 100 mg/dL    BUN 8 6 - 20 MG/DL    Creatinine 0.52 (L) 0.55 - 1.02 MG/DL    BUN/Creatinine ratio 15 12 - 20      GFR est AA >60 >60 ml/min/1.73m2    GFR est non-AA >60 >60 ml/min/1.73m2    Calcium 9.0 8.5 - 10.1 MG/DL   CBC WITH AUTOMATED DIFF   Result Value Ref Range    WBC 6.9 3.6 - 11.0 K/uL    RBC 4.45 3.80 - 5.20 M/uL    HGB 12.0 11.5 - 16.0 g/dL    HCT 41.5 35.0 - 47.0 %    MCV 93.3 80.0 - 99.0 FL    MCH 27.0 26.0 - 34.0 PG    MCHC 28.9 (L) 30.0 - 36.5 g/dL    RDW 14.4 11.5 - 14.5 %    PLATELET 767 090 - 012 K/uL    MPV 9.6 8.9 - 12.9 FL    NRBC 0.0 0  WBC    ABSOLUTE NRBC 0.00 0.00 - 0.01 K/uL    NEUTROPHILS 52 32 - 75 %    LYMPHOCYTES 35 12 - 49 %    MONOCYTES 8 5 - 13 %    EOSINOPHILS 5 0 - 7 %    BASOPHILS 0 0 - 1 %    IMMATURE GRANULOCYTES 0 0.0 - 0.5 %    ABS. NEUTROPHILS 3.6 1.8 - 8.0 K/UL    ABS. LYMPHOCYTES 2.4 0.8 - 3.5 K/UL    ABS. MONOCYTES 0.6 0.0 - 1.0 K/UL    ABS. EOSINOPHILS 0.3 0.0 - 0.4 K/UL    ABS. BASOPHILS 0.0 0.0 - 0.1 K/UL    ABS. IMM. GRANS. 0.0 0.00 - 0.04 K/UL    DF AUTOMATED      RBC COMMENTS MACROCYTOSIS  PRESENT       NT-PRO BNP   Result Value Ref Range    NT pro-BNP 84 0 - 125 PG/ML   TROPONIN I   Result Value Ref Range    Troponin-I, Qt. 0.04 <0.05 ng/mL       IMAGING:  MRI Results (most recent):    Results from Hospital Encounter encounter on 03/02/17   MRI BRAIN W WO CONT   Narrative Clinical history: Confusion, delirium, altered mental status unexplained. AMS  and mild clonus      INDICATION:   Confusion/delirium, altered LOC, unexplained      COMPARISON: CT head 3/4/2017    TECHNIQUE: MR examination of the brain includes axial and sagittal T1  pre-and-post contrast, axial T2, axial FLAIR, axial gradient echo, axial DWI,  coronal T1 postcontrast.    Contrast: The patient was administered gadolinium-based contrast material axial  and coronal T1-weighted postcontrast enhanced imaging was obtained. Please note  that the patient's IV blew during the course of the procedure.     A 2 mL of oral contrast was administered this was followed by saline. There is  however contrast opacification present. FINDINGS:   There are extensive confluent periventricular and scattered foci of abnormal  increased T2 signal intensity in the corona radiata and centrum semiovale. There  is no abnormal parenchymal enhancement. There is no intracranial mass, hemorrhage or evidence of acute infarction. There is no Chiari or syrinx. The pituitary and infundibulum are grossly  unremarkable. There is no skull base mass. Cerebellopontine angles are grossly  unremarkable. The major intracranial vascular flow-voids are unremarkable. No  evidence of demyelinating process. There is no abnormal parenchymal enhancement. There is no abnormal meningeal  enhancement. The cavernous sinuses are symmetric. Optic chiasm and infundibulum  grossly unremarkable. Orbits are grossly symmetric. Dural venous sinuses are  grossly patent. The brain architecture is normal. There is no evidence of midline shift or  mass-effect. There are no extra-axial fluid collections. The mastoid air cells and paranasal sinuses are well pneumatized and clear. Impression IMPRESSION:      Findings most likely related to moderate to severe chronic microvascular  ischemic change for patient age. Infectious, inflammatory and demyelinating  etiologies are less likely overall. Follow-up exams as clinically warranted. There is no intracranial mass, hemorrhage or evidence of acute infarction. Assessment:     Encounter Diagnoses     ICD-10-CM ICD-9-CM   1. Other symptoms and signs involving cognitive functions and awareness R41.89 799.59   2. Acute on chronic respiratory failure with hypoxia and hypercapnia (HCC) J96.21 518.84    J96.22 786.09     799.02   3. Essential hypertension I10 401.9   4.  Dyslipidemia, goal LDL below 70 E78.5 32.4     58year old female w/HTN, HPL, RA, oxygen dependent (obesity hypoventilation syndrome) here for f/u. Sent by her employer for medical/neurologic clearance to return to work after a recent error in calculating tax on an order than should not have been taxed according to the patient. Denies recall, attentive deficits, executive dysfunction or disorientation today. Patient does not endorse memory impairment. MOCA is 26/30 today and within normal range. Points missed for delayed recall only. Previous MRI Brain performed 3/2017 independently reviewed showing moderate to severe white matter ischemic changes without acute ischemia or focal neoplasm. WM lesions are most likely attributable to comorbid HTN, HPL and h/o tobacco use. She was advised to continue daily ASA and address her risk factors to avoid further progressive of her microvascular ischemia. She was advised to return for repeat clinical assessment with any new/worsening symptoms. Return to work letter will be provided. Plan: May return to work without restrictions  Cont. ASA daily for microvascular ischemic changes on imaging  BP control, goal SBP<140  LDL<70    Follow-up Disposition:  Return if symptoms worsen or fail to improve.       Signed:  Avelino Rosenberg DO  6/20/2018  2:48 PM

## 2018-06-20 NOTE — LETTER
6/20/2018 3:14 PM 
 
Ms. Laverne Boateng [address] [place] To Whom It May Concern, 
 
Ms. Honorio Whalen is a patient at the [facility]. From a neurologic stand point she may return to work without restrictions. If you have any questions, please have the patient contact our office.  
 
 
 
 
Sincerely, 
 
 
Vu Quan, DO

## 2018-07-27 ENCOUNTER — HOSPITAL ENCOUNTER (EMERGENCY)
Age: 63
Discharge: HOME | End: 2018-07-27
Attending: EMERGENCY MEDICINE
Payer: COMMERCIAL

## 2018-07-27 VITALS
WEIGHT: 293 LBS | RESPIRATION RATE: 18 BRPM | HEART RATE: 78 BPM | DIASTOLIC BLOOD PRESSURE: 88 MMHG | OXYGEN SATURATION: 96 % | HEIGHT: 66 IN | TEMPERATURE: 98.6 F | SYSTOLIC BLOOD PRESSURE: 190 MMHG | BODY MASS INDEX: 47.09 KG/M2

## 2018-07-27 DIAGNOSIS — L03.115 CELLULITIS OF LEG, RIGHT: Primary | ICD-10-CM

## 2018-07-27 DIAGNOSIS — I89.0 LYMPHEDEMA: ICD-10-CM

## 2018-07-27 LAB
BASOPHILS # BLD: 0 K/UL (ref 0–0.1)
BASOPHILS NFR BLD: 0 % (ref 0–1)
CRP SERPL-MCNC: <0.29 MG/DL
DIFFERENTIAL METHOD BLD: ABNORMAL
EOSINOPHIL # BLD: 0.4 K/UL (ref 0–0.4)
EOSINOPHIL NFR BLD: 5 % (ref 0–7)
ERYTHROCYTE [DISTWIDTH] IN BLOOD BY AUTOMATED COUNT: 15.3 % (ref 11.5–14.5)
ERYTHROCYTE [SEDIMENTATION RATE] IN BLOOD: 41 MM/HR (ref 0–30)
HCT VFR BLD AUTO: 39.4 % (ref 35–47)
HGB BLD-MCNC: 11.7 G/DL (ref 11.5–16)
IMM GRANULOCYTES # BLD: 0 K/UL (ref 0–0.04)
IMM GRANULOCYTES NFR BLD AUTO: 0 % (ref 0–0.5)
LYMPHOCYTES # BLD: 2.8 K/UL (ref 0.8–3.5)
LYMPHOCYTES NFR BLD: 36 % (ref 12–49)
MCH RBC QN AUTO: 27.4 PG (ref 26–34)
MCHC RBC AUTO-ENTMCNC: 29.7 G/DL (ref 30–36.5)
MCV RBC AUTO: 92.3 FL (ref 80–99)
MONOCYTES # BLD: 0.6 K/UL (ref 0–1)
MONOCYTES NFR BLD: 8 % (ref 5–13)
NEUTS SEG # BLD: 4.1 K/UL (ref 1.8–8)
NEUTS SEG NFR BLD: 51 % (ref 32–75)
NRBC # BLD: 0 K/UL (ref 0–0.01)
NRBC BLD-RTO: 0 PER 100 WBC
PLATELET # BLD AUTO: 248 K/UL (ref 150–400)
PMV BLD AUTO: 9.6 FL (ref 8.9–12.9)
RBC # BLD AUTO: 4.27 M/UL (ref 3.8–5.2)
WBC # BLD AUTO: 7.9 K/UL (ref 3.6–11)

## 2018-07-27 PROCEDURE — 96365 THER/PROPH/DIAG IV INF INIT: CPT

## 2018-07-27 PROCEDURE — 85652 RBC SED RATE AUTOMATED: CPT | Performed by: EMERGENCY MEDICINE

## 2018-07-27 PROCEDURE — 74011250636 HC RX REV CODE- 250/636: Performed by: EMERGENCY MEDICINE

## 2018-07-27 PROCEDURE — 36415 COLL VENOUS BLD VENIPUNCTURE: CPT | Performed by: EMERGENCY MEDICINE

## 2018-07-27 PROCEDURE — 99282 EMERGENCY DEPT VISIT SF MDM: CPT

## 2018-07-27 PROCEDURE — 87040 BLOOD CULTURE FOR BACTERIA: CPT | Performed by: EMERGENCY MEDICINE

## 2018-07-27 PROCEDURE — 96366 THER/PROPH/DIAG IV INF ADDON: CPT

## 2018-07-27 PROCEDURE — 86140 C-REACTIVE PROTEIN: CPT | Performed by: EMERGENCY MEDICINE

## 2018-07-27 PROCEDURE — 85025 COMPLETE CBC W/AUTO DIFF WBC: CPT | Performed by: EMERGENCY MEDICINE

## 2018-07-27 RX ORDER — CIPROFLOXACIN 500 MG/1
500 TABLET ORAL 2 TIMES DAILY
Qty: 20 TAB | Refills: 0 | Status: SHIPPED | OUTPATIENT
Start: 2018-07-27 | End: 2018-08-06

## 2018-07-27 RX ORDER — CLINDAMYCIN HYDROCHLORIDE 150 MG/1
300 CAPSULE ORAL 3 TIMES DAILY
Qty: 60 CAP | Refills: 0 | Status: SHIPPED | OUTPATIENT
Start: 2018-07-27 | End: 2018-08-06

## 2018-07-27 RX ADMIN — VANCOMYCIN HYDROCHLORIDE 2500 MG: 10 INJECTION, POWDER, LYOPHILIZED, FOR SOLUTION INTRAVENOUS at 20:40

## 2018-07-27 NOTE — ED TRIAGE NOTES
PT arrives with complaints of right leg pain. Pt was scratched by family dog 3 weeks ago and was [facility]'d [facility]. Pt right shin swollen and red.

## 2018-07-27 NOTE — ED PROVIDER NOTES
HPI Comments: 58 y.o. female with past medical history significant for HTN and RA who presents from home with chief complaint of a skin problem. Pt reports her own dog scratched lower R leg during play 4 weeks ago. Since then, she has been having progressively worsening pain, swelling, and redness over her lower R leg. Per [facility] records, Pt was initially evaluated on 7/10 and was placed on doxycycline and Bactrim. A 10-day course of Keflex was added during her 2nd follow up visit on 7/19. Pt sx have not improved despite multiple antibiotics. She was referred here for hospital admission for IV antibiotics since she has failed multiple outpatient abx. Pt states she is always on oxygen because of elevated CO2 and low O2 levels. She is currently on DonTerraPass Bachelor for RA. There are no other acute medical concerns at this time. PCP: Naomi Stratton MD 
 
Note written by Sandra Mullins, as dictated by Dariusz Li MD 6:17 PM 
 
The history is provided by the patient and the spouse. Past Medical History:  
Diagnosis Date  Hypertension  RA (rheumatoid arthritis) ([facility] [address] Past Surgical History:  
Procedure Laterality Date  HX GYN    
 R vulvectomy Family History:  
Problem Relation Age of Onset  Heart Disease Father  Hypertension Father  Stroke Father  Heart Disease Mother  Hypertension Mother  Hypertension Sister  Hypertension Brother Social History Social History  Marital status:  Spouse name: N/A  
 Number of children: N/A  
 Years of education: N/A Occupational History  Not on file. Social History Main Topics  Smoking status: Former Smoker  Smokeless tobacco: Never Used  Alcohol use Yes Comment: occasional  
 Drug use: No  
 Sexual activity: No  
 
Other Topics Concern  Not on file Social History Narrative ALLERGIES: Bee sting [sting, bee];  Codeine; Methotrexate; and Penicillins Review of Systems Constitutional: Negative for chills and fever. HENT: Negative for ear pain and sore throat. Eyes: Negative for photophobia and pain. Respiratory: Negative for chest tightness and shortness of breath. Cardiovascular: Positive for leg swelling. Negative for chest pain. Gastrointestinal: Negative for abdominal pain, nausea and vomiting. Genitourinary: Negative for dysuria and flank pain. Musculoskeletal: Negative for back pain and neck pain. Skin: Positive for color change and wound. Negative for rash. Neurological: Negative for dizziness, light-headedness and headaches. All other systems reviewed and are negative. Vitals:  
 07/27/18 1743 BP: (!) 226/99 Pulse: 88 Resp: 16 Temp: 98.6 °F (37 °C) SpO2: 97% Weight: 135.6 kg (299 lb) Height: 5' 6\" (1.676 m) Physical Exam  
Constitutional: She appears well-developed and well-nourished. Cardiovascular: Normal rate, regular rhythm and intact distal pulses. Pulmonary/Chest: Effort normal. No respiratory distress. Musculoskeletal: She exhibits edema. She exhibits no deformity. Skin: There is erythema. Mild redness of right lower leg Nursing note and vitals reviewed. MDM Number of Diagnoses or Management Options Cellulitis of leg, right:  
Lymphedema:  
Diagnosis management comments: Patient with mild cellulitis of right lower leg - patient has been on multiple ABX and was sent for possible IV ABX - I suspect the issue more due to the chronic edema not allowing complete resolution of the cellulitis. Consult hospitalist 
 
  
Amount and/or Complexity of Data Reviewed Clinical lab tests: reviewed and ordered Discuss the patient with other providers: yes ED Course Procedures PROGRESS NOTE: 
5:59 PM 
José Luis Tubbs MD reviewed reports of the last 4 [facility] visits over the last 1 month.  
 
CONSULT NOTE: 
7:09 PM José Luis Tubbs MD spoke with Dr. Rose Boston, Consult for Hospitalist.  Discussed available diagnostic tests and clinical findings. Dr. Rose Boston will follow as a consultant. PROGRESS NOTE: 
7:52 PM 
Aide Pak MD spoke w/ Dr. Rose Boston and Pt who are in agreement w/ plan for outpatient treatment after IV abx in the ED. Plan to place Pt on a 10-day course of Cipro and Clindamycin. VITALS:  
Patient Vitals for the past 8 hrs: 
 Temp Pulse Resp BP SpO2  
07/27/18 1743 98.6 °F (37 °C) 88 16 (!) 226/99 97 % Recent Results (from the past 24 hour(s)) CBC WITH AUTOMATED DIFF Collection Time: 07/27/18  6:46 PM  
Result Value Ref Range WBC 7.9 3.6 - 11.0 K/uL  
 RBC 4.27 3.80 - 5.20 M/uL  
 HGB 11.7 11.5 - 16.0 g/dL HCT 39.4 35.0 - 47.0 % MCV 92.3 80.0 - 99.0 FL  
 MCH 27.4 26.0 - 34.0 PG  
 MCHC 29.7 (L) 30.0 - 36.5 g/dL  
 RDW 15.3 (H) 11.5 - 14.5 % PLATELET 365 878 - 257 K/uL MPV 9.6 8.9 - 12.9 FL  
 NRBC 0.0 0  WBC ABSOLUTE NRBC 0.00 0.00 - 0.01 K/uL NEUTROPHILS 51 32 - 75 % LYMPHOCYTES 36 12 - 49 % MONOCYTES 8 5 - 13 % EOSINOPHILS 5 0 - 7 % BASOPHILS 0 0 - 1 % IMMATURE GRANULOCYTES 0 0.0 - 0.5 % ABS. NEUTROPHILS 4.1 1.8 - 8.0 K/UL  
 ABS. LYMPHOCYTES 2.8 0.8 - 3.5 K/UL  
 ABS. MONOCYTES 0.6 0.0 - 1.0 K/UL  
 ABS. EOSINOPHILS 0.4 0.0 - 0.4 K/UL  
 ABS. BASOPHILS 0.0 0.0 - 0.1 K/UL  
 ABS. IMM. GRANS. 0.0 0.00 - 0.04 K/UL  
 DF AUTOMATED    
SED RATE (ESR) Collection Time: 07/27/18  6:46 PM  
Result Value Ref Range Sed rate, automated 41 (H) 0 - 30 mm/hr C REACTIVE PROTEIN, QT Collection Time: 07/27/18  6:46 PM  
Result Value Ref Range C-Reactive protein <0.29 <0.60 mg/dL

## 2018-07-27 NOTE — CONSULTS
[address]  [phone]    Hospitalist Consult Note      NAME:  Corinne Lot   :   1955   MRN:  [id number]     Requesting Physician Heath Seip, MD   Reason for Consult:  LEG WOUND/SWELLING     PCP:  Dawson Alvarez MD     Date/Time:  2018 7:52 PM       Assessment and Plan: Active Problems:    Lymphedema / Leg wound / Cellulitis. Evaluated in ED in concert with Dr. Cecilia Montaño. [facility]x coverage (doxycycline + TMP/SMX x 9 days, followed by keflex x 9 days). While she has not worsened, the wound has failed to resolve completely. In evaluating labs (normal WBC, mild ESR elevated in setting of chronic RA + NORMAL CRP, which is more sensitive in this scenario), I do not feel she is failing treatment per se but has gaps in coverage that could be addressed. We spoke about 1) IV abx over the weekend with possible outpt IV abx v. 2) IV abx over weekend with PO transition v. 3) IV abx once in ED with cipro/clindamycin x 10 days + lymphedema clinic follow-up. Patient, , Dr. Cecilia Montaño, and myself agreed on option #3. Subjective:     CHIEF COMPLAINT: \"It doesn't seem to be going away\"     HISTORY OF PRESENT ILLNESS:     Ms. Caio Liu is a 58 y.o.  female with a hx of RA, obesity who is admitted to the Emergency Department with right leg wound following dog scratch. We are asked to evaluate for disposition purposes. Patient [facility] peculiar PO abx coverage (doxycycline + TMP/SMX x 9 days, followed by keflex x 9 days). While she has not worsened, the wound has failed to resolve completely. [facility] \"[facility]\" but patient is not interested in admission. She denies any fevers, chills, abscess formation.     Past Medical History:   Diagnosis Date    Hypertension     RA (rheumatoid arthritis) ([facility] [address]         Past Surgical History:   Procedure Laterality Date    HX GYN      R vulvectomy       Social History   Substance Use Topics    Smoking status: Former Smoker    Smokeless tobacco: Never Used    Alcohol use Yes      Comment: occasional        Family History   Problem Relation Age of Onset    Heart Disease Father     Hypertension Father     Stroke Father     Heart Disease Mother     Hypertension Mother     Hypertension Sister     Hypertension Brother         Allergies   Allergen Reactions    Bee Sting [Sting, Bee] Anaphylaxis    Codeine Hives and Shortness of Breath     bp drop     Methotrexate Nausea and Vomiting    Penicillins Hives and Shortness of Breath     bp drop        Prior to Admission medications    Medication Sig Start Date End Date Taking? Authorizing Provider   aspirin delayed-release 81 mg tablet Take  by mouth daily. Historical Provider   pravastatin (PRAVACHOL) 20 mg tablet Take 1 Tab by mouth nightly. 6/8/18   Samson Gu MD   furosemide (LASIX) 40 mg tablet Take 1 Tab by mouth two (2) times a day. 6/8/18   Samson Gu MD   potassium chloride SA (MICRO-K) 10 mEq capsule Take 1 cap daily 5/7/18   Samson Gu MD   metoprolol succinate (TOPROL-XL) 25 mg XL tablet Take 1 Tab by mouth daily. 5/3/18   Samson Gu MD   fluticasone-salmeterol (ADVAIR DISKUS) 250-50 mcg/dose diskus inhaler Take 1 Puff by inhalation every twelve (12) hours. Historical Provider   multivitamin (ONE A DAY) tablet Take 1 Tab by mouth daily. Historical Provider   predniSONE (DELTASONE) 5 mg tablet TK 1 TO 2 TS PO QD PRN 10/27/17   Historical Provider   acetaminophen (TYLENOL) 325 mg tablet Take 650 mg by mouth every four (4) hours as needed for Pain. Historical Provider   ergocalciferol (VITAMIN D2) 50,000 unit capsule Take 50,000 Units by mouth Every Friday.  On Friday   Indications: Vitamin D Deficiency    Historical Provider   vitamins A,C,E-zinc-copper (OCUVITE PRESERVISION) [phone] unit-mg-unit tab tablet Take 1 Tab by mouth two (2) times a day. Historical Provider   ORENCIA 125 mg/mL syrg 125 mg by SubCUTAneous route every Sunday. On Saturday 1/29/16   Historical Provider         Current Facility-Administered Medications:     vancomycin (VANCOCIN) 2,000 mg in 0.9% sodium chloride 500 mL IVPB, 2,000 mg, IntraVENous, NOW, Melva Vogel MD    Current Outpatient Prescriptions:     aspirin delayed-release 81 mg tablet, Take  by mouth daily. , Disp: , Rfl:     pravastatin (PRAVACHOL) 20 mg tablet, Take 1 Tab by mouth nightly., Disp: 30 Tab, Rfl: 0    furosemide (LASIX) 40 mg tablet, Take 1 Tab by mouth two (2) times a day., Disp: 90 Tab, Rfl: 3    potassium chloride SA (MICRO-K) 10 mEq capsule, Take 1 cap daily, Disp: 90 Cap, Rfl: 0    metoprolol succinate (TOPROL-XL) 25 mg XL tablet, Take 1 Tab by mouth daily. , Disp: 90 Tab, Rfl: 3    fluticasone-salmeterol (ADVAIR DISKUS) 250-50 mcg/dose diskus inhaler, Take 1 Puff by inhalation every twelve (12) hours. , Disp: , Rfl:     multivitamin (ONE A DAY) tablet, Take 1 Tab by mouth daily. , Disp: , Rfl:     predniSONE (DELTASONE) 5 mg tablet, TK 1 TO 2 TS PO QD PRN, Disp: , Rfl: 0    acetaminophen (TYLENOL) 325 mg tablet, Take 650 mg by mouth every four (4) hours as needed for Pain., Disp: , Rfl:     ergocalciferol (VITAMIN D2) 50,000 unit capsule, Take 50,000 Units by mouth Every Friday. On Friday   Indications: Vitamin D Deficiency, Disp: , Rfl:     vitamins  A,C,E-zinc-copper (OCUVITE PRESERVISION) [phone] unit-mg-unit tab tablet, Take 1 Tab by mouth two (2) times a day., Disp: , Rfl:     ORENCIA 125 mg/mL syrg, 125 mg by SubCUTAneous route every Sunday.  On Saturday, Disp: , Rfl:     Review of Systems:  (bold if positive, if negative)    Gen:  Eyes:  ENT:  CVS:  Pulm:  GI:    :    MS:  Skin:  woundPsych:  Endo:    Hem:  Renal:    Neuro:            Objective: VITALS:    Vital signs reviewed; most recent are:    Visit Vitals    BP (!) 226/99 (BP 1 Location: Right arm, BP Patient Position: Sitting)    Pulse 88    Temp 98.6 °F (37 °C)    Resp 16    Ht 5' 6\" (1.676 m)    Wt 135.6 kg (299 lb)    SpO2 97%    BMI 48.26 kg/m2     SpO2 Readings from Last 6 Encounters:   07/27/18 97%   06/20/18 95%   06/08/18 96%   05/14/18 94%   04/12/18 94%   04/03/18 92%        No intake or output data in the 24 hours ending 07/27/18 1952   All Micro Results     Procedure Component Value Units Date/Time    CULTURE, BLOOD [[id number]] Collected:  07/27/18 1846    Order Status:  Completed Specimen:  Blood Updated:  07/27/18 1934            Exam:     Physical Exam:    Gen:  Well-developed, well-nourished, in no acute distress  HEENT:  Pink conjunctivae, PERRL, hearing intact to voice, moist mucous membranes  Neck:  Supple, without masses, thyroid non-tender  Resp:  No accessory muscle use, clear breath sounds without wheezes rales or rhonchi  Card:  No murmurs, normal S1, S2 without thrills, bruits, +1-2 pitting edema bilaterally  Abd:  Soft, non-tender, non-distended, normoactive bowel sounds are present, no mass  Lymph:  No cervical or inguinal adenopathy  Musc:  No cyanosis or clubbing  Skin:  RLE anterior with small 1 cm gash with eschar on and faint erythema over area with serous weeping  Neuro:  Cranial nerves are grossly intact, no focal motor weakness, follows commands appropriately  Psych:  Good insight, oriented to person, place and time, alert       Labs:    Recent Labs      07/27/18 1846   WBC  7.9   HGB  11.7   HCT  39.4   PLT  248     No results for input(s): NA, K, CL, CO2, GLU, BUN, CREA, CA, MG, PHOS, ALB, TBIL, TBILI, SGOT, ALT in the last 72 hours. No results found for: GLUCPOC  No results for input(s): PH, PCO2, PO2, HCO3, FIO2 in the last 72 hours. No results for input(s): INR in the last 72 hours.     No lab exists for component: INREXT    I have reviewed previous records,    Telemetry reviewed:   normal sinus rhythm    Risk of deterioration: medium      Total time spent with patient: 50 minutes                  Care Plan discussed with: Patient, Family, Nursing Staff, Consultant/Specialist and >50% of time spent in counseling and coordination of care    Discussed:  Care Plan and D/C Planning       ___________________________________________________    Attending Physician: Muna Griffin DO

## 2018-07-27 NOTE — ED NOTES
Bedside and Verbal shift change report given to Loly Alcala (oncoming nurse) by Alisha Mayes (offgoing nurse). Report included the following information SBAR, ED Summary, MAR and Recent Results.

## 2018-07-27 NOTE — DISCHARGE INSTRUCTIONS
Roxana 115 TO SCHEDULE AN APPOINTMENT FOR WOUND CARE AND LYMPHEDEMA (CHRONIC LEG SWELLING)  [address] [facility] [address] [place]  [phone]       Lymphedema: Care Instructions  Your Care Instructions    Lymphedema is fluid that builds up in the arms or legs. It is often caused by surgery to remove lymph nodes during cancer treatment, especially breast cancer surgery, which can cause fluid to build up in the arm. It can happen after radiation treatment to an area that involves lymph nodes. It also can be caused by a fractured bone or surgery to fix a fracture. And some medicines also can cause lymphedema. Some people get it for unknown reasons. Normally, lymph nodes trap bacteria and other substances as fluid flows through them. Then, the white cells in the body's defense, or immune, system can destroy the substances. But if there are few or no lymph nodes-or if the lymph system in an arm or leg has been damaged-fluid can build up in the affected arm or leg. You can take simple steps at home to help treat or prevent fluid buildup. Treatment may include raising the arm or leg to let gravity drain the fluid. You also can wear compression stockings or sleeves. Follow-up care is a key part of your treatment and safety. Be sure to make and go to all appointments, and call your doctor if you are having problems. It's also a good idea to know your test results and keep a list of the medicines you take. How can you care for yourself at home? · Wear a compression stocking or sleeve as your doctor suggests. It can help keep fluid from pooling in an arm or leg. Wear it during air travel. · Prop up the swollen arm or leg on a pillow anytime you sit or lie down. Try to keep it above the level of your heart. This will help reduce swelling. · Avoid crossing your legs if your legs are swollen. · Get some exercise on most days of the week.  Increase the intensity of exercise slowly. Water aerobics can help reduce swelling by helping fluid move around. Wear your compression stocking or sleeve during exercise, but not during water exercise. · See a physical therapist. He or she can teach you how to do self-massage to help fluid move around. You also can learn what activities would be best for you. · Keep your feet clean and wear clean socks or stockings every day. Check your feet often for signs of infection, such as redness or heat. Do not walk barefoot. · If you have had lymph nodes removed from under your arm:  ¨ Do not have blood drawn from the arm on the side of the lymph node surgery. ¨ Do not allow a blood pressure cuff to be placed on that arm. If you are in the hospital, make sure your nurse and other hospital staff know of your condition. ¨ Wear gloves when gardening or doing other activities that may lead to cuts on your fingers or hands. · If you have had lymph nodes removed from your groin:  ¨ Bathe your feet daily in lukewarm, not hot, water. Use a mild soap that has a moisturizer, or use a moisturizer separately. ¨ Check your feet for blisters or cuts. ¨ Wear comfortable and supportive shoes that fit properly. ¨ Wear the correct size of panty hose and stockings. Avoid garters or knee-high or thigh-high stockings. · Ask your doctor how to treat any cuts, scratches, insect bites, or other injuries that may occur. · Use sunscreen and insect repellent when outdoors to protect your skin from sunburn and insect bites. · Wear medical alert jewelry that says you have lymphedema. You can buy these at most [employer] and on the Internet. When should you call for help? Call your doctor now or seek immediate medical care if:    · You have signs of infection, such as:  ¨ Increased pain, swelling, warmth, or redness. ¨ Red streaks leading from the area. ¨ Pus draining from the area.   ¨ A fever.    Watch closely for changes in your health, and be sure to contact your doctor if:    · You have new or worse symptoms from lymphedema.     · You do not get better as expected. Where can you learn more? Go to http://jose-munira.info/. Enter V398 in the search box to learn more about \"Lymphedema: Care Instructions. \"  Current as of: May 12, 2017  Content Version: 11.7  © 6025-2641 [employer]. Care instructions adapted under license by [employer] (which disclaims liability or warranty for this information). If you have questions about a medical condition or this instruction, always ask your healthcare professional. [address] any warranty or liability for your use of this information. Cellulitis: Care Instructions  Your Care Instructions    Cellulitis is a skin infection caused by bacteria, most often strep or staph. It often occurs after a break in the skin from a scrape, cut, bite, or puncture, or after a rash. Cellulitis may be treated without doing tests to find out what caused it. But your doctor may do tests, if needed, to look for a specific bacteria, like methicillin-resistant Staphylococcus aureus (MRSA). The doctor has checked you carefully, but problems can develop later. If you notice any problems or new symptoms, get medical treatment right away. Follow-up care is a key part of your treatment and safety. Be sure to make and go to all appointments, and call your doctor if you are having problems. It's also a good idea to know your test results and keep a list of the medicines you take. How can you care for yourself at home? · Take your antibiotics as directed. Do not stop taking them just because you feel better. You need to take the full course of antibiotics. · Prop up the infected area on pillows to reduce pain and swelling. Try to keep the area above the level of your heart as often as you can. · If your doctor told you how to care for your wound, follow your doctor's instructions. If you did not get instructions, follow this general advice:  ¨ Wash the wound with clean water 2 times a day. Don't use hydrogen peroxide or alcohol, which can slow healing. ¨ You may cover the wound with a thin layer of petroleum jelly, such as Vaseline, and a nonstick bandage. ¨ Apply more petroleum jelly and replace the bandage as needed. · Be safe with medicines. Take pain medicines exactly as directed. ¨ If the doctor gave you a prescription medicine for pain, take it as prescribed. ¨ If you are not taking a prescription pain medicine, ask your doctor if you can take an over-the-counter medicine. To prevent cellulitis in the future  · Try to prevent cuts, scrapes, or other injuries to your skin. Cellulitis most often occurs where there is a break in the skin. · If you get a scrape, cut, mild burn, or bite, wash the wound with clean water as soon as you can to help avoid infection. Don't use hydrogen peroxide or alcohol, which can slow healing. · If you have swelling in your legs (edema), support stockings and good skin care may help prevent leg sores and cellulitis. · Take care of your feet, especially if you have diabetes or other conditions that increase the risk of infection. Wear shoes and socks. Do not go barefoot. If you have athlete's foot or other skin problems on your feet, talk to your doctor about how to treat them. When should you call for help? Call your doctor now or seek immediate medical care if:    · You have signs that your infection is getting worse, such as:  ¨ Increased pain, swelling, warmth, or redness. ¨ Red streaks leading from the area. ¨ Pus draining from the area. ¨ A fever.     · You get a rash.    Watch closely for changes in your health, and be sure to contact your doctor if:    · You do not get better as expected. Where can you learn more? Go to http://jose-munira.info/.   Juancarlos Daily in the search box to learn more about \"Cellulitis: Care Instructions. \"  Current as of: May 10, 2017  Content Version: 11.7  © 6471-7776 [employer], [facility]. Care instructions adapted under license by [employer] (which disclaims liability or warranty for this information). If you have questions about a medical condition or this instruction, always ask your healthcare professional. [place] [address] any warranty or liability for your use of this information.

## 2018-07-30 ENCOUNTER — TELEPHONE (OUTPATIENT)
Dept: WOUND CARE | Age: 63
End: 2018-07-30

## 2018-07-30 NOTE — PROGRESS NOTES
7/30/2018 
2:26 PM 
Case Management Note (late entry due to phone call from patient) Patient called this am concerned that the wound clinic would not see her without order from her PCP. I called wound center and they were going to call her. Patient also had made appointment with her PCP for this Friday. Jerrod Olivera, [address]

## 2018-08-02 LAB
BACTERIA SPEC CULT: NORMAL
SERVICE CMNT-IMP: NORMAL

## 2018-08-03 ENCOUNTER — HOSPITAL ENCOUNTER (OUTPATIENT)
Dept: WOUND CARE | Age: 63
Discharge: HOME | End: 2018-08-03
Payer: COMMERCIAL

## 2018-08-03 VITALS
HEART RATE: 80 BPM | BODY MASS INDEX: 47.09 KG/M2 | HEIGHT: 66 IN | TEMPERATURE: 98.9 F | RESPIRATION RATE: 19 BRPM | SYSTOLIC BLOOD PRESSURE: 164 MMHG | WEIGHT: 293 LBS | DIASTOLIC BLOOD PRESSURE: 100 MMHG

## 2018-08-03 PROBLEM — I89.0 LYMPH EDEMA: Status: ACTIVE | Noted: 2018-08-03

## 2018-08-03 PROBLEM — T14.8XXA ANIMAL SCRATCH: Status: ACTIVE | Noted: 2018-08-03

## 2018-08-03 PROBLEM — I87.333 CHRONIC VENOUS HYPERTENSION (IDIOPATHIC) WITH ULCER AND INFLAMMATION OF BILATERAL LOWER EXTREMITY (HCC): Status: ACTIVE | Noted: 2018-08-03

## 2018-08-03 PROBLEM — L97.929 CHRONIC VENOUS HYPERTENSION (IDIOPATHIC) WITH ULCER AND INFLAMMATION OF BILATERAL LOWER EXTREMITY (HCC): Status: ACTIVE | Noted: 2018-08-03

## 2018-08-03 PROBLEM — L03.115 CELLULITIS OF RIGHT LOWER EXTREMITY: Status: ACTIVE | Noted: 2018-08-03

## 2018-08-03 PROBLEM — M05.20 RHEUMATOID ARTERITIS (HCC): Status: ACTIVE | Noted: 2018-08-03

## 2018-08-03 PROBLEM — J44.9 COPD (CHRONIC OBSTRUCTIVE PULMONARY DISEASE) (HCC): Status: ACTIVE | Noted: 2018-08-03

## 2018-08-03 PROBLEM — L97.919 CHRONIC VENOUS HYPERTENSION (IDIOPATHIC) WITH ULCER AND INFLAMMATION OF BILATERAL LOWER EXTREMITY (HCC): Status: ACTIVE | Noted: 2018-08-03

## 2018-08-03 PROCEDURE — 99204 OFFICE O/P NEW MOD 45 MIN: CPT

## 2018-08-03 PROCEDURE — 11042 DBRDMT SUBQ TIS 1ST 20SQCM/<: CPT

## 2018-08-03 NOTE — WOUND CARE
08/03/18 0818 Wound Leg Right;Medial  
Date First Assessed/Time First Assessed: 08/03/18 0817   POA: Yes  Wound Type: Diabetic  Location: Leg  Orientation: Right;Medial  
Non-Pressure Injury Full thickness (subcut/muscle) Wound Length (cm) 1.2 cm Wound Width (cm) 0.4 cm Wound Depth (cm) 0.2 Wound Surface area (cm^2) 0.48 cm^2 Condition of Base Eschar Drainage Amount  None Wound Odor None Periwound Skin Condition Edematous; Erythema, blanchable Cleansing and Cleansing Agents  Normal saline Wound Leg Right; Anterior Date First Assessed/Time First Assessed: 08/03/18 0818   POA: Yes  Wound Type: Diabetic  Location: Leg  Orientation: Right; Anterior Non-Pressure Injury Partial thickness (epider/derm) Wound Length (cm) 1 cm Wound Width (cm) 0.7 cm Wound Depth (cm) 0.1 Wound Surface area (cm^2) 0.7 cm^2 Condition of [facility] Drainage Amount  None Wound Odor None Periwound Skin Condition Edematous; Erythema, blanchable Cleansing and Cleansing Agents  Normal saline

## 2018-08-03 NOTE — PROGRESS NOTES
Chief Complaint (CC): dog bite wounds won't heal R leg. . 
Present Illness (PI): patient had scratch from home pet dog last week, the wound got red and tender, has had edema both legs for a couple years, but never noticed blisters form as now on the LLE. Pertinent Past History (PMedHx): RA, recently developed pulmonary, 02 requiring COPD, she believes from under treated bronchitis. . 
                      Medications and Allergies: as per [facility]. I have reviewed and concur. Illnesses: as per '[facility]' recorded data noted today. Surgeries and Injuries: as per '[facility]' recorded data noted today. Review of Systems (ROS): 
                      Integumentary: Other than as noted in 'PI'; skin hair and nails normal for age, with no new rash, lumps, bumps, eruptions or bleeding. Lymph: no new prominent nodes or drainage near lymph nodes. Bones, Joints, and Muscles: Other than as noted in 'PI' no new fractures, dislocations, weakness or pain. Several joints hurt but no worse. . 
                      Hematopoietic: no new bleeding or bruising or anemia changes. 0. Eyes: no recent trauma or inflammation. has. Eye glasses. no. Intra Occular Lens Implants (IOLI) Ears: Hearing is unchanged and usually good. Nose: no new drainage, rhinorhea or epistaxis. Mouth, and throat: no soreness, drainage or lesions. no. Dentures. Neck: no new masses, drainage or pain Respiratory; no hemoptysis, current shortness of breath or pain with breath. Cardiovascular: No chest pain, palpitation or tachycardia. No complaints. Gastrointestinal: no recent change in appetite, stools or food tolerance.  No jaundice, hematemesis, vomiting or diarrhea Genito-Urinary: urine color, frequency, sensation unchanged Neurologic: no syncope, dizzyness or unusual sensations. Psychologic and Mental Status: no change in mood, sleep or memory recently Social History: '[facility]' data today is noted. Lives at home,  will help with dressings. Family History: '[facility]' data today is noted. [facility] Physical Exam:  
  
General:  alert high BMI. Head, Eyes, Ears, Nose and Throat: normocephalic, PERRLA EOMI, several filled caries, . Neck: supple without masses or adenopathy. No bruit. Chest: full excursion without deformity, reduced sounds. . 
Lungs: clear but distant. [facility] Heart: RSR. No M Abdomen: soft round protuberant. Vascular:  
                      Pulses:                                            R                    L  
                                            Radial                        ++. ++. 
                                            Femoral                     +.                 +. 
                                            DP                             +.                 +. 
                                            PT                             +.                 -. 
Extremities: RLE mid anterior pretib linear ulcer through to subQ other linear crusted markings, red surround to linear ulcer with some tenderness. LLE vessicle to dermis mid anterior pretib, 3+ pitting R> L with thickened skin R/L. Other: [facility] Vital signs and data recorded in '[facility]' for this patient today is noted, reviewed and considered. Patient notes today: some pain in the RLE wound. Procedure Note Name of Procedure: sharp debridement, selective debridement. PreOp diagnosis: Stasis dermatitis, lymphedema, stasis ulceration (L), open dog scratch wound RLE. [facility] Anaesthesia: Lidocaine; topical  
Description: using a sharp curette I removed vessicle (unroofing) to lesion LLE, and also crusting and slough in ulcer R pretib ulcer to effect a clean bleeding base. Post debridement dimensions changed as noted:  
 Depth, add 1.0 mm;  
 Width, add 0.0 mm Length, add 0.0 mm. Blood Loss: 1. CCs. Post Op Diagnosis, and condition: pain RLE. Follow Up Plan: return 1 week, daily cleanse, gentamycin to wounds, aquacel double tubi support, vascular eval. 
 
Specimens: 0. Counseled regarding/Discussed: as above both venous and lymph edema now with immune issue and animal related wound, risk of infection (on cipro). Clinical considerations: avoid trauma, no tube soaks, note weight problem but will address edema problem first. 
Future: should heal, will need long term edema management.

## 2018-08-07 NOTE — PROGRESS NOTES
Pt called and did not receive her supplies, called [facility] and her insurance is out of network. Called and left a voice mail for the pt. To call clinic.

## 2018-08-10 ENCOUNTER — HOSPITAL ENCOUNTER (OUTPATIENT)
Dept: WOUND CARE | Age: 63
Discharge: HOME | End: 2018-08-10
Payer: COMMERCIAL

## 2018-08-10 VITALS
SYSTOLIC BLOOD PRESSURE: 182 MMHG | RESPIRATION RATE: 18 BRPM | DIASTOLIC BLOOD PRESSURE: 80 MMHG | TEMPERATURE: 98 F | HEART RATE: 80 BPM

## 2018-08-10 PROCEDURE — 11042 DBRDMT SUBQ TIS 1ST 20SQCM/<: CPT

## 2018-08-10 PROCEDURE — 74011000250 HC RX REV CODE- 250: Performed by: EMERGENCY MEDICINE

## 2018-08-10 RX ORDER — LIDOCAINE 40 MG/G
CREAM TOPICAL ONCE
Status: COMPLETED | OUTPATIENT
Start: 2018-08-10 | End: 2018-08-10

## 2018-08-10 RX ADMIN — LIDOCAINE: 4 CREAM TOPICAL at 08:35

## 2018-08-10 NOTE — PROGRESS NOTES
I have noted, and reviewed today's data for this patient in [facility] and concur with same. The focused physical exam, other physical findings, Medical history, Review of Symptoms and Medications today remains unchanged except as noted below. Patient notes today: I'm Pearlene Foil, did not get any dressing material from the DME until last night. .  Lesion/Wound, focused exam on Presentation today: BLEs ulcers, redness, 2+ pitting and tenderness in each R>>L. Ulcers with slough and exudate in base. .    Procedure:     Wound # BLEs open wounds, stasis dermatitis. .  Procedure name: sharp debidement. Anaesthesia: Lidocaine; topical    Description: using a sharp curette I removed all adherent debris and exudate into the SubQ level to effect clean bleeding base. Yola Pulido Post debridement dimensions changed as noted:    Depth, add 1.0 mm;    Width, add 0.0 mm   Length, add 0.0 mm. Blood Loss: 1 CCs  Post Procedure Condition/ Diagnosis: as above, good progress and wounds much smaller today. .  Follow up and Future plans today: return 2 weeks, hope to have vascular studies done by then. Yola Pulido Specimens: 0. Patient Counseled regarding/Discussed: as above, avoid trauma, Ok to use treadmill and bike in rehab. Clinical Considerations: watch for worsening of redness, drainage, will add elastic compression long term when wounds heal. .

## 2018-08-10 NOTE — WOUND CARE
08/10/18 0834   Wound Leg Right;Medial   Date First Assessed/Time First Assessed: 08/03/18 0817   POA: Yes  Wound Type: Venous  Location: Leg  Orientation: Right;Medial   DRESSING STATUS Removed   Wound Length (cm) 1.3 cm   Wound Width (cm) 0.4 cm   Wound Depth (cm) 0.1   Wound Surface area (cm^2) 0.52 cm^2   Change in Wound Size % -8.33   Condition of Base Slough   Drainage Amount  Small    Drainage Color Serous   Wound Odor None   Periwound Skin Condition Erythema, non-blanchable   Cleansing and Cleansing Agents  Normal saline   Wound Leg Right; Anterior   Date First Assessed/Time First Assessed: 08/03/18 0818   POA: Yes  Wound Type: Venous  Location: Leg  Orientation: Right; Anterior   Wound Length (cm) 0.6 cm   Wound Width (cm) 0.5 cm   Wound Depth (cm) 0.1   Wound Surface area (cm^2) 0.3 cm^2   Change in Wound Size % 57.14   Condition of Base Slough   Drainage Amount  None   Wound Odor None   Periwound Skin Condition Erythema, non-blanchable   Cleansing and Cleansing Agents  Normal saline   Wound Leg Lower Left; Anterior   Date First Assessed/Time First Assessed: 08/10/18 0836   POA: Yes  Wound Type: Venous  Location: Leg Lower  Orientation: Left; Anterior   DRESSING STATUS Removed   Non-Pressure Injury Partial thickness (epider/derm)   Wound Length (cm) 0.7 cm   Wound Width (cm) 0.9 cm   Wound Depth (cm) 0.1   Wound Surface area (cm^2) 0.63 cm^2   Condition of Base Slough;[place]   Drainage Amount  Scant   Drainage Color Serous   Wound Odor None   Cleansing and Cleansing Agents  Normal saline               Visit Vitals    /80 (BP 1 Location: Right arm, BP Patient Position: At rest)    Pulse 80    Temp 98 °F (36.7 °C)    Resp 18    LMP 02/23/2013

## 2018-08-13 ENCOUNTER — TELEPHONE (OUTPATIENT)
Dept: NEUROLOGY | Age: 63
End: 2018-08-13

## 2018-08-13 NOTE — TELEPHONE ENCOUNTER
Left message for patient that note can not be scanned to [employer], can scan to her e-mail. To call back to confirm e-mail address.

## 2018-08-13 NOTE — TELEPHONE ENCOUNTER
Needs a copy of his return to work letter uploaded to her [facility] [address]. Please call back with any questions.

## 2018-08-15 ENCOUNTER — HOSPITAL ENCOUNTER (OUTPATIENT)
Dept: WOUND CARE | Age: 63
Discharge: HOME | End: 2018-08-15
Payer: COMMERCIAL

## 2018-08-15 VITALS
HEART RATE: 80 BPM | DIASTOLIC BLOOD PRESSURE: 109 MMHG | RESPIRATION RATE: 18 BRPM | SYSTOLIC BLOOD PRESSURE: 173 MMHG | TEMPERATURE: 98.8 F

## 2018-08-15 PROBLEM — L12.0 BULLOUS PEMPHIGOID: Status: ACTIVE | Noted: 2018-08-15

## 2018-08-15 PROCEDURE — 74011000250 HC RX REV CODE- 250: Performed by: EMERGENCY MEDICINE

## 2018-08-15 PROCEDURE — 11042 DBRDMT SUBQ TIS 1ST 20SQCM/<: CPT

## 2018-08-15 RX ORDER — LIDOCAINE 40 MG/G
CREAM TOPICAL ONCE
Status: COMPLETED | OUTPATIENT
Start: 2018-08-15 | End: 2018-08-15

## 2018-08-15 RX ADMIN — LIDOCAINE: 4 CREAM TOPICAL at 14:25

## 2018-08-15 NOTE — WOUND CARE
08/15/18 1422   Wound Leg Lower Left; Anterior   Date First Assessed/Time First Assessed: 08/10/18 0836   POA: Yes  Wound Type: Venous  Location: Leg Lower  Orientation: Left; Anterior   DRESSING STATUS Removed   Non-Pressure Injury Partial thickness (epider/derm)   Wound Length (cm) 0.7 cm   Wound Width (cm) 0.9 cm   Wound Depth (cm) 0.1   Wound Surface area (cm^2) 0.63 cm^2   Change in Wound Size % 0   Condition of Base Slough   Drainage Amount  Scant   Drainage Color Serous   Wound Odor None   Cleansing and Cleansing Agents  Normal saline   Wound Leg Right;Medial   Date First Assessed/Time First Assessed: 08/03/18 0817   POA: Yes  Wound Type: Venous  Location: Leg  Orientation: Right;Medial   DRESSING STATUS Removed   Wound Length (cm) 1.2 cm   Wound Width (cm) 0.2 cm   Wound Depth (cm) 0.1   Wound Surface area (cm^2) 0.24 cm^2   Change in Wound Size % 50   Condition of Base Slough   Drainage Amount  Scant   Drainage Color Serous   Wound Odor None   Cleansing and Cleansing Agents  Normal saline   Wound Leg Lower Anterior;Right   Date First Assessed/Time First Assessed: 08/15/18 1424   POA: Yes  Wound Type: Blister/bullae  Location: Leg Lower  Orientation: Anterior;Right   DRESSING STATUS Removed   Wound Length (cm) 3.5 cm   Wound Width (cm) 1.8 cm   Wound Depth (cm) 0.1   Wound Surface area (cm^2) 6.3 cm^2   Condition of Base Pink   Drainage Amount  Small    Drainage Color Serous   Wound Odor None   Periwound Skin Condition Erythema, non-blanchable   Cleansing and Cleansing Agents  Normal saline               Visit Vitals    BP (!) 173/109 (BP 1 Location: Right arm)    Pulse 80    Temp 98.8 °F (37.1 °C)    Resp 18    LMP 02/23/2013

## 2018-08-15 NOTE — PROGRESS NOTES
I have noted, and reviewed today's data for this patient in [facility] and concur with same. The focused physical exam, other physical findings, Medical history, Review of Symptoms and Medications today remains unchanged except as noted below. Patient notes today: doing Opal  but have new blisters over anterior R lower leg, is it flesh eating bacteria. Has stopped one immune modulator for her arthritis and is scheduled to start another when all these wounds heal.  Lesion/Wound, focused exam on Presentation today: LLE low anterior pretib ulcer smaller but with exudate as previously. RLE anterior proximal pretib with increased erythema, two large vesilces intraderma. Previous linear open wound on lower anterior has slough and debris but is much smaller. .    Procedure:   Wound # BLEs . Procedure name: sharp debridement into subQ R/L. Anaesthesia: Lidocaine; topical    Description: using a sharp curette I removed slough and debris from both previously noted ulcer bases to effect a clean bleeding base, I also unroofed vesicles in the proximal RLE to intradermal layer. Zeenat Tuttle Post debridement dimensions changed as noted:    Depth, add 1.0 mm;    Width, add 0.0 mm   Length, add 0.0 mm. Level/depth of debridement:   Blood Loss: 3 CCs  Post Procedure Condition/ Diagnosis: stasis ulceration, open wound, probably new Bullous Pemphigoid. .  Follow up and Future plans today: continue daily cleanse, aquacel cover to open areas, elastic compression return 1 week. Zeenat Tuttle Specimens: C&S over new vesicle areas. Patient Counseled regarding/Discussed: as above, will do more aggressive local care this week. .  Clinical Considerations: concern for Pempigoid flare due to immunomodulator start/stop cycle. Zeenat Tuttle

## 2018-08-23 ENCOUNTER — TELEPHONE (OUTPATIENT)
Dept: WOUND CARE | Age: 63
End: 2018-08-23

## 2018-08-24 ENCOUNTER — HOSPITAL ENCOUNTER (OUTPATIENT)
Dept: WOUND CARE | Age: 63
Discharge: HOME | End: 2018-08-24
Payer: COMMERCIAL

## 2018-08-24 VITALS
SYSTOLIC BLOOD PRESSURE: 140 MMHG | OXYGEN SATURATION: 95 % | HEART RATE: 83 BPM | TEMPERATURE: 98.7 F | DIASTOLIC BLOOD PRESSURE: 82 MMHG | RESPIRATION RATE: 19 BRPM

## 2018-08-24 PROCEDURE — 74011000250 HC RX REV CODE- 250: Performed by: EMERGENCY MEDICINE

## 2018-08-24 PROCEDURE — 11042 DBRDMT SUBQ TIS 1ST 20SQCM/<: CPT

## 2018-08-24 RX ORDER — LIDOCAINE HYDROCHLORIDE 20 MG/ML
JELLY TOPICAL AS NEEDED
Status: DISCONTINUED | OUTPATIENT
Start: 2018-08-24 | End: 2018-08-28 | Stop reason: HOSPADM

## 2018-08-24 RX ADMIN — LIDOCAINE HYDROCHLORIDE: 20 JELLY TOPICAL at 08:53

## 2018-08-24 NOTE — PROGRESS NOTES
I have noted, and reviewed today's data for this patient in [facility] and concur with same. The focused physical exam, other physical findings, Medical history, Review of Symptoms and Medications today remains unchanged except as noted below. Patient notes today: I'm Juani Sanders is ready to start new immune modulator. Lesion/Wound, focused exam on Presentation today: LLE wound crusted and closed, RLE old wound is closed, new denuded vesicle area  In the anterior pretib is covered with slough into subQ level. .    Procedure:   Wound # BLEs satsis dermatitis, ulceration, pemphigoid? .  Procedure name: sharp debridement. Anaesthesia: Lidocaine; topical    Description: using a sharp curette I removed debris and slough mostly from the R anterior pretib ulcer to effect a clean bleeding base. Langley Junk Post debridement dimensions changed as noted:    Depth, add 1.0 mm;    Width, add 0.0 mm   Length, add 0.0 mm. Level/depth of debridement:   Blood Loss: 1 CCs  Post Procedure Condition/ Diagnosis: improved all wounds but R anterior pretib which is much smaller. Follow up and Future plans today: return 1 week, will add promogran, . Specimens: 0. Patient Counseled regarding/Discussed: good progress but will now need to heal ulcer in the presence of new anti immune meds. .  Clinical Considerations: alert to immune issues, add Vit A 8-10,000 units tid, consider   CBD for pain. Langley Junk

## 2018-09-07 ENCOUNTER — HOSPITAL ENCOUNTER (OUTPATIENT)
Dept: WOUND CARE | Age: 63
Discharge: HOME | End: 2018-09-07
Payer: COMMERCIAL

## 2018-09-07 VITALS
DIASTOLIC BLOOD PRESSURE: 84 MMHG | RESPIRATION RATE: 20 BRPM | TEMPERATURE: 99 F | SYSTOLIC BLOOD PRESSURE: 179 MMHG | HEART RATE: 80 BPM

## 2018-09-07 PROCEDURE — 11042 DBRDMT SUBQ TIS 1ST 20SQCM/<: CPT

## 2018-09-07 PROCEDURE — 74011000250 HC RX REV CODE- 250: Performed by: EMERGENCY MEDICINE

## 2018-09-07 RX ORDER — LIDOCAINE HYDROCHLORIDE 20 MG/ML
JELLY TOPICAL AS NEEDED
Status: DISCONTINUED | OUTPATIENT
Start: 2018-09-07 | End: 2018-09-11 | Stop reason: HOSPADM

## 2018-09-07 RX ADMIN — LIDOCAINE HYDROCHLORIDE: 20 JELLY TOPICAL at 11:07

## 2018-09-07 NOTE — WOUND CARE
09/07/18 1101 Wound Leg Left; Anterior Date First Assessed/Time First Assessed: 09/07/18 1100   POA: Yes  Wound Type: Blister/bullae  Location: Leg  Orientation: Left; Anterior DRESSING STATUS Old drainage DRESSING TYPE Silver products;Gauze Non-Pressure Injury Partial thickness (epider/derm) Wound Length (cm) 3 cm Wound Width (cm) 5.4 cm Wound Depth (cm) 0.1 Wound Surface area (cm^2) 16.2 cm^2 Condition of Base Pink Condition of Edges Open Tissue Type Pink Drainage Amount  Small Drainage Color Serosanguinous Wound Odor None Periwound Skin Condition Erythema, blanchable;Edematous Cleansing and Cleansing Agents  Normal saline Wound Leg Lower Anterior;Right Date First Assessed/Time First Assessed: 08/15/18 1424   POA: Yes  Wound Type: Blister/bullae  Location: Leg Lower  Orientation: Anterior;Right DRESSING STATUS Clean, dry, and intact Wound Length (cm) 0.1 cm Wound Width (cm) 0.1 cm Wound Depth (cm) 0.1 Wound Surface area (cm^2) 0.01 cm^2 Condition of Base Epithelializing Drainage Amount  None Wound Odor None

## 2018-09-07 NOTE — PROGRESS NOTES
I have noted, and reviewed today's data for this patient in [facility] and concur with same. The focused physical exam, other physical findings, Medical history, Review of Symptoms and Medications today remains unchanged except as noted below. Patient notes today: RLE is fine, LLE is a new blister today. . 
Lesion/Wound, focused exam on Presentation today: BLEs, some reddened skin over lower haile lateral surfaces, RLE ulcer healed, 4mm vessicle supero-medial to healed lesion, LLE new large vessicle over lower anterior pretib. Procedure: Wound # BLEs, stasis dermatitis, bullous pemphigoid eruption . Procedure name: sharp debridement. Anaesthesia: Lidocaine; topical   
Description: using a sharp curette I removed all adherent slough and debris to effect a clean bleeding base, . Post debridement dimensions changed as noted:  
 Depth, add 1.0 mm;  
 Width, add 0.0 mm Length, add 0.0 mm. Level/depth of debridement:  
Blood Loss: 2 CCs Post Procedure Condition/ Diagnosis: healed R, pemphigoid eruption L, . Follow up and Future plans today: return 1-2 weeks, medrol dose pack, minocycline. Specimens: 0. Patient Counseled regarding/Discussed: as above, immune pathology in skin as well. Will defervesce inflammatory reaction. Clinical Considerations: alert to infection, inflamm control. Flavio Cavazos

## 2018-09-14 ENCOUNTER — HOSPITAL ENCOUNTER (OUTPATIENT)
Dept: WOUND CARE | Age: 63
Discharge: HOME | End: 2018-09-14
Payer: COMMERCIAL

## 2018-09-14 VITALS
BODY MASS INDEX: 47.09 KG/M2 | RESPIRATION RATE: 20 BRPM | DIASTOLIC BLOOD PRESSURE: 110 MMHG | HEIGHT: 66 IN | HEART RATE: 69 BPM | WEIGHT: 293 LBS | TEMPERATURE: 98.6 F | SYSTOLIC BLOOD PRESSURE: 180 MMHG

## 2018-09-14 PROCEDURE — 11042 DBRDMT SUBQ TIS 1ST 20SQCM/<: CPT

## 2018-09-14 PROCEDURE — 74011000250 HC RX REV CODE- 250: Performed by: EMERGENCY MEDICINE

## 2018-09-14 RX ORDER — MINOCYCLINE HYDROCHLORIDE 100 MG/1
100 TABLET ORAL 2 TIMES DAILY
COMMUNITY

## 2018-09-14 RX ORDER — LIDOCAINE HYDROCHLORIDE 20 MG/ML
JELLY TOPICAL AS NEEDED
Status: DISCONTINUED | OUTPATIENT
Start: 2018-09-14 | End: 2018-09-14

## 2018-09-14 RX ORDER — LIDOCAINE 40 MG/G
CREAM TOPICAL
Status: COMPLETED | OUTPATIENT
Start: 2018-09-14 | End: 2018-09-14

## 2018-09-14 RX ADMIN — LIDOCAINE: 4 CREAM TOPICAL at 10:38

## 2018-09-14 NOTE — PROGRESS NOTES
I have noted, and reviewed today's data for this patient in [facility] and concur with same. The focused physical exam, other physical findings, Medical history, Review of Symptoms and Medications today remains unchanged except as noted below. Patient notes today: hurts a lot. Mostly in the evening and it hurts into the ankle. Georgie Ramírez Lesion/Wound, focused exam on Presentation today: LLE ulcer/vesicle 70% reepithelialized with slough and exudate in upper 25 % area. . 
 
Procedure: Wound # LLE pemphigoid lesion. Procedure name: sharp debridement. Anaesthesia: Lidocaine; topical   
Description: using a sharp curette I removed all adherent debris and slough to effect a clean bleeding base. Georgie Ramírez Post debridement dimensions changed as noted:  
 Depth, add 1.0 mm;  
 Width, add 0.0 mm Length, add 0.0 mm. Level/depth of debridement:  
Blood Loss: 1 CCs Post Procedure Condition/ Diagnosis: good progress, . Follow up and Future plans today: continue dressings with gentamycin to surface change every other. Return 2 weeks. Georgie Ramírez Specimens: 0. Patient Counseled regarding/Discussed: as above, . Clinical Considerations: alert to Pemphigoid suppression, vein/edema control, will get elastic stockings fitted when able. Done with medrol post 2 days, will finish P.O. Minocycline tomorrow. Georgie Ramírez

## 2018-09-14 NOTE — WOUND CARE
09/14/18 1033 Wound Leg Left; Anterior Date First Assessed/Time First Assessed: 09/07/18 1100   POA: Yes  Wound Type: Blister/bullae  Location: Leg  Orientation: Left; Anterior DRESSING STATUS Old drainage DRESSING TYPE Silver products; Adhesive wound dressing (Band-Aid) Non-Pressure Injury Partial thickness (epider/derm) Wound Length (cm) 2.9 cm Wound Width (cm) 4.3 cm Wound Depth (cm) 0.1 Wound Surface area (cm^2) 12.47 cm^2 Change in Wound Size % 23.02 Condition of Base Pink; White;Granulation Condition of Edges Open Tissue Type Pink Drainage Amount  Small Drainage Color Serous Wound Odor None Periwound Skin Condition Erythema, blanchable Cleansing and Cleansing Agents  Soap and water Visit Vitals  BP (!) 180/110 (BP 1 Location: Left arm, BP Patient Position: At rest)  Pulse 69  Temp 98.6 °F (37 °C)  Resp 20  
 Ht 5' 6\" (1.676 m)  Wt 136.1 kg (300 lb)  LMP 02/23/2013  BMI 48.42 kg/m2

## 2018-10-04 ENCOUNTER — TELEPHONE (OUTPATIENT)
Dept: WOUND CARE | Age: 63
End: 2018-10-04

## 2018-10-04 NOTE — TELEPHONE ENCOUNTER
LVM 2x advised patient physician out of office appointment 10/5/2018 will be cancelled patient can call office to reschedule.

## 2018-10-05 ENCOUNTER — APPOINTMENT (OUTPATIENT)
Dept: WOUND CARE | Age: 63
End: 2018-10-05

## 2019-05-09 RX ORDER — FUROSEMIDE 40 MG/1
TABLET ORAL
Qty: 90 TAB | Refills: 3 | Status: SHIPPED | OUTPATIENT
Start: 2019-05-09

## 2021-06-01 ENCOUNTER — TRANSCRIBE ORDER (OUTPATIENT)
Dept: SCHEDULING | Age: 66
End: 2021-06-01

## 2021-06-01 DIAGNOSIS — Z45.2 FITTING AND ADJUSTMENT OF VASCULAR CATHETER: Primary | ICD-10-CM

## 2022-03-19 PROBLEM — J96.22 ACUTE ON CHRONIC RESPIRATORY FAILURE WITH HYPOXIA AND HYPERCAPNIA (HCC): Status: ACTIVE | Noted: 2017-08-05

## 2022-03-19 PROBLEM — I25.10 CORONARY ARTERY DISEASE INVOLVING NATIVE HEART: Status: ACTIVE | Noted: 2017-08-05

## 2022-03-19 PROBLEM — L12.0 BULLOUS PEMPHIGOID: Status: ACTIVE | Noted: 2018-08-15

## 2022-03-19 PROBLEM — E78.5 DYSLIPIDEMIA, GOAL LDL BELOW 70: Status: ACTIVE | Noted: 2017-08-05

## 2022-03-19 PROBLEM — J96.21 ACUTE ON CHRONIC RESPIRATORY FAILURE WITH HYPOXIA AND HYPERCAPNIA (HCC): Status: ACTIVE | Noted: 2017-08-05

## 2022-03-19 PROBLEM — L03.115 CELLULITIS OF RIGHT LOWER EXTREMITY: Status: ACTIVE | Noted: 2018-08-03

## 2022-03-19 PROBLEM — I27.20 PULMONARY HYPERTENSION (HCC): Status: ACTIVE | Noted: 2017-08-05

## 2022-03-19 PROBLEM — I87.333 CHRONIC VENOUS HYPERTENSION (IDIOPATHIC) WITH ULCER AND INFLAMMATION OF BILATERAL LOWER EXTREMITY (HCC): Status: ACTIVE | Noted: 2018-08-03

## 2022-03-19 PROBLEM — I89.0 LYMPH EDEMA: Status: ACTIVE | Noted: 2018-08-03

## 2022-03-20 PROBLEM — J44.9 COPD (CHRONIC OBSTRUCTIVE PULMONARY DISEASE) (HCC): Status: ACTIVE | Noted: 2018-08-03

## 2022-03-20 PROBLEM — M05.20 RHEUMATOID ARTERITIS (HCC): Status: ACTIVE | Noted: 2018-08-03

## 2022-03-20 PROBLEM — T14.8XXA ANIMAL SCRATCH: Status: ACTIVE | Noted: 2018-08-03

## 2023-05-25 RX ORDER — METOPROLOL SUCCINATE 25 MG/1
25 TABLET, EXTENDED RELEASE ORAL DAILY
COMMUNITY
Start: 2018-05-03

## 2023-05-25 RX ORDER — ASPIRIN 81 MG/1
TABLET ORAL DAILY
COMMUNITY

## 2023-05-25 RX ORDER — ACETAMINOPHEN 325 MG/1
650 TABLET ORAL EVERY 4 HOURS PRN
COMMUNITY

## 2023-05-25 RX ORDER — PREDNISONE 5 MG/1
TABLET ORAL
COMMUNITY
Start: 2017-10-27

## 2023-05-25 RX ORDER — MINOCYCLINE HYDROCHLORIDE 100 MG/1
100 TABLET ORAL 2 TIMES DAILY
COMMUNITY

## 2023-05-25 RX ORDER — POTASSIUM CHLORIDE 750 MG/1
1 CAPSULE, EXTENDED RELEASE ORAL DAILY
COMMUNITY
Start: 2018-10-29

## 2023-05-25 RX ORDER — ERGOCALCIFEROL 1.25 MG/1
50000 CAPSULE ORAL
COMMUNITY

## 2023-05-25 RX ORDER — FUROSEMIDE 40 MG/1
1 TABLET ORAL DAILY
COMMUNITY
Start: 2018-06-08

## 2023-05-25 RX ORDER — FLUTICASONE PROPIONATE AND SALMETEROL 250; 50 UG/1; UG/1
1 POWDER RESPIRATORY (INHALATION) EVERY 12 HOURS
COMMUNITY

## 2023-07-17 NOTE — PROGRESS NOTES
[facility] [place]  221/1  HOSPITAL MEDICINE PROGRESS NOTE   Patient: Bill Maldonado  Today's Date: 7/17/2023    YOB: 1967  Admission Date: 7/15/2023    MRN: [id number]  Inpatient LOS: 0    Attending: Janet Dumont MD  Hospital Day: Hospital Day: 3    Subjective   HISTORY AND SUBJECTIVE COMPLAINTS     Chief Complaint:   Left leg pain and swelling     Interval History / Subjective:   Patient endorses severe pain at left calf with activity, he has difficulty even  walked 10 ft to bathroom.  The left lower leg is very tight, and swollen.   Denies any recent injury, surgery, or trauma.  Left foot is slightly cool than the right, still able to palpate pedal.     He has an episodes of diaphoresis with active, denies dizziness, lightheaded, no heart palpitation, shortness breath or chest pain.  Vital signs stable.     Hospital Course:  Bill Maldonado is a 55 year old male who presented on 7/15/2023 with complaints of Leg Pain  .  CT PE negative for PE  ROS:  Pertinent systems negative except as above.    Objective   PHYSICAL EXAMINATION     Vital 24 Hour Range Most Recent Value   Temperature Temp  Min: 98.4 °F (36.9 °C)  Max: 99.5 °F (37.5 °C) 98.4 °F (36.9 °C)   Pulse Pulse  Min: 74  Max: 81 75   Respiratory Resp  Min: 16  Max: 20 20   Blood Pressure BP  Min: 131/79  Max: 153/71 131/79   Pulse Oximetry SpO2  Min: 96 %  Max: 98 % 98 %   Arterial BP No data recorded     O2 No data recorded       Recorded Intake and Output:    Intake/Output Summary (Last 24 hours) at 7/17/2023 1319  Last data filed at 7/17/2023 1302  Gross per 24 hour   Intake 3216.46 ml   Output 3125 ml   Net 91.46 ml      Recorded Last Stool Occurrence:       Vital Most Recent Value First Value   Weight 104 kg (229 lb 4.5 oz) Weight: (!) 141.1 kg (311 lb 1.1 oz)   Height 6' 2\" (188 cm) Height: 6' 2\" (188 cm)   BMI 29.44 N/A     General: Looks well well developed, well nourished and no apparent distress  CV: regular rate and  Palliative Medicine Consult    Patient Name: Leo Correia  YOB: 1955    Date of Initial Consult: 3/30/18  Reason for Consult: Care decisions  Requesting Provider: Dr. Melvin Zuñiga  Primary Care Physician: Andre Verdin MD      SUMMARY:   Leo Correia is a 58 y.o. with a past history of morbid obesity, hypercapnia respiratory failure, OHS, restrictive lung disease, RA, HTN, who was admitted on 3/29/2018 from home with a diagnosis of a/c hypoxic/hypercapnic respiratory failure. Current medical issues leading to Palliative Medicine involvement include: Care decisions    Chart reviewed/HPI-patient admitted with confusion/respiratory failure and found to have ABG of 7.18/140/79 and placed on BIPAP and  ABG this morning with 7.36/96/70. Patient is more awake. Patient had trilogy at home but removed because she was not using it. Her  states has 4 liters oxygen chronically. SH- and this is second marriage for her. They have been  for 2 years. Her first  passed of cancer. She has no children. PALLIATIVE DIAGNOSES:   1. GOC discussion  2. DNR discussion  3. SOB  4. Chronic respiratory failure  5. Noncompliance  6. Restrictive lung disease  7. Debility       PLAN:   1. Fadumo Rosenbaum and I met with patient who is being discharged today with a Trilogy being arranged. Reviewed her medical issues and she seems to have a good understanding of her medical problems and understands the absolute need to wear the Trilogy at night and when napping. She seems amenable to being compliant at this time. 2. GOC-full restorative measures and home Trilogy. Is beginning the disability process this week because realizes her lung issues are really precluding her from workin  3. AMD-she states she has completed and Zhen Servin is primary MPOA. Fadumo Rosenbaum did explain the importance of giving a copy to providers and we can also scan on in the EMR  4. Code status-reviewed code status again today.  She has some remembrance of our discussion last week. Today, she does state she would want attempts at resuscitation and intubation(in pre-arrest state) but would not want kept alive on \"machines\". We have changed her code status in the EMR. No DDNR completed based on this discussion  5. Symptom management-no acute symptoms for us to manage  6. Psychosocial-appears her  is main support system. She does not have children. Joanne is part of her life and chaplains also have visited during this hospitalization. She is looking forward to seeing her dog when she returns home  7. Initial consult note routed to primary continuity provider  8. Communicated plan of care with: Palliative IDT       GOALS OF CARE / TREATMENT PREFERENCES:   [====Goals of Care====]  GOALS OF CARE:  Patient/Health Care Proxy Stated Goals: Other (comment) (full restorative measures)      TREATMENT PREFERENCES:   Code Status: Full Code    Advance Care Planning:  Advance Care Planning 4/3/2018   Patient's Healthcare Decision Maker is: Verbal statement (Legal Next of Kin remains as decision maker)   Primary Decision Maker Name Salvatore Piersonl   Primary Decision Maker Phone Number [phone]   Primary Decision Maker Relationship to Patient Spouse   Confirm Advance Directive Yes, not on file   Does the patient have other document types -       Medical Interventions: Limited additional interventions  Other Instructions:           The palliative care team has discussed with patient / health care proxy about goals of care / treatment preferences for patient.  [====Goals of Care====]         HISTORY:     History obtained from: chart, patient, spouse    CHIEF COMPLAINT: altered mental status    HPI/SUBJECTIVE:    The patient is:   [x] Verbal and participatory  [] Non-participatory due to:   Patient is alert and oriented and excited about returning home    Clinical Pain Assessment (nonverbal scale for severity on nonverbal patients):   Clinical Pain rhythm and no murmurs, rubs, or thrills  Resp: clear to auscultation bilaterally  Abd: soft, nontender, nondistended and no hepatosplenomegaly  Ext:  Significant left lower leg swollen.  Very tender specially at posterior of the lower leg.  Left foot slightly cooler than the right, palpable pedal pulses.   Skin: no rashes, lesions, or ulcers noted and no induration, subcutaneous nodules, or tightening noted  Neuro: CN 2-12 grossly intact, normal sensation  and no focal deficits noted  Psych: normal judgement and insight, oriented to time, place and person and normal mood and affect    TEST RESULTS     Labs: The Laboratory values listed below have been reviewed and pertinent findings discussed in the Assessment and Plan.    Laboratory values:     Recent Labs   Lab 07/17/23  0503 07/15/23  2316   SODIUM 136 136   POTASSIUM 3.9 3.7   CHLORIDE 101 101   CO2 28 28   CALCIUM 8.7 8.8   GLUCOSE 92 92   BUN 12 13   CREATININE 0.90 0.94   MG 2.0 2.0          Radiology: Imaging studies have been reviewed and pertinent findings discussed in the Assessment and Plan.     ANCILLARY ORDERS     Diet:  Regular Diet  Telemetry: On  Consults:    PHARMACY TO DOSE AND MONITOR WARFARIN  IP CONSULT TO HEMATOLOGY  Therapy Orders:   PT and OT Orders Placed this Encounter   Procedures   • Occupational Therapy   • Physical Therapy       ADVANCED DIRECTIVES     Code Status: Full Resuscitation             ASSESSMENT AND PLAN     Acute deep vein thrombosis of left popliteal and calf veins  -Presented with left lower extremity swelling and pain for two days     -Left lower extremity duplex ultrasound showed deep vein thrombosis involving the left popliteal and calf veins  -Patient does report long car ride, about 4.5 hours, one week ago. Denies injury to extremity   -Denies personal or family history of clotting disorders  -CT PE study negative for PE.    -Patient is currently anticoagulated with Warfarin for atrial fibrillation; INR therapeutic  Assessment  Severity: 0            FUNCTIONAL ASSESSMENT:     Palliative Performance Scale (PPS):  PPS: 50       PSYCHOSOCIAL/SPIRITUAL SCREENING:     Advance Care Planning:  Advance Care Planning 4/3/2018   Patient's Healthcare Decision Maker is: Verbal statement (Legal Next of Kin remains as decision maker)   Primary Decision Maker Name Yadira Huston   Primary Decision Maker Phone Number [phone]   Primary Decision Maker Relationship to Patient Spouse   Confirm Advance Directive Yes, not on file   Does the patient have other document types -        Any spiritual / Islam concerns:  [] Yes /  [x] No    Caregiver Burnout:  [] Yes /  [x] No /  [] No Caregiver Present      Anticipatory grief assessment:   [x] Normal  / [] Maladaptive       ESAS Anxiety: Anxiety: 1    ESAS Depression: Depression: 0        REVIEW OF SYSTEMS:     Positive and pertinent negative findings in ROS are noted above in HPI. The following systems were [x] reviewed / [] unable to be reviewed as noted in HPI  Other findings are noted below. Systems: constitutional, ears/nose/mouth/throat, respiratory, gastrointestinal, genitourinary, musculoskeletal, integumentary, neurologic, psychiatric, endocrine. Positive findings noted below. Modified ESAS Completed by: provider   Fatigue: 2 Drowsiness: 4   Depression: 0 Pain: 0   Anxiety: 1 Nausea: 0   Anorexia: 0 Dyspnea: 5     Constipation: No     Stool Occurrence(s): 1        PHYSICAL EXAM:     From RN flowsheet:  Wt Readings from Last 3 Encounters:   04/02/18 278 lb 14.1 oz (126.5 kg)   12/08/17 283 lb (128.4 kg)   08/04/17 271 lb 6.4 oz (123.1 kg)     Blood pressure 133/69, pulse 63, temperature 99.3 °F (37.4 °C), resp. rate 16, height 5' 5\" (1.651 m), weight 278 lb 14.1 oz (126.5 kg), last menstrual period 02/23/2013, SpO2 92 %, not currently breastfeeding.     Pain Scale 1: Numeric (0 - 10)  Pain Intensity 1: 5     Pain Location 1: Generalized     Pain Description 1: Aching  Pain upon admission  -Given occurrence of DVT while therapeutic on Warfarin, consulted hematology Cam. He recommend to DC coumadin, place on low weight hep drip. Consult IR for possible thrombectomy  -Discuss case with IR lance, not recommend thrombectomy or filter at this time.   -Symptoms management: prn pain medication:  P.r.n. hydrocodone, morphine, Tylenol. Elevated legs  -check CPK with normal limit  -Dr. Levy suggests discharge on Lovenox 1.5 mg/kg for one month; then follow-up with him to discuss option of switching to direct oral anticoagulant.  -Checked prothrombin gene mutation activated protein C resistance with reflex, protein C, protein S antithrombin 3, antiphospholipid antibody panel for thrombophilia workup  -Pt will need at least a week of work excuse upon discharge     Leukocytosis  -WBC mildly elevated at 12.4  -No evidence on infection, likely reactive   -Repeat WBC normalized     Persistent atrial fibrillation  -Continue Tikosyn and metoprolol   -Tele monitor  -EKG prn, and keep K>4.0 and mag> 2.0  -Checked QT while on Tikosyn      Essential hypertension   -Blood pressure stable  -Continue lisinopril and metoprolol.      Obstructive sleep apnea   -CPAP when sleeping per patient's home settings    Prolonged QT  - ,   - I discussed the case with the EP nurse practitioner Pablo; he suggests to continue current dose Tikosyn. Repeat EKG daily, if continue prolonged QT, consult EP, to adjust dose.     Smoking status: Not a Tobacco User    Nutrition status: appropriate  Body mass index is 29.44 kg/m². - Patient is overweight with BMI 24-30  DVT Prophylaxis: Heparin drip per protocol         DISCHARGE PLANNING     The patient's treatment plans were discussed with patient.    Discharge Planning    Barriers to discharge: Patient is not medically ready and needs to remain in the hospital today due to heparin drip   Anticipated discharge destination: Home  Expected Discharge Date: 7/18/2023                 PRATEEK Garcia  Hospitalist  7/17/2023  1:19 PM     Physician Note     I saw and examined the patient. The patients symptoms, physical findings, and studies, are as documented above by the Nurse Practitioner;  I discussed the patients treatment plans with the patient, RN and .  Data Reviewed by me included I personally reviewed all pertinent labs and images done over the past 24 hours    Portions of the History, Physical Examination and [facility] Medical Decision Making were performed by the Nurse Practitioner and portions of the History, Physical Examination and [facility] Medical Decision Making and complete History, Physical Examination and [facility] Medical Decision Making were done by me.  My Findings include   Patient admitted for acute lower extremity swelling found to have LLE DVT. IR consulted for possible thrombectomy but noted no current indication given lack of extension into femoral or iliac veins. Also documented no indication for IVC filter at the present time unless there would be planned interruption of therapeutic anticoagulation. Hematology consulted, rec'd lovenox 1.5mg/kg daily on discharge. Due to continued swelling and painful ambulation patient kept today, will discharge in AM on lovenox.    My assessment and plan are as documented above by the Nurse Practitioner, and I have reviewed and edited the above note as needed.    Physician time: 20 min    Janet Dumont MD  7/17/2023  2:04 PM             Intervention(s) 1: Medication (see MAR)  Last bowel movement, if known:     Constitutional: awake and alert, sitting in chair  Eyes: pupils equal, anicteric  ENMT: no nasal discharge, moist mucous membranes  Cardiovascular: regular rhythm, distal pulses intact  Respiratory: breathing not labored, symmetric  Gastrointestinal: soft non-tender, +bowel sounds  Musculoskeletal: no deformity, no tenderness to palpation  Skin: warm, dry  Neurologic: following commands, moving all extremities  Psychiatric: full affect, no hallucinations  Other:       HISTORY:     Principal Problem:    Acute on chronic respiratory failure with hypoxia and hypercapnia (HCC) (8/5/2017)    Active Problems:    Rheumatoid arthritis involving multiple sites with positive rheumatoid factor ([facility] [address] (10/7/2016)      Essential hypertension (10/7/2016)      Acute encephalopathy (3/4/2017)      Class 3 obesity with serious comorbidity and body mass index (BMI) of 45.0 to 49.9 in adult [facility]) (8/5/2017)      Pulmonary hypertension (8/5/2017)      Dyslipidemia, goal LDL below 70 (8/5/2017)      Coronary artery disease involving native heart (8/5/2017)      Past Medical History:   Diagnosis Date    Hypertension     RA (rheumatoid arthritis) ([facility] [address]       Past Surgical History:   Procedure Laterality Date    HX GYN      R vulvectomy      Family History   Problem Relation Age of Onset    Heart Disease Father     Hypertension Father     Stroke Father     Heart Disease Mother     Hypertension Mother     Hypertension Sister     Hypertension Brother       History reviewed, no pertinent family history.   Social History   Substance Use Topics    Smoking status: Former Smoker    Smokeless tobacco: Never Used    Alcohol use Yes      Comment: occasional     Allergies   Allergen Reactions    Bee Sting [Sting, Bee] Anaphylaxis    Codeine Hives and Shortness of Breath     bp drop     Methotrexate Nausea and Vomiting    Penicillins Hives and Shortness of Breath     bp drop      Current Facility-Administered Medications   Medication Dose Route Frequency    abatacept (ORENCIA) injection syringe 125 mg (Patient Supplied)  125 mg SubCUTAneous every Tuesday    atorvastatin (LIPITOR) tablet 20 mg  20 mg Oral QHS    predniSONE (DELTASONE) tablet 5 mg  5 mg Oral DAILY WITH BREAKFAST    hydrALAZINE (APRESOLINE) 20 mg/mL injection 20 mg  20 mg IntraVENous Q6H PRN    acetaminophen (TYLENOL) tablet 650 mg  650 mg Oral Q4H PRN    furosemide (LASIX) tablet 40 mg  40 mg Oral DAILY    metoprolol succinate (TOPROL-XL) XL tablet 25 mg  25 mg Oral DAILY    sodium chloride (NS) flush 5-10 mL  5-10 mL IntraVENous Q8H    sodium chloride (NS) flush 5-10 mL  5-10 mL IntraVENous PRN    diphenhydrAMINE (BENADRYL) capsule 25 mg  25 mg Oral Q4H PRN    ondansetron (ZOFRAN) injection 4 mg  4 mg IntraVENous Q4H PRN    enoxaparin (LOVENOX) injection 40 mg  40 mg SubCUTAneous Q12H    arformoterol (BROVANA) neb solution 15 mcg  15 mcg Nebulization BID RT    budesonide (PULMICORT) 500 mcg/2 ml nebulizer suspension  500 mcg Nebulization BID RT     Current Outpatient Prescriptions   Medication Sig    atorvastatin (LIPITOR) 20 mg tablet Take 1 Tab by mouth nightly. For high cholesterol    fluticasone-salmeterol (ADVAIR DISKUS) 250-50 mcg/dose diskus inhaler Take 1 Puff by inhalation every twelve (12) hours.  furosemide (LASIX) 40 mg tablet Take 40 mg by mouth daily.  multivitamin (ONE A DAY) tablet Take 1 Tab by mouth daily.  predniSONE (DELTASONE) 5 mg tablet TK 1 TO 2 TS PO QD PRN    acetaminophen (TYLENOL) 325 mg tablet Take 650 mg by mouth every four (4) hours as needed for Pain.  metoprolol succinate (TOPROL-XL) 25 mg XL tablet Take 1 Tab by mouth daily.  ergocalciferol (VITAMIN D2) 50,000 unit capsule Take 50,000 Units by mouth Every Friday.  On Friday   Indications: Vitamin D Deficiency    vitamins  A,C,E-zinc-copper (OCUVITE PRESERVISION) [phone] unit-mg-unit tab tablet Take 1 Tab by mouth two (2) times a day.  ORENCIA 125 mg/mL syrg 125 mg by SubCUTAneous route every Sunday. On Saturday    leucovorin calcium (WELLCOVORIN) 5 mg tablet Take 5 mg by mouth every seven (7) days. To be taken 24 hours after methotrexate. LAB AND IMAGING FINDINGS:     Lab Results   Component Value Date/Time    WBC 6.4 04/03/2018 01:09 AM    HGB 10.6 (L) 04/03/2018 01:09 AM    PLATELET 920 12/69/3673 01:09 AM     Lab Results   Component Value Date/Time    Sodium 143 04/03/2018 01:09 AM    Potassium 3.8 04/03/2018 01:09 AM    Chloride 101 04/03/2018 01:09 AM    CO2 39 (H) 04/03/2018 01:09 AM    BUN 18 04/03/2018 01:09 AM    Creatinine 0.55 04/03/2018 01:09 AM    Calcium 8.3 (L) 04/03/2018 01:09 AM    Magnesium 2.1 04/03/2018 01:09 AM    Phosphorus 5.0 (H) 04/03/2018 01:09 AM      Lab Results   Component Value Date/Time    AST (SGOT) 25 04/03/2018 01:09 AM    Alk. phosphatase 79 04/03/2018 01:09 AM    Protein, total 6.1 (L) 04/03/2018 01:09 AM    Albumin 2.3 (L) 04/03/2018 01:09 AM    Globulin 3.8 04/03/2018 01:09 AM     No results found for: INR, PTMR, PTP, PT1, PT2, APTT   No results found for: IRON, FE, TIBC, IBCT, PSAT, FERR   Lab Results   Component Value Date/Time    pH 7.37 03/30/2018 08:08 AM    PCO2 96 (H) 03/30/2018 08:08 AM    PO2 71 (L) 03/30/2018 08:08 AM     No components found for: GLPOC   No results found for: CPK, CKMB             Total time: 35  Counseling / coordination time, spent as noted above: 30  > 50% counseling / coordination?: yes    Prolonged service was provided for  []30 min   []75 min in face to face time in the presence of the patient, spent as noted above. Time Start:   Time End:   Note: this can only be billed with 74669 (initial) or 17901 (follow up). If multiple start / stop times, list each separately.
